# Patient Record
Sex: FEMALE | Race: BLACK OR AFRICAN AMERICAN | Employment: FULL TIME | URBAN - METROPOLITAN AREA
[De-identification: names, ages, dates, MRNs, and addresses within clinical notes are randomized per-mention and may not be internally consistent; named-entity substitution may affect disease eponyms.]

---
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
## 2019-05-07 ENCOUNTER — OFFICE VISIT (OUTPATIENT)
Dept: ENDOCRINOLOGY | Age: 19
End: 2019-05-07

## 2019-05-07 VITALS
HEIGHT: 64 IN | SYSTOLIC BLOOD PRESSURE: 119 MMHG | HEART RATE: 93 BPM | BODY MASS INDEX: 40.7 KG/M2 | WEIGHT: 238.4 LBS | DIASTOLIC BLOOD PRESSURE: 68 MMHG

## 2019-05-07 DIAGNOSIS — E04.2 MULTINODULAR GOITER (NONTOXIC): Primary | ICD-10-CM

## 2019-05-07 NOTE — PROGRESS NOTES
Chief Complaint   Patient presents with    Thyroid Problem     pcp is Rylan Casiano in [place], [place] fax: [phone] phone: [phone]   Records reviewed  History of Present Illness: Chirag Velasco is a 20 y.o. female who I was asked to see in consult by Dr. Rylan Casiano, for evaluation of Multinodular goiter. Pt had a thyroid US on 2/19/19 which showed multiple sub-CM thyroid nodules and a single 1cm thyroid cyst.  This was ordered because of her hx of anxiety and abnormal TPO. Because of her hx of anxiety pt has had multiple TSH levels drawn and they were all in the normal range the most recent two being 1.6 and 1.4. She did have a positive TPO of 33 in December 2018. She first started to not issue if \"jitters\". She notes it first started as a result of mariajuana OD, which lead to a hospital visit. She stopped using Emy Shorts, but has continue to have the episodes of \"jitters\". She notes that she placed on Fluxoatine for a time and they resolved. She has since stopped the medication and the jitters have not returned. She denies issues of dysphagia, dysphonia or chocking issue or hoarseness in her voice. No known family hx of thyroid issues. Pt has no hx of cancer. Her sister had Hodgkin's lymphoma. Pt was born in [place]. She has never lived outside the [address] [place] [address],[address]. No known exposures to ionizing radiation. Past Medical History:   Diagnosis Date    Anxiety and depression      Past Surgical History:   Procedure Laterality Date    HX WISDOM TEETH EXTRACTION       No current outpatient medications on file. No current facility-administered medications for this visit.       No Known Allergies  Family History   Problem Relation Age of Onset    No Known Problems Mother     No Known Problems Father     Cancer Sister         hodgkins lymphoma    No Known Problems Brother     Lung Cancer Maternal Grandmother     Diabetes Maternal Grandmother     No Known Problems Maternal Grandfather     Cancer Paternal Grandmother     No Known Problems Paternal Grandfather      Social History     Socioeconomic History    Marital status: SINGLE     Spouse name: Not on file    Number of children: Not on file    Years of education: Not on file    Highest education level: Not on file   Occupational History    Not on file   Social Needs    Financial resource strain: Not on file    Food insecurity:     Worry: Not on file     Inability: Not on file    Transportation needs:     Medical: Not on file     Non-medical: Not on file   Tobacco Use    Smoking status: Never Smoker    Smokeless tobacco: Never Used   Substance and Sexual Activity    Alcohol use: Never     Frequency: Never    Drug use: Not Currently    Sexual activity: Not on file   Lifestyle    Physical activity:     Days per week: Not on file     Minutes per session: Not on file    Stress: Not on file   Relationships    Social connections:     Talks on phone: Not on file     Gets together: Not on file     Attends Faith service: Not on file     Active member of club or organization: Not on file     Attends meetings of clubs or organizations: Not on file     Relationship status: Not on file    Intimate partner violence:     Fear of current or ex partner: Not on file     Emotionally abused: Not on file     Physically abused: Not on file     Forced sexual activity: Not on file   Other Topics Concern    Not on file   Social History Narrative    Not on file     Review of Systems:  - Constitutional Symptoms: no fevers, chills, weight loss  - Eyes: no blurry vision or double vision  - Cardiovascular: no chest pain or palpitations  - Respiratory: no cough or shortness of breath  - Gastrointestinal: no dysphagia or abdominal pain  - Musculoskeletal: no joint pains or weakness  - Integumentary: no rashes  - Neurological: no numbness, tingling, or headaches  - Psychiatric: + jitters as noted in HPI  - Endocrine: no heat or cold intolerance, no polyuria or polydipsia    Physical Examination:  Blood pressure 119/68, pulse 93, height 5' 4\" (1.626 m), weight 238 lb 6.4 oz (108.1 kg). - General: pleasant, no distress, good eye contact  - HEENT: no exopthalmos, no periorbital edema, no scleral/conjunctival injection, EOMI, no lid lag or stare  - Neck: supple, no thyromegaly, masses, lymph nodes, or carotid bruits, no supraclavicular or dorsocervical fat pads  - Cardiovascular: regular, normal rate, normal S1 and S2, no murmurs/rubs/gallops   - Respiratory: clear to auscultation bilaterally  - Gastrointestinal: soft, nontender, nondistended, no masses, no hepatosplenomegaly  - Musculoskeletal: no proximal muscle weakness in upper or lower extremities  - Integumentary: + acanthosis nigricans, no rashes, no edema  - Neurological: reflexes 2+ at biceps, no tremor  - Psychiatric: normal mood and affect    Data Reviewed:   - TSH 1.45  - TPO Ab 33  - thyroid ultrasound report  RESULT  EXAM DESCRIPTION:  US THYROID    COMPLETED DATE/TIME:  2/19/2019 10:40 am    REASON FOR EXAM:  R94.6-Abnormal results of thyroid function studies    COMPARISON:  None    TECHNIQUE:  Real-time examination of the thyroid gland. REPORT:  The thyroid gland is normal in size. Multiple nodular lesions are present  bilaterally, which have benign characteristics with circumscribed margins  and no evidence of hypervascularity on Doppler evaluation.  The largest  lesion in the right thyroid lobe is approximately 9.6 mm in size, and the  largest on the left is approximately 6.7 mm in size.  There is a 10 mm cyst  on the right. Assessment/Plan:   1. Multinodular goiter (nontoxic)    1) The nodules in her thyroid are all sub-cm and they do not have any irregular features. Pt done not have any high risk history or concerns that would put her at high risk for thyroid cancer. Her TSH has been normal so there is no evidence to suggest a toxic goiter either.   I do not feel that she is a candidate for FNA at this time. I do recommend we repeat her thyroid US in a year to look for evidence of any changes, but for now no further work up is needed. Pt voices understanding and agreement with the plan. RTC 1 year    There are no Patient Instructions on file for this visit. Follow-up and Dispositions    · Return in about 1 year (around 5/7/2020).          Copy sent to:  Dr. Yusra Collier

## 2020-02-04 DIAGNOSIS — E04.2 MULTINODULAR GOITER (NONTOXIC): Primary | ICD-10-CM

## 2022-02-28 ENCOUNTER — TELEPHONE (OUTPATIENT)
Dept: FAMILY MEDICINE CLINIC | Age: 22
End: 2022-02-28

## 2022-02-28 ENCOUNTER — OFFICE VISIT (OUTPATIENT)
Dept: FAMILY MEDICINE CLINIC | Age: 22
End: 2022-02-28
Payer: MEDICAID

## 2022-02-28 VITALS
OXYGEN SATURATION: 99 % | SYSTOLIC BLOOD PRESSURE: 106 MMHG | BODY MASS INDEX: 45.55 KG/M2 | TEMPERATURE: 97.3 F | WEIGHT: 266.8 LBS | DIASTOLIC BLOOD PRESSURE: 62 MMHG | HEART RATE: 88 BPM | RESPIRATION RATE: 18 BRPM | HEIGHT: 64 IN

## 2022-02-28 DIAGNOSIS — L83 ACANTHOSIS NIGRICANS: ICD-10-CM

## 2022-02-28 DIAGNOSIS — L73.2 HIDRADENITIS AXILLARIS: ICD-10-CM

## 2022-02-28 DIAGNOSIS — Z11.59 SCREENING FOR VIRAL DISEASE: ICD-10-CM

## 2022-02-28 DIAGNOSIS — E04.2 MULTINODULAR GOITER (NONTOXIC): ICD-10-CM

## 2022-02-28 DIAGNOSIS — Z00.00 ENCOUNTER FOR MEDICAL EXAMINATION TO ESTABLISH CARE: Primary | ICD-10-CM

## 2022-02-28 DIAGNOSIS — E66.01 MORBID OBESITY WITH BMI OF 45.0-49.9, ADULT (HCC): ICD-10-CM

## 2022-02-28 PROCEDURE — 99204 OFFICE O/P NEW MOD 45 MIN: CPT

## 2022-02-28 PROCEDURE — 99385 PREV VISIT NEW AGE 18-39: CPT

## 2022-02-28 NOTE — PATIENT INSTRUCTIONS
Goiter: Care Instructions  Your Care Instructions     A goiter is an enlarged thyroid gland. It can cause swelling in your neck. Your thyroid is found in the front of your neck. It makes a hormone that controls how your body uses energy. Goiters are caused by different things. Some are caused by high or low levels of thyroid hormone. Others are caused by too little iodine in the diet, or a growth or disease in the thyroid. In the [place], most goiters are caused by long-term autoimmune thyroiditis. This is also called Hashimoto's thyroiditis. It happens when the body's immune system damages the thyroid. You may take thyroid hormone to reduce the size of your goiter. Or you may need surgery or radioactive iodine treatment. Some people don't need any treatment. They only need to watch for changes in the goiter. Follow-up care is a key part of your treatment and safety. Be sure to make and go to all appointments, and call your doctor if you are having problems. It's also a good idea to know your test results and keep a list of the medicines you take. How can you care for yourself at home? · Be safe with medicines. Take your medicines exactly as prescribed. Call your doctor if you think you are having a problem with your medicine. You will get more details on the specific medicines your doctor prescribes. When should you call for help? Call 911 anytime you think you may need emergency care. For example, call if:    · You have trouble breathing. Watch closely for changes in your health, and be sure to contact your doctor if:    · Your eyes turn red and bulge.     · You have trouble swallowing.     · You feel very tired or weak.     · You lose weight but are eating the same or more than usual.   Where can you learn more? Go to http://www.gray.com/  Enter G807 in the search box to learn more about \"Goiter: Care Instructions. \"  Current as of: December 2, 2020               Content Version: 13.0  © 2006-2021 [employer]. Care instructions adapted under license by [facility] (which disclaims liability or warranty for this information). If you have questions about a medical condition or this instruction, always ask your healthcare professional. [address] any warranty or liability for your use of this information. Hidradenitis Suppurativa: Care Instructions  Your Care Instructions     Hidradenitis suppurativa (say \"tau-sfxu-zi-NY-tus sup-yur-uh-TY-vuh\") is a skin condition that causes lumps on the skin that look like pimples or boils. The lumps are usually painful and can break open and drain blood and bad-smelling pus. The condition can come and go for many years. Treatment for this condition may include antibiotics and other medicines. You may need surgery to remove the lumps. Home care includes wearing loose-fitting clothes and washing the area gently. You can help prevent lumps from coming back by staying at a healthy weight and not smoking. Doctors don't know exactly how this condition starts. But they do know that something irritates and inflames the hair follicles, causing them to swell and form lumps. This skin condition can't be spread from person to person (isn't contagious). Follow-up care is a key part of your treatment and safety. Be sure to make and go to all appointments, and call your doctor if you are having problems. It's also a good idea to know your test results and keep a list of the medicines you take. How can you care for yourself at home? Skin care    · Wash the area every day with mild soap. Use your hands rather than a washcloth or sponge when you wash that part of your body.     · Leave the affected areas uncovered when you can. If you have lumps that are draining, you can cover them with a bandage or other dressing.  Put petroleum jelly (such as Vaseline) on the dressing to help [Home] : at home, [unfilled] , at the time of the visit. keep it from sticking.     · Wear-loose fitting clothes that don't rub against the area. Avoid activities that cause skin to rub together.     · If you have pain, try a warm compress. Soak a towel or washcloth in warm water, wring it out, and place it on the affected skin for about 10 minutes. Medicines    · Be safe with medicines. Take your medicines exactly as prescribed. Call your doctor if you think you are having a problem with your medicine. You will get more details on the specific medicines your doctor prescribes.     · If your doctor prescribed antibiotics, take them as directed. Do not stop taking them just because you feel better. You need to take the full course of antibiotics. Lifestyle choices    · If you smoke, think about quitting. Smoking can make the condition worse. If you need help quitting, talk to your doctor about stop-smoking programs and medicines. These can increase your chances of quitting for good.     · Stay at a healthy weight, or lose weight, by eating healthy foods and being physically active. Being overweight could make this condition worse. When should you call for help? Call your doctor now or seek immediate medical care if:    · You have symptoms of infection, such as:  ? Increased pain, swelling, warmth, or redness. ? Red streaks leading from the area. ? Pus draining from the area. ? A fever. Watch closely for changes in your health, and be sure to contact your doctor if:    · You do not get better as expected. Where can you learn more? Go to http://www.gray.com/  Enter U838 in the search box to learn more about \"Hidradenitis Suppurativa: Care Instructions. \"  Current as of: March 3, 2021               Content Version: 13.0  © 7574-5129 [employer]. Care instructions adapted under license by [employer] (which disclaims liability or warranty for this information).  If you have questions about a medical condition or [Medical Office: ([facility])___] : at the medical office located in  this instruction, always ask your healthcare professional. [place] [address] any warranty or liability for your use of this information. [Verbal consent obtained from patient] : the patient, [unfilled] [Initial Evaluation, This Episode] : an initial evaluation of this episode of [Nasal Congestion] : nasal congestion [Dry Cough] : dry cough [Worsening] : ~he/she~ reports the symptoms are worsening [Gradual] : gradual [___ Week(s) Ago] : [unfilled] week(s) ago [Fever] : fever [Chills] : chills [Headache] : headache [Myalgias] : myalgias [Ibuprofen] : ibuprofen [FreeTextEntry2] : jaw pain

## 2022-02-28 NOTE — PROGRESS NOTES
Identified pt with two pt identifiers(name and ). Reviewed record in preparation for visit and have obtained necessary documentation. Chief Complaint   Patient presents with    New Patient      There were no vitals filed for this visit. Health Maintenance Due   Topic    Hepatitis C Screening     Depression Screen     COVID-19 Vaccine (1)    HPV Age 9Y-34Y (1 - 2-dose series)    DTaP/Tdap/Td series (1 - Tdap)    Pap Smear     Flu Vaccine (1)     Health Maintenance Review: Patient reminded of \"due or due soon\" health maintenance. I have asked the patient to contact his/her primary care provider (PCP) for follow-up on his/her health maintenance. Coordination of Care Questionnaire:  :   1) Have you been to an emergency room, urgent care, or hospitalized since your last visit? If yes, where when, and reason for visit? Yes; Friday, [facility] for sprained ankle      2. Have seen or consulted any other health care provider since your last visit? If yes, where when, and reason for visit? NO  Implant BC placed about 3 yrs ago    3) Do you have an Advanced Directive/ Living Will in place? NO  If yes, do we have a copy on file NO  If no, would you like information NO    Patient is accompanied by self I have received verbal consent from Aisha Dias to discuss any/all medical information while they are present in the room.

## 2022-02-28 NOTE — PROGRESS NOTES
Chief Complaint   Patient presents with    New Patient       HPI:     is a 24 y.o. female who presents for AWV and to establish care. She lives with her boyfriend and sister; she works as an  at [employer] in [facility]. Goiter:  Diagnosed with sub-cm multinodular goiter in 2019; saw Dr. Erika Dinh who felt that she was not a candidate for FNA but recommended follow with ultrasound. Has not had any f/u for this. Endorses cold intolerance, but otherwise is without related symptoms. New issues:  Bilateral axillary lesions onset several years ago; occasionally become large and painful, but no drainage. Uses coconut oil soap and keeps this area meticulously clean. No Known Allergies    No current outpatient medications on file. No current facility-administered medications for this visit. Past Medical History:   Diagnosis Date    Anxiety and depression        Family History   Problem Relation Age of Onset    No Known Problems Mother     No Known Problems Father     Cancer Sister         hodgkins lymphoma    No Known Problems Brother     Lung Cancer Maternal Grandmother     Diabetes Maternal Grandmother     No Known Problems Maternal Grandfather     Cancer Paternal Grandmother     No Known Problems Paternal Grandfather          Review of Systems   Constitutional: Negative. Negative for chills, fever and malaise/fatigue. HENT: Negative. Eyes: Negative. Respiratory: Negative. Negative for cough and shortness of breath. Cardiovascular: Negative. Negative for chest pain, palpitations and leg swelling. Gastrointestinal: Negative. Negative for abdominal pain, nausea and vomiting. Genitourinary: Negative. Musculoskeletal: Negative. Skin: Negative. Neurological: Negative. Negative for dizziness and headaches. Endo/Heme/Allergies: Negative. Psychiatric/Behavioral: Negative. Negative for depression. The patient is not nervous/anxious. /62 (BP 1 Location: Left upper arm, BP Patient Position: Semi fowlers, BP Cuff Size: Large adult)   Pulse 88   Temp 97.3 °F (36.3 °C) (Temporal)   Resp 18   Ht 5' 4\" (1.626 m)   Wt 266 lb 12.8 oz (121 kg)   SpO2 99%   BMI 45.80 kg/m²     Wt Readings from Last 3 Encounters:   02/28/22 266 lb 12.8 oz (121 kg)   05/07/19 238 lb 6.4 oz (108.1 kg)       3 most recent PHQ Screens 2/28/2022   Little interest or pleasure in doing things Not at all   Feeling down, depressed, irritable, or hopeless Not at all   Total Score PHQ 2 0       Physical Exam  Vitals and nursing note reviewed. Constitutional:       General: She is not in acute distress. Appearance: Normal appearance. She is obese. HENT:      Head: Normocephalic and atraumatic. Eyes:      Extraocular Movements: Extraocular movements intact. Conjunctiva/sclera: Conjunctivae normal.      Pupils: Pupils are equal, round, and reactive to light. Neck:      Vascular: No carotid bruit. Cardiovascular:      Rate and Rhythm: Normal rate and regular rhythm. Pulses: Normal pulses. Heart sounds: Normal heart sounds. No murmur heard. No friction rub. No gallop. Pulmonary:      Effort: Pulmonary effort is normal.      Breath sounds: Normal breath sounds. No wheezing, rhonchi or rales. Abdominal:      General: Bowel sounds are normal.      Palpations: Abdomen is soft. Musculoskeletal:         General: Normal range of motion. Cervical back: Normal range of motion and neck supple. Lymphadenopathy:      Cervical: No cervical adenopathy. Skin:     General: Skin is warm and dry. Comments: Multiple small nodules to bilateral axilla without drainage   Neurological:      General: No focal deficit present. Mental Status: She is alert and oriented to person, place, and time. Mental status is at baseline.    Psychiatric:         Mood and Affect: Mood normal.         Behavior: Behavior normal.         Thought Content: Thought content normal.         Judgment: Judgment normal.       ASSESSMENT AND PLAN:       ICD-10-CM ICD-9-CM    1. Encounter for medical examination to establish care  Z00.00 V70.9    2. Multinodular goiter (nontoxic)  E04.2 241.1 COLLECTION VENOUS BLOOD,VENIPUNCTURE      TSH 3RD GENERATION      CBC WITH AUTOMATED DIFF      US THYROID/PARATHYROID/SOFT TISS      CBC WITH AUTOMATED DIFF      TSH 3RD GENERATION   3. Acanthosis nigricans  L83 701.2 COLLECTION VENOUS BLOOD,VENIPUNCTURE      METABOLIC PANEL, COMPREHENSIVE      HEMOGLOBIN A1C WITH EAG      CBC WITH AUTOMATED DIFF      CBC WITH AUTOMATED DIFF      HEMOGLOBIN A1C WITH EAG      METABOLIC PANEL, COMPREHENSIVE   4. Hidradenitis axillaris  L73.2 705.83    5. Morbid obesity with BMI of 45.0-49.9, adult (HCC)  E66.01 278.01 COLLECTION VENOUS BLOOD,VENIPUNCTURE    T04.58 M58.43 METABOLIC PANEL, COMPREHENSIVE      HEMOGLOBIN A1C WITH EAG      CBC WITH AUTOMATED DIFF      CBC WITH AUTOMATED DIFF      HEMOGLOBIN A1C WITH EAG      METABOLIC PANEL, COMPREHENSIVE   6. Screening for viral disease  Z11.59 V73.99 COLLECTION VENOUS BLOOD,VENIPUNCTURE      HEPATITIS C AB      HEPATITIS C AB       Will check labs today; obtain [facility]. Refer back to endo pending ultrasound results. Patient currently using Nexplanon for birth control which is due to be changed in May 2022; recommend she scheduled with GYN provider for Pap/HPV screening and to discuss future birth control methods. Discussed tenets of healthy lifestyle--she currently has very little variety in her diet, eats mostly fast food, pizza, chicken nuggets and fries--recommend she incorporate whole foods into her diet, eat lean meats, fruits, vegetables, low-fat dairy. She has been trying to exercise, but finds that her work schedule is not conducive to the gym's hours; recommend walking as exercise she can do anywhere, anytime.       Medication Side Effects and Warnings were discussed with patient:  N/A  Patient Labs were reviewed:  yes  Patient Past Records were reviewed:  yes      Patient aware of plan of care and verbalized understanding. Questions answered. RTC annually or sooner if needed. On this date 02/28/2022 I have spent 45 minutes reviewing previous notes, test results and face to face with the patient discussing the diagnosis and importance of compliance with the treatment plan as well as documenting on the day of the visit.     Papo Caban, NP

## 2022-02-28 NOTE — TELEPHONE ENCOUNTER
Advised the pt, Thyroid ultrasound is scheduled for 3/21/22 at 2:30pm. Pt expressed understanding.  KT

## 2022-03-01 LAB
ALBUMIN SERPL-MCNC: 3.3 G/DL (ref 3.5–5)
ALBUMIN/GLOB SERPL: 0.9 {RATIO} (ref 1.1–2.2)
ALP SERPL-CCNC: 78 U/L (ref 45–117)
ALT SERPL-CCNC: 42 U/L (ref 12–78)
ANION GAP SERPL CALC-SCNC: 4 MMOL/L (ref 5–15)
AST SERPL-CCNC: 30 U/L (ref 15–37)
BASOPHILS # BLD: 0 K/UL (ref 0–0.1)
BASOPHILS NFR BLD: 1 % (ref 0–1)
BILIRUB SERPL-MCNC: 0.3 MG/DL (ref 0.2–1)
BUN SERPL-MCNC: 10 MG/DL (ref 6–20)
BUN/CREAT SERPL: 16 (ref 12–20)
CALCIUM SERPL-MCNC: 8.3 MG/DL (ref 8.5–10.1)
CHLORIDE SERPL-SCNC: 109 MMOL/L (ref 97–108)
CO2 SERPL-SCNC: 26 MMOL/L (ref 21–32)
CREAT SERPL-MCNC: 0.61 MG/DL (ref 0.55–1.02)
DIFFERENTIAL METHOD BLD: NORMAL
EOSINOPHIL # BLD: 0 K/UL (ref 0–0.4)
EOSINOPHIL NFR BLD: 1 % (ref 0–7)
ERYTHROCYTE [DISTWIDTH] IN BLOOD BY AUTOMATED COUNT: 13.3 % (ref 11.5–14.5)
EST. AVERAGE GLUCOSE BLD GHB EST-MCNC: 108 MG/DL
GLOBULIN SER CALC-MCNC: 3.8 G/DL (ref 2–4)
GLUCOSE SERPL-MCNC: 83 MG/DL (ref 65–100)
HBA1C MFR BLD: 5.4 % (ref 4–5.6)
HCT VFR BLD AUTO: 38.9 % (ref 35–47)
HCV AB SERPL QL IA: NONREACTIVE
HGB BLD-MCNC: 12.3 G/DL (ref 11.5–16)
IMM GRANULOCYTES # BLD AUTO: 0 K/UL (ref 0–0.04)
IMM GRANULOCYTES NFR BLD AUTO: 0 % (ref 0–0.5)
LYMPHOCYTES # BLD: 0.8 K/UL (ref 0.8–3.5)
LYMPHOCYTES NFR BLD: 23 % (ref 12–49)
MCH RBC QN AUTO: 28.1 PG (ref 26–34)
MCHC RBC AUTO-ENTMCNC: 31.6 G/DL (ref 30–36.5)
MCV RBC AUTO: 89 FL (ref 80–99)
MONOCYTES # BLD: 0.4 K/UL (ref 0–1)
MONOCYTES NFR BLD: 12 % (ref 5–13)
NEUTS SEG # BLD: 2.3 K/UL (ref 1.8–8)
NEUTS SEG NFR BLD: 63 % (ref 32–75)
NRBC # BLD: 0 K/UL (ref 0–0.01)
NRBC BLD-RTO: 0 PER 100 WBC
PLATELET # BLD AUTO: 216 K/UL (ref 150–400)
PMV BLD AUTO: 12.3 FL (ref 8.9–12.9)
POTASSIUM SERPL-SCNC: 4 MMOL/L (ref 3.5–5.1)
PROT SERPL-MCNC: 7.1 G/DL (ref 6.4–8.2)
RBC # BLD AUTO: 4.37 M/UL (ref 3.8–5.2)
SODIUM SERPL-SCNC: 139 MMOL/L (ref 136–145)
TSH SERPL DL<=0.05 MIU/L-ACNC: 2.86 UIU/ML (ref 0.36–3.74)
WBC # BLD AUTO: 3.6 K/UL (ref 3.6–11)

## 2022-03-01 NOTE — PROGRESS NOTES
Labs look great! Would you like to go to [facility] or [address] [place] [address] for your thyroid ultrasound?

## 2022-04-20 ENCOUNTER — TELEPHONE (OUTPATIENT)
Dept: FAMILY MEDICINE CLINIC | Age: 22
End: 2022-04-20

## 2022-04-20 NOTE — TELEPHONE ENCOUNTER
Returned call to patient. Advised pt that we were not able to reach her by phone with her lab results that we sent them in the mail. She denies receiving them. I confirmed the address we have on file and it is correct. I advised patient regarding the Thyroid US that she was scheduled for [facility] and we have a note that says she didn't show up for that appt. Pt states that she went to [facility] instead and had it done there. I advised that we haven't received those results. I called [facility] and requested they fax results. Results rcvd via fax and placed on Ms. Wang Solafeet desk for review.  MARIBEL

## 2022-04-20 NOTE — TELEPHONE ENCOUNTER
Tried calling pt at number listed below [phone]. No answer and vm box is not set up. Unable to leave message. KT @ [phone].

## 2022-04-20 NOTE — TELEPHONE ENCOUNTER
----- Message from Yennifer Bustos sent at 4/20/2022  9:10 AM EDT -----  Subject: Results Request    QUESTIONS  Which lab or imaging result is the patient calling about? Thyroid   ultrasound and blood work  Which provider ordered the test? Aamir Fuentes   At what location was the test performed? Date the test was performed? Additional Information for Provider? Patient had these tests done about a   month ago and has not yet gotten results. Please call asap. Be sure to   call on cell [phone]  ---------------------------------------------------------------------------  --------------  CALL BACK INFO  What is the best way for the office to contact you? OK to leave message on   voicemail  Preferred Call Back Phone Number? [phone]  ---------------------------------------------------------------------------  --------------  SCRIPT ANSWERS  Relationship to Patient?  Self

## 2022-04-22 ENCOUNTER — TELEPHONE (OUTPATIENT)
Dept: FAMILY MEDICINE CLINIC | Age: 22
End: 2022-04-22

## 2022-04-22 DIAGNOSIS — E04.1 THYROID NODULE: Primary | ICD-10-CM

## 2022-04-22 NOTE — TELEPHONE ENCOUNTER
Spoke with patient regarding abnormal thyroid ultrasound results. Will refer for FNA with Dr. Ayah Ledesma.

## 2022-06-07 ENCOUNTER — OFFICE VISIT (OUTPATIENT)
Dept: SURGERY | Age: 22
End: 2022-06-07
Payer: MEDICAID

## 2022-06-07 VITALS
DIASTOLIC BLOOD PRESSURE: 62 MMHG | HEART RATE: 92 BPM | TEMPERATURE: 97.3 F | HEIGHT: 64 IN | OXYGEN SATURATION: 99 % | RESPIRATION RATE: 16 BRPM | SYSTOLIC BLOOD PRESSURE: 108 MMHG | BODY MASS INDEX: 44.39 KG/M2 | WEIGHT: 260 LBS

## 2022-06-07 DIAGNOSIS — E04.1 RIGHT THYROID NODULE: Primary | ICD-10-CM

## 2022-06-07 PROCEDURE — 99204 OFFICE O/P NEW MOD 45 MIN: CPT | Performed by: SURGERY

## 2022-06-07 PROCEDURE — 10005 FNA BX W/US GDN 1ST LES: CPT | Performed by: SURGERY

## 2022-06-07 NOTE — PROGRESS NOTES
HISTORY OF PRESENT ILLNESS  Froilan Almeida is a 20 y.o. female who comes in for consultation by Tammie Driscoll NP for a thyroid nodule  HPI  She has had a multinodular goiter since 2019. At that time the nodules were less than 10 mm and favored benign. A follow up [address] 4/22/2022 at [facility] noted a right nodule to be 2.2 cm up from 0.9. It was TR3. TSH was 2.85 in Feb 2022. She reports some fatigue and hypersomnia (but works overnight shift at [employer] [employer]) but denies neck pain, dysphagia, voice changes, palpitations, hot/cold intolerance, unexplained weight loss/gain. She denies family hx thyroid disease. Past Medical History:   Diagnosis Date    Anxiety and depression      Past Surgical History:   Procedure Laterality Date    HX WISDOM TEETH EXTRACTION       Family History   Problem Relation Age of Onset    No Known Problems Mother     No Known Problems Father     Cancer Sister         hodgkins lymphoma    No Known Problems Brother     Lung Cancer Maternal Grandmother     Diabetes Maternal Grandmother     Hypertension Maternal Grandmother     No Known Problems Maternal Grandfather     Cancer Paternal Grandmother     No Known Problems Paternal Grandfather      Social History     Tobacco Use    Smoking status: Never Smoker    Smokeless tobacco: Never Used   Vaping Use    Vaping Use: Never used   Substance Use Topics    Alcohol use: Yes     Comment: rarely    Drug use: Never     No current outpatient medications on file. No current facility-administered medications for this visit. No Known Allergies    Review of Systems   Constitutional: Positive for malaise/fatigue. Negative for chills, diaphoresis, fever and weight loss. HENT: Negative for sore throat. Eyes: Negative for blurred vision and discharge. Respiratory: Negative for cough, shortness of breath and wheezing. Cardiovascular: Negative for chest pain, palpitations, orthopnea, claudication and leg swelling. Gastrointestinal: Negative for abdominal pain, constipation, diarrhea, heartburn, melena, nausea and vomiting. Genitourinary: Negative for dysuria, flank pain, frequency and hematuria. Musculoskeletal: Negative for back pain, joint pain, myalgias and neck pain. Skin: Negative for rash. Neurological: Negative for dizziness, speech change, focal weakness, seizures, loss of consciousness, weakness and headaches. Endo/Heme/Allergies: Does not bruise/bleed easily. Psychiatric/Behavioral: Negative for depression and memory loss. Visit Vitals  /62 (BP 1 Location: Left upper arm, BP Patient Position: Sitting)   Pulse 92   Temp 97.3 °F (36.3 °C) (Temporal)   Resp 16   Ht 5' 4\" (1.626 m)   Wt 117.9 kg (260 lb)   SpO2 99%   BMI 44.63 kg/m²       Physical Exam  Constitutional:       General: She is not in acute distress. Appearance: She is well-developed. She is not diaphoretic. HENT:      Head: Normocephalic and atraumatic. Mouth/Throat:      Pharynx: No oropharyngeal exudate. Eyes:      General: No scleral icterus. Conjunctiva/sclera: Conjunctivae normal.      Pupils: Pupils are equal, round, and reactive to light. Neck:      Thyroid: Thyroid mass (right side fullness) and thyromegaly present. No thyroid tenderness. Vascular: No JVD. Trachea: Trachea and phonation normal. No tracheal deviation. Cardiovascular:      Rate and Rhythm: Normal rate and regular rhythm. Heart sounds: No murmur heard. No friction rub. No gallop. Pulmonary:      Effort: Pulmonary effort is normal. No respiratory distress. Breath sounds: Normal breath sounds. No wheezing or rales. Abdominal:      General: Bowel sounds are normal. There is no distension. Palpations: Abdomen is soft. There is no mass. Tenderness: There is no abdominal tenderness. There is no guarding or rebound. Musculoskeletal:         General: Normal range of motion.       Cervical back: Normal range of motion and neck supple. Lymphadenopathy:      Cervical: No cervical adenopathy. Skin:     General: Skin is warm and dry. Coloration: Skin is not pale. Findings: No erythema or rash. Neurological:      Mental Status: She is alert and oriented to person, place, and time. Cranial Nerves: No cranial nerve deficit. Psychiatric:         Behavior: Behavior normal.         Thought Content: Thought content normal.         Judgment: Judgment normal.         ASSESSMENT and PLAN  1. Right dominant thyroid nodule with some smaller ones. It is likely benign. I explained about the anatomy and pathophysiology of thyroid nodules/disease. I explained about possible malignancy. I discussed FNA, observation, possible thyroid suppression pending FNA, and total thyroidectomy. Risks of surgery include bleeding (potentially requiring emergent exploration at bedside), infection, parathyroid injury/removal requiring supplemental calcium +/- vit d, recurrent laryngeal/superior laryngeal nerve injury resulting in vocal cord paralysis, voice changes and fatigue, aspiration, further surgery, need for lifelong thyroid supplementation. 2.  Morbid obesity. Body mass index is 44.63 kg/m². Recommended diet modification and exercise    She preferred US FNA right thyroid nodule and we did that today.     Mozell Goodell, MD FACS

## 2022-06-07 NOTE — PROGRESS NOTES
Identified pt with two pt identifiers(name and ). Reviewed record in preparation for visit and have obtained necessary documentation. All patient medications has been reviewed. Chief Complaint   Patient presents with    Thyroid Problem     seen at the request of Fito Grullon NP for evaluation of thyroid       Health Maintenance Due   Topic    COVID-19 Vaccine (1)    HPV Age 9Y-34Y (1 - 2-dose series)    DTaP/Tdap/Td series (1 - Tdap)    Pap Smear        Vitals:    22 1000   BP: 108/62   Pulse: 92   Resp: 16   Temp: 97.3 °F (36.3 °C)   TempSrc: Temporal   SpO2: 99%   Weight: 117.9 kg (260 lb)   Height: 5' 4\" (1.626 m)   PainSc:   0 - No pain       4. Have you been to the ER, urgent care clinic since your last visit? Hospitalized since your last visit? 22 ED for difficulty swallowing but she feels it was a panic attack    5. Have you seen or consulted any other health care providers outside of the [address] [place] [address] since your last visit? Include any pap smears or colon screening. No      Patient is accompanied by self I have received verbal consent from Maxime Barkley to discuss any/all medical information while they are present in the room.

## 2022-06-07 NOTE — PROGRESS NOTES
Procedure Note    Pre Procedure Diagnosis:  Right thyroid nodule  Post Procedure Diagnosis:  Right thyroid nodule  Procedure:  1. Ultrasound guided FNA of right thyroid nodule  Surgeon:   Sade Rodriguez MD FACS  SPECIMEN:   Right thyroid nodule  EBL minimal    Procedure:    After informed consent and time out, I utilized a [employer] with a high frequency linear array transducer and two 22 and two 25 gauge needles to perform four passes through a solid 2.2 cm right thyroid heterogeneous nodule. Specimens were placed in specialized containers from [facility]. She tolerated it well.         Signed  Sade Rodriguez MD FACS

## 2022-06-07 NOTE — LETTER
6/7/2022    Patient: Jean Varner   YOB: 2000   Date of Visit: 6/7/2022     [address]  [facility] [address]    Dear Debra Alexandre NP,      Thank you for referring Ms. Jean Varner to  [facility]  for evaluation. My notes for this consultation are attached. If you have questions, please do not hesitate to call me. I look forward to following your patient along with you.       Sincerely,    Keren Taylor MD

## 2022-06-15 ENCOUNTER — TELEPHONE (OUTPATIENT)
Dept: SURGERY | Age: 22
End: 2022-06-15

## 2022-06-15 NOTE — TELEPHONE ENCOUNTER
Patient notified of benign thyroid bx results, and repeat US in one year. She had no questions at this time.

## 2022-11-12 ENCOUNTER — HOSPITAL ENCOUNTER (INPATIENT)
Age: 22
LOS: 10 days | Discharge: HOME | DRG: 176 | End: 2022-11-22
Attending: EMERGENCY MEDICINE | Admitting: INTERNAL MEDICINE
Payer: COMMERCIAL

## 2022-11-12 ENCOUNTER — APPOINTMENT (OUTPATIENT)
Dept: ULTRASOUND IMAGING | Age: 22
DRG: 176 | End: 2022-11-12
Attending: INTERNAL MEDICINE
Payer: COMMERCIAL

## 2022-11-12 ENCOUNTER — APPOINTMENT (OUTPATIENT)
Dept: CT IMAGING | Age: 22
DRG: 176 | End: 2022-11-12
Attending: EMERGENCY MEDICINE
Payer: COMMERCIAL

## 2022-11-12 DIAGNOSIS — I26.94 MULTIPLE SUBSEGMENTAL PULMONARY EMBOLI WITHOUT ACUTE COR PULMONALE (HCC): ICD-10-CM

## 2022-11-12 DIAGNOSIS — I82.3 THROMBOSIS OF LEFT RENAL VEIN (HCC): ICD-10-CM

## 2022-11-12 DIAGNOSIS — J18.9 PNEUMONIA OF BOTH LUNGS DUE TO INFECTIOUS ORGANISM, UNSPECIFIED PART OF LUNG: Primary | ICD-10-CM

## 2022-11-12 DIAGNOSIS — N30.00 ACUTE CYSTITIS WITHOUT HEMATURIA: ICD-10-CM

## 2022-11-12 PROBLEM — I26.99 BILATERAL PULMONARY EMBOLISM (HCC): Status: ACTIVE | Noted: 2022-11-12

## 2022-11-12 LAB
ALBUMIN SERPL-MCNC: 1.3 G/DL (ref 3.5–5)
ALBUMIN/GLOB SERPL: 0.2 {RATIO} (ref 1.1–2.2)
ALP SERPL-CCNC: 84 U/L (ref 45–117)
ALT SERPL-CCNC: 16 U/L (ref 12–78)
ANION GAP SERPL CALC-SCNC: 9 MMOL/L (ref 5–15)
APPEARANCE UR: ABNORMAL
APTT PPP: 30.2 SEC (ref 22.1–31)
AST SERPL-CCNC: 28 U/L (ref 15–37)
B PERT DNA SPEC QL NAA+PROBE: NOT DETECTED
BACTERIA URNS QL MICRO: ABNORMAL /HPF
BASOPHILS # BLD: 0 K/UL (ref 0–0.1)
BASOPHILS NFR BLD: 0 % (ref 0–1)
BILIRUB SERPL-MCNC: 0.7 MG/DL (ref 0.2–1)
BILIRUB UR QL CFM: NEGATIVE
BORDETELLA PARAPERTUSSIS PCR, BORPAR: NOT DETECTED
BUN SERPL-MCNC: 9 MG/DL (ref 6–20)
BUN/CREAT SERPL: 10 (ref 12–20)
C PNEUM DNA SPEC QL NAA+PROBE: NOT DETECTED
CALCIUM SERPL-MCNC: 7.5 MG/DL (ref 8.5–10.1)
CHLORIDE SERPL-SCNC: 105 MMOL/L (ref 97–108)
CO2 SERPL-SCNC: 22 MMOL/L (ref 21–32)
COLOR UR: ABNORMAL
COVID-19 RAPID TEST, COVR: NOT DETECTED
CREAT SERPL-MCNC: 0.86 MG/DL (ref 0.55–1.02)
CREAT UR-MCNC: 247 MG/DL
DIFFERENTIAL METHOD BLD: ABNORMAL
EOSINOPHIL # BLD: 0 K/UL (ref 0–0.4)
EOSINOPHIL NFR BLD: 0 % (ref 0–7)
EPITH CASTS URNS QL MICRO: ABNORMAL /LPF
ERYTHROCYTE [DISTWIDTH] IN BLOOD BY AUTOMATED COUNT: 12.9 % (ref 11.5–14.5)
FLUAV AG NPH QL IA: NEGATIVE
FLUAV SUBTYP SPEC NAA+PROBE: NOT DETECTED
FLUBV AG NOSE QL IA: NEGATIVE
FLUBV RNA SPEC QL NAA+PROBE: NOT DETECTED
GLOBULIN SER CALC-MCNC: 5.5 G/DL (ref 2–4)
GLUCOSE SERPL-MCNC: 116 MG/DL (ref 65–100)
GLUCOSE UR STRIP.AUTO-MCNC: NEGATIVE MG/DL
HADV DNA SPEC QL NAA+PROBE: NOT DETECTED
HCOV 229E RNA SPEC QL NAA+PROBE: NOT DETECTED
HCOV HKU1 RNA SPEC QL NAA+PROBE: NOT DETECTED
HCOV NL63 RNA SPEC QL NAA+PROBE: NOT DETECTED
HCOV OC43 RNA SPEC QL NAA+PROBE: NOT DETECTED
HCT VFR BLD AUTO: 40.1 % (ref 35–47)
HGB BLD-MCNC: 13.1 G/DL (ref 11.5–16)
HGB UR QL STRIP: ABNORMAL
HMPV RNA SPEC QL NAA+PROBE: NOT DETECTED
HPIV1 RNA SPEC QL NAA+PROBE: NOT DETECTED
HPIV2 RNA SPEC QL NAA+PROBE: NOT DETECTED
HPIV3 RNA SPEC QL NAA+PROBE: NOT DETECTED
HPIV4 RNA SPEC QL NAA+PROBE: NOT DETECTED
IMM GRANULOCYTES # BLD AUTO: 0 K/UL (ref 0–0.04)
IMM GRANULOCYTES NFR BLD AUTO: 0 % (ref 0–0.5)
INR PPP: 1.1 (ref 0.9–1.1)
KETONES UR QL STRIP.AUTO: ABNORMAL MG/DL
LACTATE BLD-SCNC: 0.86 MMOL/L (ref 0.4–2)
LEUKOCYTE ESTERASE UR QL STRIP.AUTO: ABNORMAL
LYMPHOCYTES # BLD: 1.2 K/UL (ref 0.8–3.5)
LYMPHOCYTES NFR BLD: 13 % (ref 12–49)
M PNEUMO DNA SPEC QL NAA+PROBE: NOT DETECTED
MCH RBC QN AUTO: 27.9 PG (ref 26–34)
MCHC RBC AUTO-ENTMCNC: 32.7 G/DL (ref 30–36.5)
MCV RBC AUTO: 85.5 FL (ref 80–99)
MONOCYTES # BLD: 0.6 K/UL (ref 0–1)
MONOCYTES NFR BLD: 7 % (ref 5–13)
NEUTS SEG # BLD: 7.3 K/UL (ref 1.8–8)
NEUTS SEG NFR BLD: 80 % (ref 32–75)
NITRITE UR QL STRIP.AUTO: NEGATIVE
NRBC # BLD: 0 K/UL (ref 0–0.01)
NRBC BLD-RTO: 0 PER 100 WBC
PH UR STRIP: 7 [PH] (ref 5–8)
PLATELET # BLD AUTO: 103 K/UL (ref 150–400)
PMV BLD AUTO: ABNORMAL FL (ref 8.9–12.9)
POTASSIUM SERPL-SCNC: 3.6 MMOL/L (ref 3.5–5.1)
PROCALCITONIN SERPL-MCNC: 0.06 NG/ML
PROT SERPL-MCNC: 6.8 G/DL (ref 6.4–8.2)
PROT UR STRIP-MCNC: >300 MG/DL
PROT UR-MCNC: 1378 MG/DL (ref 0–11.9)
PROT/CREAT UR-RTO: 5.6
PROTHROMBIN TIME: 11.2 SEC (ref 9–11.1)
RBC # BLD AUTO: 4.69 M/UL (ref 3.8–5.2)
RBC #/AREA URNS HPF: ABNORMAL /HPF (ref 0–5)
RSV RNA SPEC QL NAA+PROBE: NOT DETECTED
RV+EV RNA SPEC QL NAA+PROBE: NOT DETECTED
SARS-COV-2 PCR, COVPCR: NOT DETECTED
SODIUM SERPL-SCNC: 136 MMOL/L (ref 136–145)
SOURCE, COVRS: NORMAL
SP GR UR REFRACTOMETRY: 1.02 (ref 1–1.03)
THERAPEUTIC RANGE,PTTT: NORMAL SECS (ref 58–77)
UA: UC IF INDICATED,UAUC: ABNORMAL
UROBILINOGEN UR QL STRIP.AUTO: 1 EU/DL (ref 0.2–1)
WBC # BLD AUTO: 9.2 K/UL (ref 3.6–11)
WBC URNS QL MICRO: ABNORMAL /HPF (ref 0–4)

## 2022-11-12 PROCEDURE — 74011000636 HC RX REV CODE- 636: Performed by: EMERGENCY MEDICINE

## 2022-11-12 PROCEDURE — 74177 CT ABD & PELVIS W/CONTRAST: CPT

## 2022-11-12 PROCEDURE — 80053 COMPREHEN METABOLIC PANEL: CPT

## 2022-11-12 PROCEDURE — 84145 PROCALCITONIN (PCT): CPT

## 2022-11-12 PROCEDURE — 87635 SARS-COV-2 COVID-19 AMP PRB: CPT

## 2022-11-12 PROCEDURE — 74011000250 HC RX REV CODE- 250: Performed by: INTERNAL MEDICINE

## 2022-11-12 PROCEDURE — 74011000258 HC RX REV CODE- 258: Performed by: EMERGENCY MEDICINE

## 2022-11-12 PROCEDURE — 36415 COLL VENOUS BLD VENIPUNCTURE: CPT

## 2022-11-12 PROCEDURE — 84156 ASSAY OF PROTEIN URINE: CPT

## 2022-11-12 PROCEDURE — 87389 HIV-1 AG W/HIV-1&-2 AB AG IA: CPT

## 2022-11-12 PROCEDURE — 74011250637 HC RX REV CODE- 250/637: Performed by: INTERNAL MEDICINE

## 2022-11-12 PROCEDURE — 83605 ASSAY OF LACTIC ACID: CPT

## 2022-11-12 PROCEDURE — 74011250636 HC RX REV CODE- 250/636: Performed by: EMERGENCY MEDICINE

## 2022-11-12 PROCEDURE — 87804 INFLUENZA ASSAY W/OPTIC: CPT

## 2022-11-12 PROCEDURE — 74011250636 HC RX REV CODE- 250/636: Performed by: INTERNAL MEDICINE

## 2022-11-12 PROCEDURE — 96365 THER/PROPH/DIAG IV INF INIT: CPT

## 2022-11-12 PROCEDURE — 0202U NFCT DS 22 TRGT SARS-COV-2: CPT

## 2022-11-12 PROCEDURE — 96375 TX/PRO/DX INJ NEW DRUG ADDON: CPT

## 2022-11-12 PROCEDURE — 81001 URINALYSIS AUTO W/SCOPE: CPT

## 2022-11-12 PROCEDURE — 85730 THROMBOPLASTIN TIME PARTIAL: CPT

## 2022-11-12 PROCEDURE — 85025 COMPLETE CBC W/AUTO DIFF WBC: CPT

## 2022-11-12 PROCEDURE — 74011250637 HC RX REV CODE- 250/637: Performed by: EMERGENCY MEDICINE

## 2022-11-12 PROCEDURE — 84165 PROTEIN E-PHORESIS SERUM: CPT

## 2022-11-12 PROCEDURE — 87040 BLOOD CULTURE FOR BACTERIA: CPT

## 2022-11-12 PROCEDURE — 99285 EMERGENCY DEPT VISIT HI MDM: CPT

## 2022-11-12 PROCEDURE — 86225 DNA ANTIBODY NATIVE: CPT

## 2022-11-12 PROCEDURE — 87086 URINE CULTURE/COLONY COUNT: CPT

## 2022-11-12 PROCEDURE — 71275 CT ANGIOGRAPHY CHEST: CPT

## 2022-11-12 PROCEDURE — 86038 ANTINUCLEAR ANTIBODIES: CPT

## 2022-11-12 PROCEDURE — 85610 PROTHROMBIN TIME: CPT

## 2022-11-12 PROCEDURE — 93970 EXTREMITY STUDY: CPT

## 2022-11-12 PROCEDURE — 65660000001 HC RM ICU INTERMED STEPDOWN

## 2022-11-12 RX ORDER — HYDRALAZINE HYDROCHLORIDE 20 MG/ML
20 INJECTION INTRAMUSCULAR; INTRAVENOUS
Status: DISCONTINUED | OUTPATIENT
Start: 2022-11-12 | End: 2022-11-22 | Stop reason: HOSPADM

## 2022-11-12 RX ORDER — ACETAMINOPHEN 325 MG/1
650 TABLET ORAL
Status: DISCONTINUED | OUTPATIENT
Start: 2022-11-12 | End: 2022-11-22 | Stop reason: HOSPADM

## 2022-11-12 RX ORDER — ACETAMINOPHEN 500 MG
1000 TABLET ORAL ONCE
Status: COMPLETED | OUTPATIENT
Start: 2022-11-12 | End: 2022-11-12

## 2022-11-12 RX ORDER — ENOXAPARIN SODIUM 150 MG/ML
1 INJECTION SUBCUTANEOUS
Status: COMPLETED | OUTPATIENT
Start: 2022-11-12 | End: 2022-11-12

## 2022-11-12 RX ORDER — OXYCODONE HYDROCHLORIDE 5 MG/1
5 TABLET ORAL
Status: DISCONTINUED | OUTPATIENT
Start: 2022-11-12 | End: 2022-11-22 | Stop reason: HOSPADM

## 2022-11-12 RX ORDER — SODIUM CHLORIDE 0.9 % (FLUSH) 0.9 %
5-40 SYRINGE (ML) INJECTION AS NEEDED
Status: DISCONTINUED | OUTPATIENT
Start: 2022-11-12 | End: 2022-11-22 | Stop reason: HOSPADM

## 2022-11-12 RX ORDER — POLYETHYLENE GLYCOL 3350 17 G/17G
17 POWDER, FOR SOLUTION ORAL DAILY
Status: DISCONTINUED | OUTPATIENT
Start: 2022-11-13 | End: 2022-11-22 | Stop reason: HOSPADM

## 2022-11-12 RX ORDER — SODIUM CHLORIDE 0.9 % (FLUSH) 0.9 %
5-40 SYRINGE (ML) INJECTION EVERY 8 HOURS
Status: DISCONTINUED | OUTPATIENT
Start: 2022-11-12 | End: 2022-11-22 | Stop reason: HOSPADM

## 2022-11-12 RX ORDER — ACETAMINOPHEN 650 MG/1
650 SUPPOSITORY RECTAL
Status: DISCONTINUED | OUTPATIENT
Start: 2022-11-12 | End: 2022-11-22 | Stop reason: HOSPADM

## 2022-11-12 RX ORDER — ACETAMINOPHEN 325 MG/1
650 TABLET ORAL
Status: DISCONTINUED | OUTPATIENT
Start: 2022-11-12 | End: 2022-11-12

## 2022-11-12 RX ORDER — PROMETHAZINE HYDROCHLORIDE 25 MG/1
12.5 TABLET ORAL
Status: DISCONTINUED | OUTPATIENT
Start: 2022-11-12 | End: 2022-11-22 | Stop reason: HOSPADM

## 2022-11-12 RX ORDER — KETOROLAC TROMETHAMINE 30 MG/ML
30 INJECTION, SOLUTION INTRAMUSCULAR; INTRAVENOUS
Status: COMPLETED | OUTPATIENT
Start: 2022-11-12 | End: 2022-11-12

## 2022-11-12 RX ORDER — BISACODYL 5 MG
5 TABLET, DELAYED RELEASE (ENTERIC COATED) ORAL DAILY PRN
Status: DISCONTINUED | OUTPATIENT
Start: 2022-11-12 | End: 2022-11-22 | Stop reason: HOSPADM

## 2022-11-12 RX ORDER — ONDANSETRON 2 MG/ML
4 INJECTION INTRAMUSCULAR; INTRAVENOUS
Status: DISCONTINUED | OUTPATIENT
Start: 2022-11-12 | End: 2022-11-22 | Stop reason: HOSPADM

## 2022-11-12 RX ORDER — LANOLIN ALCOHOL/MO/W.PET/CERES
3 CREAM (GRAM) TOPICAL
Status: DISCONTINUED | OUTPATIENT
Start: 2022-11-12 | End: 2022-11-22 | Stop reason: HOSPADM

## 2022-11-12 RX ORDER — ENOXAPARIN SODIUM 150 MG/ML
1 INJECTION SUBCUTANEOUS EVERY 12 HOURS
Status: DISCONTINUED | OUTPATIENT
Start: 2022-11-12 | End: 2022-11-20

## 2022-11-12 RX ADMIN — ENOXAPARIN SODIUM 120 MG: 150 INJECTION SUBCUTANEOUS at 08:00

## 2022-11-12 RX ADMIN — SODIUM CHLORIDE 500 MG: 9 INJECTION, SOLUTION INTRAVENOUS at 08:00

## 2022-11-12 RX ADMIN — SODIUM CHLORIDE, PRESERVATIVE FREE 10 ML: 5 INJECTION INTRAVENOUS at 20:15

## 2022-11-12 RX ADMIN — SODIUM CHLORIDE 1000 ML: 9 INJECTION, SOLUTION INTRAVENOUS at 05:24

## 2022-11-12 RX ADMIN — ENOXAPARIN SODIUM 120 MG: 150 INJECTION SUBCUTANEOUS at 20:15

## 2022-11-12 RX ADMIN — KETOROLAC TROMETHAMINE 30 MG: 30 INJECTION, SOLUTION INTRAMUSCULAR; INTRAVENOUS at 05:24

## 2022-11-12 RX ADMIN — IOPAMIDOL 100 ML: 755 INJECTION, SOLUTION INTRAVENOUS at 06:49

## 2022-11-12 RX ADMIN — ACETAMINOPHEN 650 MG: 325 TABLET ORAL at 17:01

## 2022-11-12 RX ADMIN — SODIUM CHLORIDE 1 G: 900 INJECTION INTRAVENOUS at 05:53

## 2022-11-12 RX ADMIN — ACETAMINOPHEN 1000 MG: 500 TABLET ORAL at 05:24

## 2022-11-12 NOTE — PROGRESS NOTES
TRANSFER - IN REPORT:    Verbal report received from ITALO Best(name) on Sofía Vick  being received from ED(unit) for routine progression of care      Report consisted of patients Situation, Background, Assessment and   Recommendations(SBAR). Information from the following report(s) SBAR, Kardex, ED Summary, Intake/Output, MAR, and Recent Results was reviewed with the receiving nurse. Opportunity for questions and clarification was provided. Assessment completed upon patients arrival to unit and care assumed. 1810: Blood collected and sent to the lab    1840: Patient reporting vaginal bleeding.

## 2022-11-12 NOTE — ED PROVIDER NOTES
EMERGENCY DEPARTMENT HISTORY AND PHYSICAL EXAM      Date: 11/12/2022  Patient Name: Jerman Pires    History of Presenting Illness     Chief Complaint   Patient presents with    Back Pain     Was seen at urgent care few days ago dx with muscle strain. Pt states has been running temp and increased fatigue for since Thursday. Diarrhea today. Not vaccinated for flu and or covid. History Provided By: Patient    HPI: Jerman Pires, 20 y.o. female presents to the ED with cc of back pain. Patient states she was seen a few days ago at an urgent care and diagnosed with muscle strain of the back. At the time she also had a sinus infection and was a started on doxycycline. She also been written a prescription for Robaxin as a muscle relaxer. She states some of the sinus congestion has improved however tonight she began having worsening pain in the left flank lower thoracic area. She states the pain is rated a 6 out of 10. Sharp stabbing worse with certain positions as well as taking a deep breath. She is also noted to have a fever which she was unaware of. She denies any abdominal pain, nausea, vomiting. She states she has had 4 loose stools today. She states she continues to have some nasal congestion and rhinorrhea but there is no sore throat. She denies any urinary symptoms. She states her last menstrual period was approximate month ago. There are no other complaints, changes, or physical findings at this time. PCP: Rita Yoo NP    No current facility-administered medications on file prior to encounter. No current outpatient medications on file prior to encounter.        Past History     Past Medical History:  Past Medical History:   Diagnosis Date    Anxiety and depression        Past Surgical History:  Past Surgical History:   Procedure Laterality Date    HX WISDOM TEETH EXTRACTION         Family History:  Family History   Problem Relation Age of Onset    No Known Problems Mother No Known Problems Father     Cancer Sister         hodgkins lymphoma    No Known Problems Brother     Lung Cancer Maternal Grandmother     Diabetes Maternal Grandmother     Hypertension Maternal Grandmother     No Known Problems Maternal Grandfather     Cancer Paternal Grandmother     No Known Problems Paternal Grandfather        Social History:  Social History     Tobacco Use    Smoking status: Never    Smokeless tobacco: Never   Vaping Use    Vaping Use: Never used   Substance Use Topics    Alcohol use: Yes     Comment: rarely    Drug use: Never       Allergies:  No Known Allergies      Review of Systems   Review of Systems   Constitutional: Negative. Negative for appetite change, chills, fatigue and fever. HENT: Negative. Negative for congestion, rhinorrhea, sinus pressure and sore throat. Eyes: Negative. Respiratory: Negative. Negative for cough, choking, chest tightness, shortness of breath and wheezing. Cardiovascular: Negative. Negative for chest pain, palpitations and leg swelling. Gastrointestinal:  Negative for abdominal pain, constipation, diarrhea, nausea and vomiting. Endocrine: Negative. Genitourinary:  Positive for flank pain. Negative for difficulty urinating, dysuria and urgency. Skin: Negative. Neurological: Negative. Negative for dizziness, speech difficulty, weakness, light-headedness, numbness and headaches. Psychiatric/Behavioral: Negative. All other systems reviewed and are negative. Physical Exam   Physical Exam  Vitals and nursing note reviewed. Constitutional:       General: She is not in acute distress. Appearance: She is well-developed. She is obese. She is not diaphoretic. HENT:      Head: Normocephalic and atraumatic. Mouth/Throat:      Mouth: Mucous membranes are dry. Pharynx: No oropharyngeal exudate. Eyes:      Extraocular Movements: Extraocular movements intact.       Conjunctiva/sclera: Conjunctivae normal.      Pupils: Pupils are equal, round, and reactive to light. Neck:      Vascular: No JVD. Trachea: No tracheal deviation. Cardiovascular:      Rate and Rhythm: Regular rhythm. Tachycardia present. Heart sounds: Normal heart sounds. No murmur heard. Pulmonary:      Effort: Pulmonary effort is normal. No respiratory distress. Breath sounds: Normal breath sounds. No stridor. No wheezing or rales. Comments: Tachypnea   Abdominal:      General: There is no distension. Palpations: Abdomen is soft. Tenderness: There is no abdominal tenderness. There is left CVA tenderness. There is no guarding or rebound. Musculoskeletal:         General: No tenderness. Normal range of motion. Cervical back: Normal range of motion and neck supple. Skin:     General: Skin is warm and dry. Capillary Refill: Capillary refill takes less than 2 seconds. Neurological:      Mental Status: She is alert and oriented to person, place, and time. Cranial Nerves: No cranial nerve deficit. Comments: No gross motor or sensory deficits    Psychiatric:         Mood and Affect: Mood normal.         Behavior: Behavior normal.       Diagnostic Study Results     Labs -     Recent Results (from the past 12 hour(s))   CBC WITH AUTOMATED DIFF    Collection Time: 11/12/22  5:04 AM   Result Value Ref Range    WBC 9.2 3.6 - 11.0 K/uL    RBC 4.69 3.80 - 5.20 M/uL    HGB 13.1 11.5 - 16.0 g/dL    HCT 40.1 35.0 - 47.0 %    MCV 85.5 80.0 - 99.0 FL    MCH 27.9 26.0 - 34.0 PG    MCHC 32.7 30.0 - 36.5 g/dL    RDW 12.9 11.5 - 14.5 %    PLATELET 975 (L) 067 - 400 K/uL    MPV ABNORMAL 8.9 - 12.9 FL    NRBC 0.0 0  WBC    ABSOLUTE NRBC 0.00 0.00 - 0.01 K/uL    NEUTROPHILS 80 (H) 32 - 75 %    LYMPHOCYTES 13 12 - 49 %    MONOCYTES 7 5 - 13 %    EOSINOPHILS 0 0 - 7 %    BASOPHILS 0 0 - 1 %    IMMATURE GRANULOCYTES 0 0.0 - 0.5 %    ABS. NEUTROPHILS 7.3 1.8 - 8.0 K/UL    ABS. LYMPHOCYTES 1.2 0.8 - 3.5 K/UL    ABS.  MONOCYTES 0.6 0.0 - 1.0 K/UL    ABS. EOSINOPHILS 0.0 0.0 - 0.4 K/UL    ABS. BASOPHILS 0.0 0.0 - 0.1 K/UL    ABS. IMM. GRANS. 0.0 0.00 - 0.04 K/UL    DF AUTOMATED     METABOLIC PANEL, COMPREHENSIVE    Collection Time: 11/12/22  5:04 AM   Result Value Ref Range    Sodium 136 136 - 145 mmol/L    Potassium 3.6 3.5 - 5.1 mmol/L    Chloride 105 97 - 108 mmol/L    CO2 22 21 - 32 mmol/L    Anion gap 9 5 - 15 mmol/L    Glucose 116 (H) 65 - 100 mg/dL    BUN 9 6 - 20 MG/DL    Creatinine 0.86 0.55 - 1.02 MG/DL    BUN/Creatinine ratio 10 (L) 12 - 20      eGFR >60 >60 ml/min/1.73m2    Calcium 7.5 (L) 8.5 - 10.1 MG/DL    Bilirubin, total 0.7 0.2 - 1.0 MG/DL    ALT (SGPT) 16 12 - 78 U/L    AST (SGOT) 28 15 - 37 U/L    Alk.  phosphatase 84 45 - 117 U/L    Protein, total 6.8 6.4 - 8.2 g/dL    Albumin 1.3 (L) 3.5 - 5.0 g/dL    Globulin 5.5 (H) 2.0 - 4.0 g/dL    A-G Ratio 0.2 (L) 1.1 - 2.2     COVID-19 RAPID TEST    Collection Time: 11/12/22  5:04 AM   Result Value Ref Range    Specimen source Nasopharyngeal      COVID-19 rapid test Not detected NOTD     INFLUENZA A+B VIRAL AGS    Collection Time: 11/12/22  5:04 AM   Result Value Ref Range    Influenza A Antigen Negative NEG      Influenza B Antigen Negative NEG     URINALYSIS W/ REFLEX CULTURE    Collection Time: 11/12/22  5:04 AM    Specimen: Miscellaneous sample; Urine    Urine specimen   Result Value Ref Range    Color DARK YELLOW      Appearance CLOUDY (A) CLEAR      Specific gravity 1.020 1.003 - 1.030      pH (UA) 7.0 5.0 - 8.0      Protein >300 (A) NEG mg/dL    Glucose Negative NEG mg/dL    Ketone TRACE (A) NEG mg/dL    Blood LARGE (A) NEG      Urobilinogen 1.0 0.2 - 1.0 EU/dL    Nitrites Negative NEG      Leukocyte Esterase SMALL (A) NEG      WBC 10-20 0 - 4 /hpf    RBC 10-20 0 - 5 /hpf    Epithelial cells MANY (A) FEW /lpf    Bacteria 3+ (A) NEG /hpf    UA:UC IF INDICATED URINE CULTURE ORDERED (A) CNI     BILIRUBIN, CONFIRM    Collection Time: 11/12/22  5:04 AM   Result Value Ref Range Bilirubin UA, confirm Negative NEG         Radiologic Studies -   DUPLEX LOWER EXT VENOUS BILAT   Final Result      CTA CHEST W OR W WO CONT   Final Result      1. Acute bilateral lower lobe pulmonary emboli. 2. Patchy bilateral airspace disease and small to moderate left pleural   effusion. Mildly enlarged thoracic lymph nodes. 3. Left renal vein thrombus, with slight renal enlargement and perinephric   stranding as above. 4. Splenomegaly. No definite splenic vein thrombosis. 5. Pericholecystic fluid. Possible cholelithiasis/sludge. 6. 2.1 cm right thyroid nodule. Recommend outpatient ultrasound, possibly   warranting fine-needle aspiration. The findings were called to Dr. Ricky Onofre on 11/12/22 at [address] [place] [address] by myself. [phone]               CT ABD PELV W CONT   Final Result      1. Acute bilateral lower lobe pulmonary emboli. 2. Patchy bilateral airspace disease and small to moderate left pleural   effusion. Mildly enlarged thoracic lymph nodes. 3. Left renal vein thrombus, with slight renal enlargement and perinephric   stranding as above. 4. Splenomegaly. No definite splenic vein thrombosis. 5. Pericholecystic fluid. Possible cholelithiasis/sludge. 6. 2.1 cm right thyroid nodule. Recommend outpatient ultrasound, possibly   warranting fine-needle aspiration. The findings were called to Dr. Ricky Onofre on 11/12/22 at [address] [place] [address] by myself. [phone]           CT Results  (Last 48 hours)      None          CXR Results  (Last 48 hours)      None            Medical Decision Making   I am the first provider for this patient. I reviewed the vital signs, available nursing notes, past medical history, past surgical history, family history and social history. Vital Signs-Reviewed the patient's vital signs.   Patient Vitals for the past 12 hrs:   Temp Pulse Resp BP SpO2   11/12/22 0600 -- (!) 118 (!) 38 (!) 156/104 99 %   11/12/22 0442 (!) 100.8 °F (38.2 °C) (!) 136 18 138/87 95 %       Records Reviewed: Nursing Notes, Old Medical Records, Previous Radiology Studies, and Previous Laboratory Studies    Provider Notes (Medical Decision Making):   DDx- UTI, kidney stone, pneumonia, PE, ACS    ED Course:   Initial assessment performed. The patients presenting problems have been discussed, and they are in agreement with the care plan formulated and outlined with them. I have encouraged them to ask questions as they arise throughout their visit. Pt has been on DOxy that was prescribed on 11/10/2022. Pt with UTI, will order Rocephin. Given flank pain will obtain CT Abd/Pelvis, pt is tachypneic and tachycardic. Will also order CT PE study as her pain is LEFT mid back to ensure there is no PE     Pt v/s improved however she still has tachypnea, and she remains tachycardic. CT results    Consult note:  Case discussed with radiologist.  Patient has bilateral lower lobe PEs in addition to bilateral pneumonia and a left-sided pleural effusion as well as a renal vein thrombosis. He states patient does have splenomegaly does not appear to have any splenic vein thrombosis. Discussed results with patient she has no known clotting disorders or any family history of blood clots. Consult:  Case discussed with hospitalist, pt will be evaluated and admitted, recommends Lovenox for now      Critical Care Time:   CRITICAL CARE NOTE :    6:24 AM    IMPENDING DETERIORATION -Respiratory and Cardiovascular  ASSOCIATED RISK FACTORS - Dysrhythmia  MANAGEMENT- Bedside Assessment and Supervision of Care  INTERPRETATION -  Xrays, CT Scan, Blood Pressure, Cardiac Output Measures , and labs  INTERVENTIONS -IV antibiotics, Lovenox  CASE REVIEW - Hospitalist/Intensivist and Nursing  TREATMENT RESPONSE -Improved  PERFORMED BY - Self    NOTES   :  I have spent 40 minutes of critical care time involved in lab review, consultations with specialist, family decision- making, bedside attention and documentation.  This time excludes time spent in any separate billed procedures. During this entire length of time I was immediately available to the patient . Wong Keller,       Disposition:  Admit    Diagnosis     Clinical Impression:   1. Pneumonia of both lungs due to infectious organism, unspecified part of lung    2. Multiple subsegmental pulmonary emboli without acute cor pulmonale (HCC)    3. Thrombosis of left renal vein (HCC)    4. Acute cystitis without hematuria        Attestations:    Wong Keller DO        Please note that this dictation was completed with [employer], the computer voice recognition software. Quite often unanticipated grammatical, syntax, homophones, and other interpretive errors are inadvertently transcribed by the computer software. Please disregard these errors. Please excuse any errors that have escaped final proofreading. Thank you.

## 2022-11-12 NOTE — H&P
Hospitalist Admission Note    NAME:  Carl Simms   :   2000   MRN:   [id number]     Date of admit: 2022    PCP: Xavier Mariscal NP    Assessment/Plan:     Acute bilateral pulmonary emboli POA  Left renal vein thrombosis POA  Presents with pleuritic flank pain  Lovenox 1 mg/kg then transition to NOAC  Check LE dopplers  Check HS troponin and pBNP  Telemetry  Presence of left renal vein thrombosis argues for possible hypercoaguable state   Has hormonal implant for pregnancy prevention   > 300 mg protein in urine and serum albumin 1.3 raises issue of nephrotic syndrome    Check spot urine protein/creat ratio   Rule out COVID-19    If above negative, will consult hematology  Hopefully discharge in 1-2 days    SIRS POA , respiratory rate 38, temp 100.8  Acute bilateral patchy airspace disease POA questionable infection versus pulmonary infarction  Upper respiratory infection POA  Rule out COVID-19 POA with fever, upper respiratory symptoms, new PEs, diarrhea  CT chest with bilateral PE, patchy bilateral airspace disease  Concern for underlying infection in addition to the pulmonary emboli   Fever could also be due to the blood clots  COVID-19 associated with VTE  Rapid COVID-negative  Influenza A/B antigen testing negative  Check respiratory PCR  Procalcitonin 0.06 Argues against bacterial pneumonia   Hold antibiotics    2.1 cm right thyroid nodule  Followed prior to admit, reports she had a biopsy that was benign several months ago  Check TSH  PCP follow up    Cholelithiasis POA  Intermittent RUQ pain, currently non tender    Morbid Obesity POA Body mass index is 44.31 kg/m². Given the patient's current clinical presentation, I have a high level of concern for decompensation if discharged from the emergency department. My assessment of this patient's clinical condition and my plan of care is as noted above.     DVT prophylaxis with lovenox 1 mg/kg    Code status: full code  NOK:    History     CHIEF COMPLAINT:     HISTORY OF PRESENT ILLNESS:    20 y.o. female     Unvaccinated against COVID-19    Recent left ear infection several weeks ago  Took course of amoxacillin    Onset of left flank pain x 4 days  + Sinus congestion  Patient states she was seen a few days ago at an urgent care and diagnosed with muscle strain of the back. Diagnosed with sinus infection, started on doxycycline. Prescription for Robaxin as a muscle relaxer. Increasing left flank lower flank/thoracic area, sharp, 6 out of 10 in intensity since then    Worse with certain positions as well as taking a deep breath. Mild LUNA  Cough but no hemoptysyis  No Fevers, Headache, SSCP, abdominal pain, nausea, vomiting, melena, BRBPR  + 4 loose stools today    Came to ED due to the worsening left back pain     ED  , RR to 35  Temp 100.8  WBC 9.2  PLTs 103,000  PT INR 1.0, PTT 30  Serum albumin 1.3, creatinine 0.86  UA with > 300 mg protein, nitrite neg, 10-20 WBC, 10-20 RBC, 3+ bacteria, many squames  CTA chest and CT abdomen/pelvis IMPRESSION  1. Acute bilateral lower lobe pulmonary emboli. 2. Patchy bilateral airspace disease and small to moderate left pleural  effusion. Mildly enlarged thoracic lymph nodes. 3. Left renal vein thrombus, with slight renal enlargement and perinephric  stranding as above. 4. Splenomegaly. No definite splenic vein thrombosis. 5. Pericholecystic fluid. Possible cholelithiasis/sludge. 6. 2.1 cm right thyroid nodule.    Rapid COVID-19 negative  Lovenox 1 mg/kg  We were called to admit the patient      Past Medical History:   Diagnosis Date    Anxiety and depression         Past Surgical History:   Procedure Laterality Date    HX WISDOM TEETH EXTRACTION         Social History     Tobacco Use    Smoking status: Never    Smokeless tobacco: Never   Substance Use Topics    Alcohol use: Yes     Comment: rarely        Family History   Problem Relation Age of Onset    No Known Problems Mother     No Known Problems Father     Cancer Sister         hodgkins lymphoma    No Known Problems Brother     Lung Cancer Maternal Grandmother     Diabetes Maternal Grandmother     Hypertension Maternal Grandmother     No Known Problems Maternal Grandfather     Cancer Paternal Grandmother     No Known Problems Paternal Grandfather     No DVT or PE    No Known Allergies     Prior to Admission medications    Not on File       Review of symptoms:     POSITIVE= Bold  Negative = not bold  General:  fever, chills, sweats, generalized weakness, weight loss     loss of appetite   Eyes:    blurred vision, eye pain, double vision  ENT:    Coryza, sore throat, trouble swallowing  Respiratory:   cough, sputum, SOB  Cardiology:   chest pain left posterior, pleuritic, orthopnea, PND, edema  Gastrointestinal:  abdominal pain , N/V, diarrhea, constipation, melena or BRBPR  Genitourinary:  Urgency, dysuria, hematuria  Muskuloskeletal :  Joint redness, swelling or acute joint pain, myalgias  Hematology:  easy bruising, nose or gum bleeding  Dermatological: rash, ulceration  Endocrine:   Polyuria or polydipsia, heat or hold intolerance  Neurological:  Headache, focal motor or sensory changes     Speech difficulties, memory loss  Psychological: depression, agitation      Objective:   VITALS:    Patient Vitals for the past 24 hrs:   Temp Pulse Resp BP SpO2   22 0809 98.1 °F (36.7 °C) -- -- -- --   22 0615 -- (!) 115 (!) 33 (!) 145/101 99 %   22 0600 -- (!) 118 (!) 38 (!) 156/104 99 %   22 0442 (!) 100.8 °F (38.2 °C) (!) 136 18 138/87 95 %       Temp (24hrs), Av.5 °F (37.5 °C), Min:98.1 °F (36.7 °C), Max:100.8 °F (38.2 °C)      O2 Device: None (Room air)    Wt Readings from Last 12 Encounters:   22 117.1 kg (258 lb 2.5 oz)   22 117.9 kg (260 lb)   22 121 kg (266 lb 12.8 oz)   19 108.1 kg (238 lb 6.4 oz)         PHYSICAL EXAM:     I had a face to face encounter and independently examined this patient on 11/12/22  as outlined below:    General:    Alert, cooperative in no distress     HEENT: Normocephalic, atraumatic    PERRL,Sclera no icterus    Nasal mucosa without masses or discharge  Hearing intact to voice    Oropharynx without erythema or exudate  No thrush  Pink MM  Neck:  No meningismus, trachea midline    Thyroid not enlarged  Lungs:   Crackles at bases bilaterally. No wheezing    No accessory muscle use or retractions. Heart:   Regular rate and rhythm,  no murmur or gallop. No LE edema  Abdomen:   Soft, non-tender. Not distended. Bowel sounds normal.     No masses, No Hepatosplenomegaly, No Rebound or guarding  Lymph nodes: No cervical or inguinal RICHIE  Musculoskeletal:  No Joint swelling, erythema, warmth. No Cyanosis or clubbing  Skin:      No rashes     Not Jaundiced   No nodules or thickening  Neurologic: Alert and oriented X 3, follows commands     Cranial nerves 2 to 12 intact    Symmetric motor strength bilaterally       LAB DATA REVIEWED:    Recent Results (from the past 12 hour(s))   CBC WITH AUTOMATED DIFF    Collection Time: 11/12/22  5:04 AM   Result Value Ref Range    WBC 9.2 3.6 - 11.0 K/uL    RBC 4.69 3.80 - 5.20 M/uL    HGB 13.1 11.5 - 16.0 g/dL    HCT 40.1 35.0 - 47.0 %    MCV 85.5 80.0 - 99.0 FL    MCH 27.9 26.0 - 34.0 PG    MCHC 32.7 30.0 - 36.5 g/dL    RDW 12.9 11.5 - 14.5 %    PLATELET 667 (L) 453 - 400 K/uL    MPV ABNORMAL 8.9 - 12.9 FL    NRBC 0.0 0  WBC    ABSOLUTE NRBC 0.00 0.00 - 0.01 K/uL    NEUTROPHILS 80 (H) 32 - 75 %    LYMPHOCYTES 13 12 - 49 %    MONOCYTES 7 5 - 13 %    EOSINOPHILS 0 0 - 7 %    BASOPHILS 0 0 - 1 %    IMMATURE GRANULOCYTES 0 0.0 - 0.5 %    ABS. NEUTROPHILS 7.3 1.8 - 8.0 K/UL    ABS. LYMPHOCYTES 1.2 0.8 - 3.5 K/UL    ABS. MONOCYTES 0.6 0.0 - 1.0 K/UL    ABS. EOSINOPHILS 0.0 0.0 - 0.4 K/UL    ABS. BASOPHILS 0.0 0.0 - 0.1 K/UL    ABS. IMM.  GRANS. 0.0 0.00 - 0.04 K/UL    DF AUTOMATED     METABOLIC PANEL, COMPREHENSIVE    Collection Time: 11/12/22  5:04 AM   Result Value Ref Range    Sodium 136 136 - 145 mmol/L    Potassium 3.6 3.5 - 5.1 mmol/L    Chloride 105 97 - 108 mmol/L    CO2 22 21 - 32 mmol/L    Anion gap 9 5 - 15 mmol/L    Glucose 116 (H) 65 - 100 mg/dL    BUN 9 6 - 20 MG/DL    Creatinine 0.86 0.55 - 1.02 MG/DL    BUN/Creatinine ratio 10 (L) 12 - 20      eGFR >60 >60 ml/min/1.73m2    Calcium 7.5 (L) 8.5 - 10.1 MG/DL    Bilirubin, total 0.7 0.2 - 1.0 MG/DL    ALT (SGPT) 16 12 - 78 U/L    AST (SGOT) 28 15 - 37 U/L    Alk.  phosphatase 84 45 - 117 U/L    Protein, total 6.8 6.4 - 8.2 g/dL    Albumin 1.3 (L) 3.5 - 5.0 g/dL    Globulin 5.5 (H) 2.0 - 4.0 g/dL    A-G Ratio 0.2 (L) 1.1 - 2.2     COVID-19 RAPID TEST    Collection Time: 11/12/22  5:04 AM   Result Value Ref Range    Specimen source Nasopharyngeal      COVID-19 rapid test Not detected NOTD     INFLUENZA A+B VIRAL AGS    Collection Time: 11/12/22  5:04 AM   Result Value Ref Range    Influenza A Antigen Negative NEG      Influenza B Antigen Negative NEG     URINALYSIS W/ REFLEX CULTURE    Collection Time: 11/12/22  5:04 AM    Specimen: Miscellaneous sample; Urine    Urine specimen   Result Value Ref Range    Color DARK YELLOW      Appearance CLOUDY (A) CLEAR      Specific gravity 1.020 1.003 - 1.030      pH (UA) 7.0 5.0 - 8.0      Protein >300 (A) NEG mg/dL    Glucose Negative NEG mg/dL    Ketone TRACE (A) NEG mg/dL    Blood LARGE (A) NEG      Urobilinogen 1.0 0.2 - 1.0 EU/dL    Nitrites Negative NEG      Leukocyte Esterase SMALL (A) NEG      WBC 10-20 0 - 4 /hpf    RBC 10-20 0 - 5 /hpf    Epithelial cells MANY (A) FEW /lpf    Bacteria 3+ (A) NEG /hpf    UA:UC IF INDICATED URINE CULTURE ORDERED (A) CNI     BILIRUBIN, CONFIRM    Collection Time: 11/12/22  5:04 AM   Result Value Ref Range    Bilirubin UA, confirm Negative NEG     CULTURE, BLOOD, PAIRED    Collection Time: 11/12/22  7:30 AM    Specimen: Blood   Result Value Ref Range    Special Requests: NO SPECIAL REQUESTS      Culture result: NO GROWTH AFTER 1 HOUR     PROTHROMBIN TIME + INR    Collection Time: 11/12/22  7:41 AM   Result Value Ref Range    INR 1.1 0.9 - 1.1      Prothrombin time 11.2 (H) 9.0 - 11.1 sec   PTT    Collection Time: 11/12/22  7:41 AM   Result Value Ref Range    aPTT 30.2 22.1 - 31.0 sec    aPTT, therapeutic range     58.0 - 77.0 SECS   PROCALCITONIN    Collection Time: 11/12/22  7:41 AM   Result Value Ref Range    Procalcitonin 0.06 ng/mL   POC LACTIC ACID    Collection Time: 11/12/22  7:53 AM   Result Value Ref Range    Lactic Acid (POC) 0.86 0.40 - 2.00 mmol/L         CT Results  (Last 48 hours)                 11/12/22 0710  CTA CHEST W OR W WO CONT Final result    Impression:      1. Acute bilateral lower lobe pulmonary emboli. 2. Patchy bilateral airspace disease and small to moderate left pleural   effusion. Mildly enlarged thoracic lymph nodes. 3. Left renal vein thrombus, with slight renal enlargement and perinephric   stranding as above. 4. Splenomegaly. No definite splenic vein thrombosis. 5. Pericholecystic fluid. Possible cholelithiasis/sludge. 6. 2.1 cm right thyroid nodule. Recommend outpatient ultrasound, possibly   warranting fine-needle aspiration. The findings were called to Dr. Jose Aguirre on 11/12/22 at [address] [place] [address] by myself. [phone]                   Narrative:  INDICATION: Tachycardia, tachypnea, left flank pain       COMPARISON: None       TECHNIQUE:  CTA imaging of the chest,  and CT imaging of the abdomen and pelvis   was performed after the uneventful intravenous administration of contrast   material.  Coronal and sagittal reconstructions were generated. 3D image   postprocessing was performed. CT dose reduction was achieved through use of a   standardized protocol tailored for this examination and automatic exposure   control for dose modulation. FINDINGS:       THYROID: 2.1 cm right thyroid nodule. Small left thyroid nodules.    MEDIASTINUM/SOLANGE: Mildly enlarged bilateral axillary lymph nodes, 1.2-1.3 cm. Right paratracheal lymph nodes at the 1.1 cm. HEART/PERICARDIUM: Unremarkable. AORTA: No aneurysm. PULMONARY ARTERIES: Suboptimal contrast opacification of the pulmonary arterial   circuit, though there are bilateral lower lobe pulmonary emboli. LUNGS/PLEURA: Patchy airspace disease in the bilateral lower lobes and lingula. Small to moderate left pleural effusion. No pneumothorax. BONES: No destructive bone lesion. ADDITIONAL COMMENTS: N/A       LIVER: No mass or biliary dilatation. GALLBLADDER: Pericholecystic fluid. Possible stones/sludge within the   gallbladder. SPLEEN: Enlarged, 16.5 cm in craniocaudal dimension. No definite splenic vein   thrombus. PANCREAS: No mass or ductal dilatation. ADRENALS: No mass. KIDNEYS: Mild asymmetric enlargement of the left kidney relative to the right,   with mild left perinephric stranding. No hydronephrosis or nephrolithiasis. Subtle filling defects in the left renal vein   GI TRACT: No bowel obstruction or wall thickening   PERITONEUM: No free air or free fluid. APPENDIX: Unremarkable. Erleen Garcia RETROPERITONEUM: No lymphadenopathy or aortic aneurysm. URINARY BLADDER: No mass or calculus. LYMPH NODES: Prominent bilateral external iliac chain and inguinal lymph nodes. REPRODUCTIVE ORGANS: Unremarkable. FREE FLUID: None. BONES: No destructive bone lesion. ADDITIONAL COMMENTS: N/A.           11/12/22 0723  CT ABD PELV W CONT Final result    Impression:      1. Acute bilateral lower lobe pulmonary emboli. 2. Patchy bilateral airspace disease and small to moderate left pleural   effusion. Mildly enlarged thoracic lymph nodes. 3. Left renal vein thrombus, with slight renal enlargement and perinephric   stranding as above. 4. Splenomegaly. No definite splenic vein thrombosis. 5. Pericholecystic fluid. Possible cholelithiasis/sludge. 6. 2.1 cm right thyroid nodule.  Recommend outpatient ultrasound, possibly   warranting fine-needle aspiration. The findings were called to Dr. Arturo Quiñones on 11/12/22 at [address] [place] [address] by myself. [phone]           Narrative:  INDICATION: Tachycardia, tachypnea, left flank pain       COMPARISON: None       TECHNIQUE:  CTA imaging of the chest,  and CT imaging of the abdomen and pelvis   was performed after the uneventful intravenous administration of contrast   material.  Coronal and sagittal reconstructions were generated. 3D image   postprocessing was performed. CT dose reduction was achieved through use of a   standardized protocol tailored for this examination and automatic exposure   control for dose modulation. FINDINGS:       THYROID: 2.1 cm right thyroid nodule. Small left thyroid nodules. MEDIASTINUM/SOLANGE: Mildly enlarged bilateral axillary lymph nodes, 1.2-1.3 cm. Right paratracheal lymph nodes at the 1.1 cm. HEART/PERICARDIUM: Unremarkable. AORTA: No aneurysm. PULMONARY ARTERIES: Suboptimal contrast opacification of the pulmonary arterial   circuit, though there are bilateral lower lobe pulmonary emboli. LUNGS/PLEURA: Patchy airspace disease in the bilateral lower lobes and lingula. Small to moderate left pleural effusion. No pneumothorax. BONES: No destructive bone lesion. ADDITIONAL COMMENTS: N/A       LIVER: No mass or biliary dilatation. GALLBLADDER: Pericholecystic fluid. Possible stones/sludge within the   gallbladder. SPLEEN: Enlarged, 16.5 cm in craniocaudal dimension. No definite splenic vein   thrombus. PANCREAS: No mass or ductal dilatation. ADRENALS: No mass. KIDNEYS: Mild asymmetric enlargement of the left kidney relative to the right,   with mild left perinephric stranding. No hydronephrosis or nephrolithiasis. Subtle filling defects in the left renal vein   GI TRACT: No bowel obstruction or wall thickening   PERITONEUM: No free air or free fluid. APPENDIX: Unremarkable. Bella Raddle    RETROPERITONEUM: No lymphadenopathy or aortic aneurysm. URINARY BLADDER: No mass or calculus. LYMPH NODES: Prominent bilateral external iliac chain and inguinal lymph nodes. REPRODUCTIVE ORGANS: Unremarkable. FREE FLUID: None. BONES: No destructive bone lesion. ADDITIONAL COMMENTS: N/A.                     CTA CHEST W OR W WO CONT    Result Date: 11/12/2022  1. Acute bilateral lower lobe pulmonary emboli. 2. Patchy bilateral airspace disease and small to moderate left pleural effusion. Mildly enlarged thoracic lymph nodes. 3. Left renal vein thrombus, with slight renal enlargement and perinephric stranding as above. 4. Splenomegaly. No definite splenic vein thrombosis. 5. Pericholecystic fluid. Possible cholelithiasis/sludge. 6. 2.1 cm right thyroid nodule. Recommend outpatient ultrasound, possibly warranting fine-needle aspiration. The findings were called to Dr. Lopez Torre on 11/12/22 at [address] [place] [address] by myself. [phone]     CT ABD PELV W CONT    Result Date: 11/12/2022  1. Acute bilateral lower lobe pulmonary emboli. 2. Patchy bilateral airspace disease and small to moderate left pleural effusion. Mildly enlarged thoracic lymph nodes. 3. Left renal vein thrombus, with slight renal enlargement and perinephric stranding as above. 4. Splenomegaly. No definite splenic vein thrombosis. 5. Pericholecystic fluid. Possible cholelithiasis/sludge. 6. 2.1 cm right thyroid nodule. Recommend outpatient ultrasound, possibly warranting fine-needle aspiration. The findings were called to Dr. Lopez Torre on 11/12/22 at [address] [place] [address] by myself. [phone]        I saw the patient personally, took a history and did a complete physical exam at the bedside. I performed complex decision making in coming up with a diagnostic and treatment plan for the patient. I reviewed the patient's past medical records, current laboratory and radiology results, and actual Xray films/EKG. I have also discussed this case with the involved ED physician.     Care Plan discussed with:    Patient, ED Doc    Risk of deterioration:  High    Total Time Coordinating Admission:  65   minutes    Total Critical Care Time:         Liz Macias MD

## 2022-11-12 NOTE — CONSULTS
A:    Nephrotic syndrome  PE/left renal v. Thrombus    P:    Send broad serologic work up: MANUEL, myeloma, ANCA, anti-pla2r, HBV, HIV, APLS   Will need a kidney biopsy   Full consult to follow    Yue Mukherjee

## 2022-11-12 NOTE — PROGRESS NOTES
Hospitalist Admission Note    NAME:  Kem Mark   :   2000   MRN:   [id number]     Date of admit: 2022    PCP: Jolynn Parrish NP    Assessment/Plan:     Acute bilateral pulmonary emboli POA  Left renal vein thrombosis POA  Presents with pleuritic flank pain  Lovenox 1 mg/kg then transition to NOAC  Check LE dopplers  Check HS troponin and pBNP  Telemetry  Present of left renal vein thrombosis argues for possible hypercoaguable state   > 300 mg protein in urine and serum albumin 1.3 raises issue of nephrotic syndrome    Check spot urine protein/creat ratio   Rule out COVID-19    If above negative, will consult hematology    SIRS POA , respiratory rate 38, temp 100.8  Acute bilateral patchy airspace disease POA questionable infection versus pulmonary infarction  Upper respiratory infection POA  Rule out COVID-19 POA with fever, upper respiratory symptoms, new PEs, diarrhea  CT chest with bilateral PE, patchy bilateral airspace disease  Concern for underlying infection in addition to the pulmonary emboli   Fever could also be due to the blood clots  COVID-19 associated with VTE  Rapid COVID-negative  Influenza A/B antigen testing negative  Check respiratory PCR  Procalcitonin 0.06 Argues against bacterial pneumonia   Hold antibiotics    2.1 cm right thyroid nodule  Needs outpatient endocrinology follow up    Morbid Obesity POA Body mass index is 44.31 kg/m². Given the patient's current clinical presentation, I have a high level of concern for decompensation if discharged from the emergency department. My assessment of this patient's clinical condition and my plan of care is as noted above.     DVT prophylaxis with lovenox 1 mg/kg    Code status: full code  NOK:    History     CHIEF COMPLAINT:     HISTORY OF PRESENT ILLNESS:    20 y.o. female presents    Unvaccinated against COVID-19    Onset of left flank pain  Patient states she was seen a few days ago at an urgent care and diagnosed with muscle strain of the back. Diagnosed with sinus infection, started on doxycycline. Prescription for Robaxin as a muscle relaxer. Increasing left flank lower thoracic area, sharp, 6 out of 10 in intensity. Worse with certain positions as well as taking a deep breath. No Headache, abdominal pain, nausea, vomiting, melena, BRBPR  + 4 loose stools today    Came to ED due to the worsening left back pain     ED  , RR to 35  Temp 100.8  WBC 9.2  PLTs 103,000  PT INR 1.0, PTT 30  Serum albumin 1.3, creatinine 0.86  UA with > 300 mg protein, nitrite neg, 10-20 WBC, 10-20 RBC, 3+ bacteria, many squames  CTA chest and CT abdomen/pelvis IMPRESSION  1. Acute bilateral lower lobe pulmonary emboli. 2. Patchy bilateral airspace disease and small to moderate left pleural  effusion. Mildly enlarged thoracic lymph nodes. 3. Left renal vein thrombus, with slight renal enlargement and perinephric  stranding as above. 4. Splenomegaly. No definite splenic vein thrombosis. 5. Pericholecystic fluid. Possible cholelithiasis/sludge. 6. 2.1 cm right thyroid nodule. Recommend outpatient ultrasound, possibly  warranting fine-needle aspiration.   Lovenox 1 mg/kg  We were called to admit the patient      Past Medical History:   Diagnosis Date    Anxiety and depression         Past Surgical History:   Procedure Laterality Date    HX WISDOM TEETH EXTRACTION         Social History     Tobacco Use    Smoking status: Never    Smokeless tobacco: Never   Substance Use Topics    Alcohol use: Yes     Comment: rarely        Family History   Problem Relation Age of Onset    No Known Problems Mother     No Known Problems Father     Cancer Sister         hodgkins lymphoma    No Known Problems Brother     Lung Cancer Maternal Grandmother     Diabetes Maternal Grandmother     Hypertension Maternal Grandmother     No Known Problems Maternal Grandfather     Cancer Paternal Grandmother     No Known Problems Paternal Grandfather         No Known Allergies     Prior to Admission medications    Not on File       Review of symptoms:     POSITIVE= Bold  Negative = not bold  General:  fever, chills, sweats, generalized weakness, weight loss     loss of appetite   Eyes:    blurred vision, eye pain, double vision  ENT:    Coryza, sore throat, trouble swallowing  Respiratory:   cough, sputum, SOB  Cardiology:   chest pain, orthopnea, PND, edema  Gastrointestinal:  abdominal pain , N/V, diarrhea, constipation, melena or BRBPR  Genitourinary:  Urgency, dysuria, hematuria  Muskuloskeletal :  Joint redness, swelling or acute joint pain, myalgias  Hematology:  easy bruising, nose or gum bleeding  Dermatological: rash, ulceration  Endocrine:   Polyuria or polydipsia, heat or hold intolerance  Neurological:  Headache, focal motor or sensory changes     Speech difficulties, memory loss  Psychological: depression, agitation      Objective:   VITALS:    Patient Vitals for the past 24 hrs:   Temp Pulse Resp BP SpO2   22 0809 98.1 °F (36.7 °C) -- -- -- --   22 0615 -- (!) 115 (!) 33 (!) 145/101 99 %   22 0600 -- (!) 118 (!) 38 (!) 156/104 99 %   22 0442 (!) 100.8 °F (38.2 °C) (!) 136 18 138/87 95 %     Temp (24hrs), Av.5 °F (37.5 °C), Min:98.1 °F (36.7 °C), Max:100.8 °F (38.2 °C)      O2 Device: None (Room air)    Wt Readings from Last 12 Encounters:   22 117.1 kg (258 lb 2.5 oz)   22 117.9 kg (260 lb)   22 121 kg (266 lb 12.8 oz)   19 108.1 kg (238 lb 6.4 oz)         PHYSICAL EXAM:     I had a face to face encounter and independently examined this patient on 22  as outlined below:    General:    Alert, cooperative in no distress     HEENT: Normocephalic, atraumatic    PERRL, EOMI  Sclera no icterus    Nasal mucosa without masses or discharge  Hearing intact to voice    Oropharynx without erythema or exudate  No thrush  Pink MM  Neck:  No meningismus, trachea midline, no carotid bruits Thyroid not enlarged, no nodules or tenderness  Lungs:   Clear to auscultation bilaterally. No wheezing or rales    No accessory muscle use or retractions. Heart:   Regular rate and rhythm,  no murmur or gallop. No LE edema     Dorsal pedis, Posterior tibial and radial pulses 2+ and symmetric  Abdomen:   Soft, non-tender. Not distended. Bowel sounds normal.     No masses, No Hepatosplenomegaly, No Rebound or guarding  Lymph nodes: No cervical or inguinal RICHIE  Musculoskeletal:  No Joint swelling, erythema, warmth. No Cyanosis or clubbing  Skin:      No rashes  No Ulcers. Not Jaundiced   No nodules or thickening    Capillary refill normal  Neurologic: Alert and oriented X 3, follows commands     Cranial nerves 2 to 12 intact    Symmetric motor strength bilaterally       LAB DATA REVIEWED:    Recent Results (from the past 12 hour(s))   CBC WITH AUTOMATED DIFF    Collection Time: 11/12/22  5:04 AM   Result Value Ref Range    WBC 9.2 3.6 - 11.0 K/uL    RBC 4.69 3.80 - 5.20 M/uL    HGB 13.1 11.5 - 16.0 g/dL    HCT 40.1 35.0 - 47.0 %    MCV 85.5 80.0 - 99.0 FL    MCH 27.9 26.0 - 34.0 PG    MCHC 32.7 30.0 - 36.5 g/dL    RDW 12.9 11.5 - 14.5 %    PLATELET 008 (L) 085 - 400 K/uL    MPV ABNORMAL 8.9 - 12.9 FL    NRBC 0.0 0  WBC    ABSOLUTE NRBC 0.00 0.00 - 0.01 K/uL    NEUTROPHILS 80 (H) 32 - 75 %    LYMPHOCYTES 13 12 - 49 %    MONOCYTES 7 5 - 13 %    EOSINOPHILS 0 0 - 7 %    BASOPHILS 0 0 - 1 %    IMMATURE GRANULOCYTES 0 0.0 - 0.5 %    ABS. NEUTROPHILS 7.3 1.8 - 8.0 K/UL    ABS. LYMPHOCYTES 1.2 0.8 - 3.5 K/UL    ABS. MONOCYTES 0.6 0.0 - 1.0 K/UL    ABS. EOSINOPHILS 0.0 0.0 - 0.4 K/UL    ABS. BASOPHILS 0.0 0.0 - 0.1 K/UL    ABS. IMM.  GRANS. 0.0 0.00 - 0.04 K/UL    DF AUTOMATED     METABOLIC PANEL, COMPREHENSIVE    Collection Time: 11/12/22  5:04 AM   Result Value Ref Range    Sodium 136 136 - 145 mmol/L    Potassium 3.6 3.5 - 5.1 mmol/L    Chloride 105 97 - 108 mmol/L    CO2 22 21 - 32 mmol/L    Anion gap 9 5 - 15 mmol/L    Glucose 116 (H) 65 - 100 mg/dL    BUN 9 6 - 20 MG/DL    Creatinine 0.86 0.55 - 1.02 MG/DL    BUN/Creatinine ratio 10 (L) 12 - 20      eGFR >60 >60 ml/min/1.73m2    Calcium 7.5 (L) 8.5 - 10.1 MG/DL    Bilirubin, total 0.7 0.2 - 1.0 MG/DL    ALT (SGPT) 16 12 - 78 U/L    AST (SGOT) 28 15 - 37 U/L    Alk.  phosphatase 84 45 - 117 U/L    Protein, total 6.8 6.4 - 8.2 g/dL    Albumin 1.3 (L) 3.5 - 5.0 g/dL    Globulin 5.5 (H) 2.0 - 4.0 g/dL    A-G Ratio 0.2 (L) 1.1 - 2.2     COVID-19 RAPID TEST    Collection Time: 11/12/22  5:04 AM   Result Value Ref Range    Specimen source Nasopharyngeal      COVID-19 rapid test Not detected NOTD     INFLUENZA A+B VIRAL AGS    Collection Time: 11/12/22  5:04 AM   Result Value Ref Range    Influenza A Antigen Negative NEG      Influenza B Antigen Negative NEG     URINALYSIS W/ REFLEX CULTURE    Collection Time: 11/12/22  5:04 AM    Specimen: Miscellaneous sample; Urine    Urine specimen   Result Value Ref Range    Color DARK YELLOW      Appearance CLOUDY (A) CLEAR      Specific gravity 1.020 1.003 - 1.030      pH (UA) 7.0 5.0 - 8.0      Protein >300 (A) NEG mg/dL    Glucose Negative NEG mg/dL    Ketone TRACE (A) NEG mg/dL    Blood LARGE (A) NEG      Urobilinogen 1.0 0.2 - 1.0 EU/dL    Nitrites Negative NEG      Leukocyte Esterase SMALL (A) NEG      WBC 10-20 0 - 4 /hpf    RBC 10-20 0 - 5 /hpf    Epithelial cells MANY (A) FEW /lpf    Bacteria 3+ (A) NEG /hpf    UA:UC IF INDICATED URINE CULTURE ORDERED (A) CNI     BILIRUBIN, CONFIRM    Collection Time: 11/12/22  5:04 AM   Result Value Ref Range    Bilirubin UA, confirm Negative NEG     PROTHROMBIN TIME + INR    Collection Time: 11/12/22  7:41 AM   Result Value Ref Range    INR 1.1 0.9 - 1.1      Prothrombin time 11.2 (H) 9.0 - 11.1 sec   PTT    Collection Time: 11/12/22  7:41 AM   Result Value Ref Range    aPTT 30.2 22.1 - 31.0 sec    aPTT, therapeutic range     58.0 - 77.0 SECS   POC LACTIC ACID    Collection Time: 11/12/22  7:53 AM   Result Value Ref Range    Lactic Acid (POC) 0.86 0.40 - 2.00 mmol/L         CT Results  (Last 48 hours)                 11/12/22 0723  CTA CHEST W OR W WO CONT Final result    Impression:      1. Acute bilateral lower lobe pulmonary emboli. 2. Patchy bilateral airspace disease and small to moderate left pleural   effusion. Mildly enlarged thoracic lymph nodes. 3. Left renal vein thrombus, with slight renal enlargement and perinephric   stranding as above. 4. Splenomegaly. No definite splenic vein thrombosis. 5. Pericholecystic fluid. Possible cholelithiasis/sludge. 6. 2.1 cm right thyroid nodule. Recommend outpatient ultrasound, possibly   warranting fine-needle aspiration. The findings were called to Dr. Enriqueta Jones on 11/12/22 at [address] [place] [address] by myself. [phone]                   Narrative:  INDICATION: Tachycardia, tachypnea, left flank pain       COMPARISON: None       TECHNIQUE:  CTA imaging of the chest,  and CT imaging of the abdomen and pelvis   was performed after the uneventful intravenous administration of contrast   material.  Coronal and sagittal reconstructions were generated. 3D image   postprocessing was performed. CT dose reduction was achieved through use of a   standardized protocol tailored for this examination and automatic exposure   control for dose modulation. FINDINGS:       THYROID: 2.1 cm right thyroid nodule. Small left thyroid nodules. MEDIASTINUM/SOLANGE: Mildly enlarged bilateral axillary lymph nodes, 1.2-1.3 cm. Right paratracheal lymph nodes at the 1.1 cm. HEART/PERICARDIUM: Unremarkable. AORTA: No aneurysm. PULMONARY ARTERIES: Suboptimal contrast opacification of the pulmonary arterial   circuit, though there are bilateral lower lobe pulmonary emboli. LUNGS/PLEURA: Patchy airspace disease in the bilateral lower lobes and lingula. Small to moderate left pleural effusion. No pneumothorax. BONES: No destructive bone lesion.    ADDITIONAL COMMENTS: N/A       LIVER: No mass or biliary dilatation. GALLBLADDER: Pericholecystic fluid. Possible stones/sludge within the   gallbladder. SPLEEN: Enlarged, 16.5 cm in craniocaudal dimension. No definite splenic vein   thrombus. PANCREAS: No mass or ductal dilatation. ADRENALS: No mass. KIDNEYS: Mild asymmetric enlargement of the left kidney relative to the right,   with mild left perinephric stranding. No hydronephrosis or nephrolithiasis. Subtle filling defects in the left renal vein   GI TRACT: No bowel obstruction or wall thickening   PERITONEUM: No free air or free fluid. APPENDIX: Unremarkable. Erleen Garcia RETROPERITONEUM: No lymphadenopathy or aortic aneurysm. URINARY BLADDER: No mass or calculus. LYMPH NODES: Prominent bilateral external iliac chain and inguinal lymph nodes. REPRODUCTIVE ORGANS: Unremarkable. FREE FLUID: None. BONES: No destructive bone lesion. ADDITIONAL COMMENTS: N/A.           11/12/22 0723  CT ABD PELV W CONT Final result    Impression:      1. Acute bilateral lower lobe pulmonary emboli. 2. Patchy bilateral airspace disease and small to moderate left pleural   effusion. Mildly enlarged thoracic lymph nodes. 3. Left renal vein thrombus, with slight renal enlargement and perinephric   stranding as above. 4. Splenomegaly. No definite splenic vein thrombosis. 5. Pericholecystic fluid. Possible cholelithiasis/sludge. 6. 2.1 cm right thyroid nodule. Recommend outpatient ultrasound, possibly   warranting fine-needle aspiration. The findings were called to Dr. Maria Luz Christiansen on 11/12/22 at [address] [place] [address] by myself. [phone]           Narrative:  INDICATION: Tachycardia, tachypnea, left flank pain       COMPARISON: None       TECHNIQUE:  CTA imaging of the chest,  and CT imaging of the abdomen and pelvis   was performed after the uneventful intravenous administration of contrast   material.  Coronal and sagittal reconstructions were generated.   3D image   postprocessing was performed. CT dose reduction was achieved through use of a   standardized protocol tailored for this examination and automatic exposure   control for dose modulation. FINDINGS:       THYROID: 2.1 cm right thyroid nodule. Small left thyroid nodules. MEDIASTINUM/SOLANGE: Mildly enlarged bilateral axillary lymph nodes, 1.2-1.3 cm. Right paratracheal lymph nodes at the 1.1 cm. HEART/PERICARDIUM: Unremarkable. AORTA: No aneurysm. PULMONARY ARTERIES: Suboptimal contrast opacification of the pulmonary arterial   circuit, though there are bilateral lower lobe pulmonary emboli. LUNGS/PLEURA: Patchy airspace disease in the bilateral lower lobes and lingula. Small to moderate left pleural effusion. No pneumothorax. BONES: No destructive bone lesion. ADDITIONAL COMMENTS: N/A       LIVER: No mass or biliary dilatation. GALLBLADDER: Pericholecystic fluid. Possible stones/sludge within the   gallbladder. SPLEEN: Enlarged, 16.5 cm in craniocaudal dimension. No definite splenic vein   thrombus. PANCREAS: No mass or ductal dilatation. ADRENALS: No mass. KIDNEYS: Mild asymmetric enlargement of the left kidney relative to the right,   with mild left perinephric stranding. No hydronephrosis or nephrolithiasis. Subtle filling defects in the left renal vein   GI TRACT: No bowel obstruction or wall thickening   PERITONEUM: No free air or free fluid. APPENDIX: Unremarkable. Algis Broaden RETROPERITONEUM: No lymphadenopathy or aortic aneurysm. URINARY BLADDER: No mass or calculus. LYMPH NODES: Prominent bilateral external iliac chain and inguinal lymph nodes. REPRODUCTIVE ORGANS: Unremarkable. FREE FLUID: None. BONES: No destructive bone lesion. ADDITIONAL COMMENTS: N/A.                     CTA CHEST W OR W WO CONT    Result Date: 11/12/2022  1. Acute bilateral lower lobe pulmonary emboli. 2. Patchy bilateral airspace disease and small to moderate left pleural effusion.  Mildly enlarged thoracic lymph nodes. 3. Left renal vein thrombus, with slight renal enlargement and perinephric stranding as above. 4. Splenomegaly. No definite splenic vein thrombosis. 5. Pericholecystic fluid. Possible cholelithiasis/sludge. 6. 2.1 cm right thyroid nodule. Recommend outpatient ultrasound, possibly warranting fine-needle aspiration. The findings were called to Dr. Raheel Mejias on 11/12/22 at [address] [place] [address] by myself. [phone]     CT ABD PELV W CONT    Result Date: 11/12/2022  1. Acute bilateral lower lobe pulmonary emboli. 2. Patchy bilateral airspace disease and small to moderate left pleural effusion. Mildly enlarged thoracic lymph nodes. 3. Left renal vein thrombus, with slight renal enlargement and perinephric stranding as above. 4. Splenomegaly. No definite splenic vein thrombosis. 5. Pericholecystic fluid. Possible cholelithiasis/sludge. 6. 2.1 cm right thyroid nodule. Recommend outpatient ultrasound, possibly warranting fine-needle aspiration. The findings were called to Dr. Raheel Mejais on 11/12/22 at [address] [place] [address] by myself. [phone]        I saw the patient personally, took a history and did a complete physical exam at the bedside. I performed complex decision making in coming up with a diagnostic and treatment plan for the patient. I reviewed the patient's past medical records, current laboratory and radiology results, and actual Xray films/EKG. I have also discussed this case with the involved ED physician.     Care Plan discussed with:    Patient,  ED Doc    Risk of deterioration:  High    Total Time Coordinating Admission:  65   minutes    Total Critical Care Time:         Ifeoma Dennis MD

## 2022-11-12 NOTE — PROGRESS NOTES
1730: Lab called to inform NS that labs drawn in ED hemolyzed and need redrawn. Labs reordered for collection.

## 2022-11-12 NOTE — PROGRESS NOTES
Problem: Falls - Risk of  Goal: *Absence of Falls  Description: Document Karla Diallo Fall Risk and appropriate interventions in the flowsheet.   Outcome: Progressing Towards Goal  Note: Fall Risk Interventions:            Medication Interventions: Evaluate medications/consider consulting pharmacy                   Problem: Patient Education: Go to Patient Education Activity  Goal: Patient/Family Education  Outcome: Progressing Towards Goal

## 2022-11-12 NOTE — PROGRESS NOTES
11/12/22 1736   Vital Signs   Temp 98.9 °F (37.2 °C)   Temp Source Oral   Pulse (Heart Rate) (!) 120   Heart Rate Source Monitor   Resp Rate (!) 34   O2 Sat (%) 99 %   Level of Consciousness 0   BP (!) 133/108   MAP (Calculated) 116   MEWS Score 5     HR, RR and BP elevated.  Patient returning to the chair after ambulating to the bathroom

## 2022-11-12 NOTE — ED NOTES
Verbal shift change report given to Alise Kauffman RN (oncoming nurse) by Sophia Swartz RN (offgoing nurse). Report included the following information SBAR, Kardex, ED Summary, MAR, and Recent Results.

## 2022-11-13 LAB
ALBUMIN SERPL-MCNC: 1.3 G/DL (ref 3.5–5)
ALBUMIN/GLOB SERPL: 0.3 {RATIO} (ref 1.1–2.2)
ALP SERPL-CCNC: 75 U/L (ref 45–117)
ALT SERPL-CCNC: 12 U/L (ref 12–78)
ANION GAP SERPL CALC-SCNC: 8 MMOL/L (ref 5–15)
AST SERPL-CCNC: 22 U/L (ref 15–37)
BACTERIA SPEC CULT: NORMAL
BASOPHILS # BLD: 0 K/UL (ref 0–0.1)
BASOPHILS NFR BLD: 0 % (ref 0–1)
BILIRUB SERPL-MCNC: 0.6 MG/DL (ref 0.2–1)
BNP SERPL-MCNC: 8 PG/ML
BUN SERPL-MCNC: 10 MG/DL (ref 6–20)
BUN/CREAT SERPL: 13 (ref 12–20)
CALCIUM SERPL-MCNC: 7.2 MG/DL (ref 8.5–10.1)
CHLORIDE SERPL-SCNC: 105 MMOL/L (ref 97–108)
CO2 SERPL-SCNC: 22 MMOL/L (ref 21–32)
CREAT SERPL-MCNC: 0.79 MG/DL (ref 0.55–1.02)
DIFFERENTIAL METHOD BLD: ABNORMAL
EOSINOPHIL # BLD: 0 K/UL (ref 0–0.4)
EOSINOPHIL NFR BLD: 0 % (ref 0–7)
ERYTHROCYTE [DISTWIDTH] IN BLOOD BY AUTOMATED COUNT: 12.8 % (ref 11.5–14.5)
GLOBULIN SER CALC-MCNC: 5.1 G/DL (ref 2–4)
GLUCOSE SERPL-MCNC: 101 MG/DL (ref 65–100)
HCT VFR BLD AUTO: 35.2 % (ref 35–47)
HGB BLD-MCNC: 11.6 G/DL (ref 11.5–16)
HIV 1+2 AB+HIV1 P24 AG SERPL QL IA: NONREACTIVE
HIV12 RESULT COMMENT, HHIVC: NORMAL
IMM GRANULOCYTES # BLD AUTO: 0 K/UL (ref 0–0.04)
IMM GRANULOCYTES NFR BLD AUTO: 0 % (ref 0–0.5)
LYMPHOCYTES # BLD: 1 K/UL (ref 0.8–3.5)
LYMPHOCYTES NFR BLD: 11 % (ref 12–49)
MCH RBC QN AUTO: 27.7 PG (ref 26–34)
MCHC RBC AUTO-ENTMCNC: 33 G/DL (ref 30–36.5)
MCV RBC AUTO: 84 FL (ref 80–99)
MONOCYTES # BLD: 0.4 K/UL (ref 0–1)
MONOCYTES NFR BLD: 4 % (ref 5–13)
NEUTS SEG # BLD: 8.1 K/UL (ref 1.8–8)
NEUTS SEG NFR BLD: 85 % (ref 32–75)
NRBC # BLD: 0 K/UL (ref 0–0.01)
NRBC BLD-RTO: 0 PER 100 WBC
PLATELET # BLD AUTO: 115 K/UL (ref 150–400)
POTASSIUM SERPL-SCNC: 3.3 MMOL/L (ref 3.5–5.1)
PROT SERPL-MCNC: 6.4 G/DL (ref 6.4–8.2)
RBC # BLD AUTO: 4.19 M/UL (ref 3.8–5.2)
SERVICE CMNT-IMP: NORMAL
SODIUM SERPL-SCNC: 135 MMOL/L (ref 136–145)
TROPONIN-HIGH SENSITIVITY: 8 NG/L (ref 0–51)
TSH SERPL DL<=0.05 MIU/L-ACNC: 6.8 UIU/ML (ref 0.36–3.74)
WBC # BLD AUTO: 9.6 K/UL (ref 3.6–11)

## 2022-11-13 PROCEDURE — 86037 ANCA TITER EACH ANTIBODY: CPT

## 2022-11-13 PROCEDURE — 86147 CARDIOLIPIN ANTIBODY EA IG: CPT

## 2022-11-13 PROCEDURE — 74011250637 HC RX REV CODE- 250/637: Performed by: INTERNAL MEDICINE

## 2022-11-13 PROCEDURE — 74011250637 HC RX REV CODE- 250/637: Performed by: PHYSICIAN ASSISTANT

## 2022-11-13 PROCEDURE — 83880 ASSAY OF NATRIURETIC PEPTIDE: CPT

## 2022-11-13 PROCEDURE — 84443 ASSAY THYROID STIM HORMONE: CPT

## 2022-11-13 PROCEDURE — 84484 ASSAY OF TROPONIN QUANT: CPT

## 2022-11-13 PROCEDURE — 83516 IMMUNOASSAY NONANTIBODY: CPT

## 2022-11-13 PROCEDURE — 86146 BETA-2 GLYCOPROTEIN ANTIBODY: CPT

## 2022-11-13 PROCEDURE — 83521 IG LIGHT CHAINS FREE EACH: CPT

## 2022-11-13 PROCEDURE — 85613 RUSSELL VIPER VENOM DILUTED: CPT

## 2022-11-13 PROCEDURE — 80053 COMPREHEN METABOLIC PANEL: CPT

## 2022-11-13 PROCEDURE — 36415 COLL VENOUS BLD VENIPUNCTURE: CPT

## 2022-11-13 PROCEDURE — 94760 N-INVAS EAR/PLS OXIMETRY 1: CPT

## 2022-11-13 PROCEDURE — 74011250636 HC RX REV CODE- 250/636: Performed by: INTERNAL MEDICINE

## 2022-11-13 PROCEDURE — 85025 COMPLETE CBC W/AUTO DIFF WBC: CPT

## 2022-11-13 PROCEDURE — 74011250637 HC RX REV CODE- 250/637: Performed by: STUDENT IN AN ORGANIZED HEALTH CARE EDUCATION/TRAINING PROGRAM

## 2022-11-13 PROCEDURE — 65660000001 HC RM ICU INTERMED STEPDOWN

## 2022-11-13 PROCEDURE — 74011000250 HC RX REV CODE- 250: Performed by: INTERNAL MEDICINE

## 2022-11-13 RX ORDER — POTASSIUM CHLORIDE 20 MEQ/1
20 TABLET, EXTENDED RELEASE ORAL
Status: COMPLETED | OUTPATIENT
Start: 2022-11-13 | End: 2022-11-13

## 2022-11-13 RX ORDER — HYDROXYZINE HYDROCHLORIDE 10 MG/1
25 TABLET, FILM COATED ORAL
Status: DISCONTINUED | OUTPATIENT
Start: 2022-11-13 | End: 2022-11-22 | Stop reason: HOSPADM

## 2022-11-13 RX ADMIN — MELATONIN 3 MG: at 22:55

## 2022-11-13 RX ADMIN — SODIUM CHLORIDE, PRESERVATIVE FREE 10 ML: 5 INJECTION INTRAVENOUS at 15:06

## 2022-11-13 RX ADMIN — ENOXAPARIN SODIUM 120 MG: 150 INJECTION SUBCUTANEOUS at 20:07

## 2022-11-13 RX ADMIN — ACETAMINOPHEN 650 MG: 325 TABLET ORAL at 18:03

## 2022-11-13 RX ADMIN — ACETAMINOPHEN 650 MG: 325 TABLET ORAL at 10:33

## 2022-11-13 RX ADMIN — ENOXAPARIN SODIUM 120 MG: 150 INJECTION SUBCUTANEOUS at 08:56

## 2022-11-13 RX ADMIN — POTASSIUM CHLORIDE 20 MEQ: 1500 TABLET, EXTENDED RELEASE ORAL at 08:55

## 2022-11-13 RX ADMIN — SODIUM CHLORIDE, PRESERVATIVE FREE 10 ML: 5 INJECTION INTRAVENOUS at 06:20

## 2022-11-13 RX ADMIN — HYDROXYZINE HYDROCHLORIDE 25 MG: 10 TABLET ORAL at 20:07

## 2022-11-13 RX ADMIN — ACETAMINOPHEN 650 MG: 325 TABLET ORAL at 23:22

## 2022-11-13 RX ADMIN — ACETAMINOPHEN 650 MG: 325 TABLET ORAL at 02:27

## 2022-11-13 RX ADMIN — SODIUM CHLORIDE, PRESERVATIVE FREE 10 ML: 5 INJECTION INTRAVENOUS at 20:22

## 2022-11-13 NOTE — PROGRESS NOTES
11/12/22 2245   Vitals   Temp 99.9 °F (37.7 °C)   Pulse (Heart Rate) (!) 142  (post-activity)   Resp Rate 24   O2 Sat (%) 94 %   Level of Consciousness 0   BP (!) 126/94   MAP (Monitor) 102   MAP (Calculated) 105   Cardiac Rhythm Sinus Tachy   MEWS Score 5   Pain 1   Pain Scale 1 Numeric (0 - 10)   Pain Intensity 1 0   Patient Stated Pain Goal 0   Pain Reassessment 1 Yes   POSS Scale   Opioid Sedation Scale 1   Oxygen Therapy   O2 Device None (Room air)   Patient Observation   Repositioned Head of bed elevated (degrees)   Patient Turned Turns self   Ambulate Ambulate in room   Activity Returned to bed   Mode of Transportation Wheelchair; Ambulatory   MEWS 5 r/t elevated HR, patient returned to bed post-activity, charge RN aware, will continue to monitor

## 2022-11-13 NOTE — PROGRESS NOTES
11/12/22 1927   Vitals   Temp 97.9 °F (36.6 °C)   Pulse (Heart Rate) (!) 122   Resp Rate 20   O2 Sat (%) 98 %   Level of Consciousness 0   /80   MAP (Monitor) 91   MAP (Calculated) 97   Cardiac Rhythm Sinus Tachy   MEWS Score 3   Box Number [id number]   Electrodes Replaced No   Pain 1   Pain Scale 1 Numeric (0 - 10)   Pain Intensity 1 0   Patient Stated Pain Goal 0   Pain Reassessment 1 Yes   POSS Scale   Opioid Sedation Scale 1   Oxygen Therapy   O2 Device None (Room air)   Patient Observation   Repositioned Up in chair   Patient Turned Up in Chair   Ambulate Ambulate in room   Activity In chair   Mode of Transportation Wheelchair; Ambulatory   MEWS 3 r/t elevated HR, charge RN aware and RRT RN aware, will continue to monitor

## 2022-11-13 NOTE — PROGRESS NOTES
Problem: Falls - Risk of  Goal: *Absence of Falls  Description: Document Emelia Patino Fall Risk and appropriate interventions in the flowsheet.   Outcome: Progressing Towards Goal  Note: Fall Risk Interventions:            Medication Interventions: Assess postural VS orthostatic hypotension, Bed/chair exit alarm, Patient to call before getting OOB, Teach patient to arise slowly                   Problem: Pulmonary Embolism Care Plan (Adult)  Goal: *Improvement of existing pulmonary embolism  Outcome: Progressing Towards Goal  Goal: *Absence of bleeding  Outcome: Progressing Towards Goal  Goal: *Labs within defined limits  Outcome: Progressing Towards Goal     Problem: Patient Education: Go to Patient Education Activity  Goal: Patient/Family Education  Outcome: Progressing Towards Goal

## 2022-11-13 NOTE — PROGRESS NOTES
11/13/22 0736   Vital Signs   Temp 99.8 °F (37.7 °C)   Temp Source Oral   Pulse (Heart Rate) (!) 123   Heart Rate Source Monitor   Cardiac Rhythm Sinus Tachy   Resp Rate 24   O2 Sat (%) 97 %   Level of Consciousness 0   BP (!) 131/104   MAP (Monitor) 113   MAP (Calculated) 113   MEWS Score 4

## 2022-11-13 NOTE — PROGRESS NOTES
End of Shift Note    Bedside shift change report given to Yakelin Cheng RN (oncoming nurse) by Kristan Walsh RN (offgoing nurse). Report included the following information SBAR, Kardex, ED Summary, Intake/Output, MAR, and Recent Results    Shift worked:  night     Shift summary and any significant changes:     Pt experiencing general discomfort overnight, PRN tylenol given x1 with some relief     Febrile overnight, ice packs applied and tylenol given, temp down to 99.5 this AM     Concerns for physician to address:  Source of infection? Antibiotics? Hematology consult? Zone phone for oncoming shift:   xxx       Activity:  Activity Level: Up ad leticia  Number times ambulated in hallways past shift: 0  Number of times OOB to chair past shift: 1    Cardiac:   Cardiac Monitoring: Yes      Cardiac Rhythm: Sinus Tachy    Access:  Current line(s): PIV     Genitourinary:   Urinary status: voiding    Respiratory:   O2 Device: None (Room air)  Chronic home O2 use?: NO  Incentive spirometer at bedside: NO       GI:  Last Bowel Movement Date: 11/12/22  Current diet:  ADULT DIET Regular  Passing flatus: YES  Tolerating current diet: YES       Pain Management:   Patient states pain is manageable on current regimen: YES    Skin:  Gaurav Score: 21  Interventions: increase time out of bed    Patient Safety:  Fall Score:  Total Score: 1  Interventions: gripper socks       Length of Stay:  Expected LOS: - - -  Actual LOS: 1      Kristan Walsh RN

## 2022-11-13 NOTE — PROGRESS NOTES
0700: Bedside and Verbal shift change report given to Jayme Escamilla (oncoming nurse) by Pedro Ramachandran (offgoing nurse). Report included the following information SBAR, Kardex, Intake/Output, MAR, and Recent Results. 0830: Dr. Beau Sauceda informed of patient elevated HR. No new orders at this time. 1520: Patient reporting numbness/tingling when sitting on the toilet to use the bathroom. Dr. Beau Sauceda notified. 1900 adEnd of Shift Note    Bedside shift change report given to RN (oncoming nurse) by Agatha Cole RN (offgoing nurse). Report included the following information SBAR, Kardex, Intake/Output, MAR, and Recent Results    Shift worked:  7a-7p     Shift summary and any significant changes:     Seen by nephrology. HR and Bp continues to be elevated. Concerns for physician to address:        Zone phone for oncoming shift:           Activity:  Activity Level: Up ad leticia  Number times ambulated in hallways past shift: 0  Number of times OOB to chair past shift: 3    Cardiac:   Cardiac Monitoring: Yes      Cardiac Rhythm: Sinus Tachy    Access:  Current line(s): PIV     Genitourinary:   Urinary status: voiding    Respiratory:   O2 Device: None (Room air)  Chronic home O2 use?: NO  Incentive spirometer at bedside: NO       GI:  Last Bowel Movement Date: 11/13/22  Current diet:  ADULT DIET Regular  Passing flatus: YES  Tolerating current diet: YES       Pain Management:   Patient states pain is manageable on current regimen: YES    Skin:  Gaurav Score: 21  Interventions: increase time out of bed    Patient Safety:  Fall Score:  Total Score: 1  Interventions: gripper socks, pt to call before getting OOB, and stay with me (per policy)       Length of Stay:  Expected LOS: - - -  Actual LOS: [address], [place]

## 2022-11-13 NOTE — PROGRESS NOTES
Hospitalist Progress Note    NAME: Amaris Terrell   :  2000   MRN:  [id number]            Subjective:     Chief Complaint / Reason for Physician Visit  Discussed with RN events overnight. Pt c/o right sided flank pain. Otherwise all questions answered. Review of Systems:  Symptom Y/N Comments  Symptom Y/N Comments   Fever/Chills n   Chest Pain n    Poor Appetite n   Edema n    Cough n   Abdominal Pain n    Sputum n   Joint Pain n    SOB/LUNA n   Pruritis/Rash n    Nausea/vomit n   Tolerating PT/OT     Diarrhea n   Tolerating Diet     Constipation n   Other       Could NOT obtain due to:      Objective:     VITALS:   Last 24hrs VS reviewed since prior progress note. Most recent are:  Patient Vitals for the past 24 hrs:   Temp Pulse Resp BP SpO2   22 1459 99.8 °F (37.7 °C) (!) 126 22 (!) 143/86 98 %   22 1200 -- (!) 158 -- -- --   22 1034 99.3 °F (37.4 °C) (!) 130 22 (!) 134/91 97 %   22 0736 99.8 °F (37.7 °C) (!) 123 24 (!) 131/104 97 %   22 0542 99.5 °F (37.5 °C) -- -- -- --   22 0446 (!) 101.3 °F (38.5 °C) -- -- -- --   22 0330 (!) 100.5 °F (38.1 °C) (!) 138 22 133/83 94 %   22 0014 -- (!) 128 -- -- --   22 2245 99.9 °F (37.7 °C) (!) 142 24 (!) 126/94 94 %   22 1927 97.9 °F (36.6 °C) (!) 122 20 132/80 98 %   22 1736 98.9 °F (37.2 °C) (!) 120 (!) 34 (!) 133/108 99 %       Intake/Output Summary (Last 24 hours) at 2022 1658  Last data filed at 2022 1200  Gross per 24 hour   Intake 480 ml   Output 240 ml   Net 240 ml        PHYSICAL EXAM:  General: WD, WN. Alert, cooperative, no acute distress    EENT:  EOMI. Anicteric sclerae. MMM  Resp:  CTA bilaterally, no wheezing or rales. No accessory muscle use  CV:  Regular  rhythm,  No edema  GI:  Soft, Non distended, Non tender. +Bowel sounds  Neurologic:  Alert and oriented X 3, normal speech,   Psych:   Good insight. Not anxious nor agitated  Skin:  No rashes.   No jaundice    Procedures: see electronic medical records for all procedures/Xrays and details which were not copied into this note but were reviewed prior to creation of Plan. LABS:  I reviewed today's most current labs and imaging studies.   Pertinent labs include:  Recent Labs     11/13/22 0219 11/12/22  0504   WBC 9.6 9.2   HGB 11.6 13.1   HCT 35.2 40.1   * 103*     Recent Labs     11/13/22 0219 11/12/22  0741 11/12/22  0504   *  --  136   K 3.3*  --  3.6     --  105   CO2 22  --  22   *  --  116*   BUN 10  --  9   CREA 0.79  --  0.86   CA 7.2*  --  7.5*   ALB 1.3*  --  1.3*   TBILI 0.6  --  0.7   ALT 12  --  16   INR  --  1.1  --        Signed: Elizabeth Doran MD        Reviewed most current lab test results and cultures  YES  Reviewed most current radiology test results   YES  Review and summation of old records today    NO  Reviewed patient's current orders and MAR    YES  PMH/SH reviewed - no change compared to H&P      Assessment / Plan:  Acute bilateral pulmonary emboli POA  Left renal vein thrombosis POA  Presents with pleuritic flank pain  LE dopplers-negative  HS troponin and pBNP-negative  COVID negative  Telemetry  Presence of left renal vein thrombosis argues for possible hypercoaguable state              Has hormonal implant for pregnancy prevention              > 300 mg protein in urine and serum albumin 1.3 raises issue of nephrotic syndrome                          Check spot urine protein/creat ratio              If above negative, will consult hematology  Lovenox 1 mg/kg then transition to NOAC after renal bx  Hopefully discharge in 1-2 days     SIRS POA , respiratory rate 38, temp 100.8  Acute bilateral patchy airspace disease POA questionable infection versus pulmonary infarction  Upper respiratory infection POA  Ruled out COVID-19 POA with fever, upper respiratory symptoms, new PEs, diarrhea  CT chest with bilateral PE, patchy bilateral airspace disease  Concern for underlying infection in addition to the pulmonary emboli              Fever could also be due to the blood clots  Rapid COVID-negative  Influenza A/B antigen testing negative  Respiratory PCR-negative  Procalcitonin 0.06 Argues against bacterial pneumonia  Ucx negative              Hold antibiotics     2.1 cm right thyroid nodule  Followed prior to admit, reports she had a biopsy that was benign several months ago  Check TSH  Follow up T3, T4  PCP follow up     Cholelithiasis POA  Intermittent RUQ pain, currently non tender    40 or above Morbid obesity / Body mass index is 44.2 kg/m². Code status: Full  Prophylaxis: Lovenox  Recommended Disposition: Home w/Family     ________________________________________________________________________  Care Plan discussed with:    Comments   Patient x    Family      RN     Care Manager     Consultant                        Multidiciplinary team rounds were held today with , nursing, pharmacist and clinical coordinator. Patient's plan of care was discussed; medications were reviewed and discharge planning was addressed.      ________________________________________________________________________  Total NON critical care TIME:  35   Minutes    Total CRITICAL CARE TIME Spent:   Minutes non procedure based      Comments   >50% of visit spent in counseling and coordination of care     ________________________________________________________________________  Moni Bright MD

## 2022-11-13 NOTE — PROGRESS NOTES
11/13/22 0330   Vitals   Temp (!) 100.5 °F (38.1 °C)   Temp Source Oral   Pulse (Heart Rate) (!) 138   Heart Rate Source Monitor   Resp Rate 22   O2 Sat (%) 94 %   Level of Consciousness 0   /83   MAP (Calculated) 100   Cardiac Rhythm Sinus Tachy   MEWS Score 5   POSS Scale   Opioid Sedation Scale 1   Oxygen Therapy   O2 Device None (Room air)   Patient Observation   Repositioned Head of bed elevated (degrees)   Patient Turned Turns self   Ambulate Ambulate in room   Activity In bed;Resting quietly   Mode of Transportation Wheelchair; Ambulatory   MEWS 5 r/t elevated temperature and HR, tylenol given, charge RN aware, will reassess

## 2022-11-13 NOTE — PROGRESS NOTES
Problem: Falls - Risk of  Goal: *Absence of Falls  Description: Document Kallie Trever Fall Risk and appropriate interventions in the flowsheet.   Outcome: Progressing Towards Goal  Note: Fall Risk Interventions:            Medication Interventions: Evaluate medications/consider consulting pharmacy, Teach patient to arise slowly           Problem: Pulmonary Embolism Care Plan (Adult)  Goal: *Improvement of existing pulmonary embolism  Outcome: Progressing Towards Goal  Goal: *Absence of bleeding  Outcome: Progressing Towards Goal  Goal: *Labs within defined limits  Outcome: Progressing Towards Goal

## 2022-11-13 NOTE — CONSULTS
Consultation Note    NAME: Jerman Pires   :  2000   MRN:  [id number]     Date/Time:  2022 1:32 PM    I have been asked to see this patient by Dr. Cecily Fang  for advice/opinion re: NS.     Assessment :    Plan:  Nephrotic syndrome  PE  Left renal vein thrombus  HTN  Obesity  Fever, tachycardia  HTN  Mild hypokalemia, hyponatremia  Mild thrombocytopenia Albumin 1.3, upc 5.6 grams, creatinine 0.8    Neg urine culture    S/p amoxicillin    HCV neg ; neg strept in Oct '22    Pending broad serologic workup:  MANUEL, myeloma, ANCA, anti-pla2r, HBV, HIV, APLS     Will need a kidney biopsy -- hold Lovenox for the entire day prior to biopsy (tomorrow can check IR's ability to do biopsy on Wednesday?)    Hold on IV steroid for now given fevers       Subjective:   CHIEF COMPLAINT:  flank pain    HISTORY OF PRESENT ILLNESS:     Carmen Clarke is a 20 y.o.   female who has a history of the below. States she was seen for an ear infection about a month ago. Presented to outside ER with flank pain on Thursday and was sent home with a pulled muscle. Back yesterday with worsened flank and chest pain. + casillas and cough. Takes nsaids only rarely. No rash, joint pains. No gross hematuria or foamy urine. No family h/o kidney issues. She was a healthy kid. No miscarriages or pregnancies. We had a long talk about the above.     Past Medical History:   Diagnosis Date    Anxiety and depression       Past Surgical History:   Procedure Laterality Date    HX WISDOM TEETH EXTRACTION       Social History     Tobacco Use    Smoking status: Never    Smokeless tobacco: Never   Substance Use Topics    Alcohol use: Yes     Comment: rarely      Family History   Problem Relation Age of Onset    No Known Problems Mother     No Known Problems Father     Cancer Sister         hodgkins lymphoma    No Known Problems Brother     Lung Cancer Maternal Grandmother     Diabetes Maternal Grandmother     Hypertension Maternal Grandmother     No Known Problems Maternal Grandfather     Cancer Paternal Grandmother     No Known Problems Paternal Grandfather       No Known Allergies   Prior to Admission medications    Not on File     REVIEW OF SYSTEMS:     []  Unable to obtain reliable ROS due to  [] mental status  [] sedated   [] intubated   [x] Total of 12 systems reviewed as follows:  Constitutional: negative fever, negative chills, negative weight loss  Eyes:   negative visual changes  ENT:   negative sore throat, tongue or lip swelling  Respiratory:  negative cough, negative dyspnea  Cards:  negative for palpitations, lower extremity edema  GI:   negative for nausea, vomiting, diarrhea, and abdominal pain  :  negative for frequency, dysuria  Integument:  negative for rash and pruritus  Heme:  negative for easy bruising and gum/nose bleeding  Musculoskel: negative for myalgias,  back pain and muscle weakness  Neuro:  negative for headaches, dizziness, vertigo  Psych:  negative for feelings of anxiety, depression   Travel?: none    Objective:   VITALS:    Visit Vitals  BP (!) 134/91   Pulse (!) 158   Temp 99.3 °F (37.4 °C)   Resp 22   Ht 5' 4\" (1.626 m)   Wt 116.8 kg (257 lb 8 oz)   SpO2 97%   BMI 44.20 kg/m²     PHYSICAL EXAM:  Gen:  []  WD []  WN  [] cachectic []  thin [x]  obese []  disheveled             []  ill apearing  []   Critical  []   Chronic    [x]  No acute distress    HEENT:   [x] NC/AT/PERRL    [] pink conjunctivae      [] pale conjunctivae                  PERRL  [] yes  [] no      [] moist mucosa    [] dry mucosa    hearing intact to voice [x] yes  [] No                 NECK:   supple [] yes  [] no        masses [] yes  [] No               thyroid  []  non tender  []  tender    RESP:   [x] CTA bilaterally/no wheezing/rhonchi/rales/crackles    [] rhonchi bilaterally - no dullness  [] wheezing   [] rhonchi   [] crackles     use of accessory muscles [] yes [x] no    CARD:   [x]  regular rate and rhythm/No murmurs/rubs/gallops    murmur  [] yes () [] no      Rubs  [] yes  [] no       Gallops [] yes  [] no    Rate []  regular  []  irregular        carotid bruits  [] Right  []  Left                 LE edema [] yes  [x] no           JVP  []  yes   []  no    ABD:    [x] soft/non distended/non tender/+bowel sounds/no HSM    []  Rigid    tenderness [] yes [] no   Liver enlargement  []  yes []  no                Spleen enlargement  []  yes []  no     distended []  yes [] no     bowel sound  [] hypoactive   [] hyperactive    LYMPH:    Neck []  yes []  no       Axillae []  yes []  no    SKIN:   Rashes []  yes   [x]  no    Ulcers []  yes   []  no               [] tight to palpitation    skin turgor [x]  good  [] poor  [] decreased               Cyanosis/clubbing []  yes []  no    NEUR:   [x] cranial nerves II-XII grossly intact       [] Cranial nerves deficit                 []  facial droop    []  slurred speech   [] aphasic     [] Strength normal     []  weakness  []  LUE  []   RUE/ []  LLE  []   RLE    follows commands  [x]  yes []  no           PSYCH:   insight [] poor [x] good   Alert and Oriented to  [x] person  [x] place  [x]  time                    [] depressed [] anxious [] agitated  [] lethargic [] stuporous  [] sedated     LAB DATA REVIEWED:    Recent Labs     11/13/22 0219 11/12/22  0504   WBC 9.6 9.2   HGB 11.6 13.1   HCT 35.2 40.1   * 103*     Recent Labs     11/13/22 0219 11/12/22  0504   * 136   K 3.3* 3.6    105   CO2 22 22   BUN 10 9   CREA 0.79 0.86   * 116*   CA 7.2* 7.5*     Recent Labs     11/13/22 0219 11/12/22  0504   ALT 12 16   AP 75 84   TBILI 0.6 0.7   ALB 1.3* 1.3*   GLOB 5.1* 5.5*     Recent Labs     11/12/22  0741   INR 1.1   PTP 11.2*   APTT 30.2      No results for input(s): FE, TIBC, PSAT, FERR in the last 72 hours. No results for input(s): PH, PCO2, PO2 in the last 72 hours. No results for input(s): CPK, CKMB in the last 72 hours.     No lab exists for component: TROPONINI  No results found for: [facility]    Procedures: see electronic medical records for all procedures/Xrays and details which were not copied into this note but were reviewed prior to creation of Plan.    ________________________________________________________________________       ___________________________________________________  Consulting Physician:  Rc Radford MD

## 2022-11-14 LAB
ALBUMIN SERPL ELPH-MCNC: 1.7 G/DL (ref 2.9–4.4)
ALBUMIN SERPL-MCNC: 1 G/DL (ref 3.5–5)
ALBUMIN/GLOB SERPL: 0.2 {RATIO} (ref 1.1–2.2)
ALBUMIN/GLOB SERPL: 0.4 {RATIO} (ref 0.7–1.7)
ALP SERPL-CCNC: 62 U/L (ref 45–117)
ALPHA1 GLOB SERPL ELPH-MCNC: 0.4 G/DL (ref 0–0.4)
ALPHA2 GLOB SERPL ELPH-MCNC: 1.4 G/DL (ref 0.4–1)
ALT SERPL-CCNC: 9 U/L (ref 12–78)
ANION GAP SERPL CALC-SCNC: 9 MMOL/L (ref 5–15)
ANTI-PLA2R: 2 RU/ML (ref 0–19.9)
APTT, 117012: 30.3 SEC (ref 22.9–30.2)
AST SERPL-CCNC: 16 U/L (ref 15–37)
B-GLOBULIN SERPL ELPH-MCNC: 0.6 G/DL (ref 0.7–1.3)
B2 GLYCOPROT1 IGG SER-ACNC: <9 GPI IGG UNITS (ref 0–20)
B2 GLYCOPROT1 IGM SER-ACNC: <9 GPI IGM UNITS (ref 0–32)
BILIRUB SERPL-MCNC: 0.4 MG/DL (ref 0.2–1)
BUN SERPL-MCNC: 9 MG/DL (ref 6–20)
BUN/CREAT SERPL: 11 (ref 12–20)
CALCIUM SERPL-MCNC: 7.4 MG/DL (ref 8.5–10.1)
CHLORIDE SERPL-SCNC: 104 MMOL/L (ref 97–108)
CO2 SERPL-SCNC: 22 MMOL/L (ref 21–32)
CREAT SERPL-MCNC: 0.84 MG/DL (ref 0.55–1.02)
ERYTHROCYTE [DISTWIDTH] IN BLOOD BY AUTOMATED COUNT: 12.8 % (ref 11.5–14.5)
GAMMA GLOB SERPL ELPH-MCNC: 1.4 G/DL (ref 0.4–1.8)
GLOBULIN SER CALC-MCNC: 3.8 G/DL (ref 2.2–3.9)
GLOBULIN SER CALC-MCNC: 5.2 G/DL (ref 2–4)
GLUCOSE SERPL-MCNC: 106 MG/DL (ref 65–100)
HBV SURFACE AB SER QL: NONREACTIVE
HBV SURFACE AB SER-ACNC: 8.63 MIU/ML
HBV SURFACE AG SER QL: <0.1 INDEX
HBV SURFACE AG SER QL: NEGATIVE
HCT VFR BLD AUTO: 31 % (ref 35–47)
HGB BLD-MCNC: 10.4 G/DL (ref 11.5–16)
INR, 115108: 1.1 (ref 0.9–1.2)
INTERPRETATION, 117893: ABNORMAL
KAPPA LC FREE SER-MCNC: 115.3 MG/L (ref 3.3–19.4)
KAPPA LC FREE/LAMBDA FREE SER: 1.6 {RATIO} (ref 0.26–1.65)
LAMBDA LC FREE SERPL-MCNC: 72 MG/L (ref 5.7–26.3)
M PROTEIN SERPL ELPH-MCNC: 0.2 G/DL
MCH RBC QN AUTO: 27.9 PG (ref 26–34)
MCHC RBC AUTO-ENTMCNC: 33.5 G/DL (ref 30–36.5)
MCV RBC AUTO: 83.1 FL (ref 80–99)
NRBC # BLD: 0 K/UL (ref 0–0.01)
NRBC BLD-RTO: 0 PER 100 WBC
PLATELET # BLD AUTO: 163 K/UL (ref 150–400)
PMV BLD AUTO: 13 FL (ref 8.9–12.9)
POTASSIUM SERPL-SCNC: 3.5 MMOL/L (ref 3.5–5.1)
PROT SERPL-MCNC: 5.5 G/DL (ref 6–8.5)
PROT SERPL-MCNC: 6.2 G/DL (ref 6.4–8.2)
PT, 117030: 11.2 SEC (ref 9.1–12)
RBC # BLD AUTO: 3.73 M/UL (ref 3.8–5.2)
SCREEN APTT: 39.5 SEC (ref 0–51.9)
SCREEN DRVVT: 42.6 SEC (ref 0–47)
SODIUM SERPL-SCNC: 135 MMOL/L (ref 136–145)
T3FREE SERPL-MCNC: 1.3 PG/ML (ref 2.2–4)
T4 FREE SERPL-MCNC: 1.1 NG/DL (ref 0.8–1.5)
WBC # BLD AUTO: 8.8 K/UL (ref 3.6–11)

## 2022-11-14 PROCEDURE — 74011000250 HC RX REV CODE- 250: Performed by: INTERNAL MEDICINE

## 2022-11-14 PROCEDURE — 80053 COMPREHEN METABOLIC PANEL: CPT

## 2022-11-14 PROCEDURE — 74011250637 HC RX REV CODE- 250/637: Performed by: INTERNAL MEDICINE

## 2022-11-14 PROCEDURE — 86335 IMMUNFIX E-PHORSIS/URINE/CSF: CPT

## 2022-11-14 PROCEDURE — 87340 HEPATITIS B SURFACE AG IA: CPT

## 2022-11-14 PROCEDURE — 74011250636 HC RX REV CODE- 250/636: Performed by: INTERNAL MEDICINE

## 2022-11-14 PROCEDURE — 84481 FREE ASSAY (FT-3): CPT

## 2022-11-14 PROCEDURE — 83521 IG LIGHT CHAINS FREE EACH: CPT

## 2022-11-14 PROCEDURE — 85027 COMPLETE CBC AUTOMATED: CPT

## 2022-11-14 PROCEDURE — 84439 ASSAY OF FREE THYROXINE: CPT

## 2022-11-14 PROCEDURE — 86706 HEP B SURFACE ANTIBODY: CPT

## 2022-11-14 PROCEDURE — 36415 COLL VENOUS BLD VENIPUNCTURE: CPT

## 2022-11-14 PROCEDURE — 65660000001 HC RM ICU INTERMED STEPDOWN

## 2022-11-14 RX ADMIN — SODIUM CHLORIDE, PRESERVATIVE FREE 10 ML: 5 INJECTION INTRAVENOUS at 18:16

## 2022-11-14 RX ADMIN — SODIUM CHLORIDE, PRESERVATIVE FREE 10 ML: 5 INJECTION INTRAVENOUS at 06:09

## 2022-11-14 RX ADMIN — SODIUM CHLORIDE, PRESERVATIVE FREE 10 ML: 5 INJECTION INTRAVENOUS at 20:44

## 2022-11-14 RX ADMIN — ACETAMINOPHEN 650 MG: 325 TABLET ORAL at 23:49

## 2022-11-14 RX ADMIN — MELATONIN 3 MG: at 23:52

## 2022-11-14 RX ADMIN — ENOXAPARIN SODIUM 120 MG: 150 INJECTION SUBCUTANEOUS at 09:10

## 2022-11-14 RX ADMIN — ENOXAPARIN SODIUM 120 MG: 150 INJECTION SUBCUTANEOUS at 20:43

## 2022-11-14 RX ADMIN — ACETAMINOPHEN 650 MG: 325 TABLET ORAL at 12:36

## 2022-11-14 RX ADMIN — ACETAMINOPHEN 650 MG: 325 TABLET ORAL at 18:16

## 2022-11-14 NOTE — PROGRESS NOTES
Hospitalist Progress Note    NAME: Salomon Page   :  2000   MRN:  [id number]            Subjective:     Chief Complaint / Reason for Physician Visit  Discussed with RN events overnight. Pt c/o right sided flank pain. Otherwise all questions answered. Review of Systems:  Symptom Y/N Comments  Symptom Y/N Comments   Fever/Chills n   Chest Pain n    Poor Appetite n   Edema n    Cough n   Abdominal Pain n    Sputum n   Joint Pain n    SOB/LUNA n   Pruritis/Rash n    Nausea/vomit n   Tolerating PT/OT     Diarrhea n   Tolerating Diet     Constipation n   Other       Could NOT obtain due to:      Objective:     VITALS:   Last 24hrs VS reviewed since prior progress note. Most recent are:  Patient Vitals for the past 24 hrs:   Temp Pulse Resp BP SpO2   22 1238 (!) 102.8 °F (39.3 °C) -- -- -- --   22 1031 100.4 °F (38 °C) (!) 128 16 (!) 148/89 98 %   22 0720 99.5 °F (37.5 °C) (!) 133 18 122/72 98 %   22 0435 100.2 °F (37.9 °C) -- -- -- --   22 0340 (!) 101.3 °F (38.5 °C) (!) 132 20 (!) 140/85 98 %   22 0030 100 °F (37.8 °C) -- -- -- --   22 2318 (!) 102.4 °F (39.1 °C) (!) 120 20 (!) 141/93 97 %   22 1945 98.5 °F (36.9 °C) (!) 125 18 (!) 154/110 97 %   22 1459 99.8 °F (37.7 °C) (!) 126 22 (!) 143/86 98 %         Intake/Output Summary (Last 24 hours) at 2022 1330  Last data filed at 2022 1258  Gross per 24 hour   Intake 360 ml   Output 400 ml   Net -40 ml          PHYSICAL EXAM:  General: WD, WN. Alert, cooperative, no acute distress    EENT:  EOMI. Anicteric sclerae. MMM  Resp:  CTA bilaterally, no wheezing or rales. No accessory muscle use  CV:  Regular  rhythm,  No edema  GI:  Soft, Non distended, Non tender. +Bowel sounds  Neurologic:  Alert and oriented X 3, normal speech,   Psych:   Good insight. Not anxious nor agitated  Skin:  No rashes.   No jaundice    Procedures: see electronic medical records for all procedures/Xrays and details which were not copied into this note but were reviewed prior to creation of Plan. LABS:  I reviewed today's most current labs and imaging studies. Pertinent labs include:  Recent Labs     11/14/22  0403 11/13/22  0219 11/12/22  0504   WBC 8.8 9.6 9.2   HGB 10.4* 11.6 13.1   HCT 31.0* 35.2 40.1    115* 103*       Recent Labs     11/14/22  0403 11/13/22  0219 11/12/22  0741 11/12/22  0504   * 135*  --  136   K 3.5 3.3*  --  3.6    105  --  105   CO2 22 22  --  22   * 101*  --  116*   BUN 9 10  --  9   CREA 0.84 0.79  --  0.86   CA 7.4* 7.2*  --  7.5*   ALB 1.0* 1.3*  --  1.3*   TBILI 0.4 0.6  --  0.7   ALT 9* 12  --  16   INR  --   --  1.1  --          Signed: Maggi Arthur MD        Reviewed most current lab test results and cultures  YES  Reviewed most current radiology test results   YES  Review and summation of old records today    NO  Reviewed patient's current orders and MAR    YES  PMH/SH reviewed - no change compared to H&P      Assessment / Plan:  Acute bilateral pulmonary emboli POA  Left renal vein thrombosis POA  Presents with pleuritic flank pain  LE dopplers-negative  HS troponin and pBNP-negative  COVID negative  Telemetry  Presence of left renal vein thrombosis argues for possible hypercoaguable state              Has hormonal implant for pregnancy prevention              > 300 mg protein in urine and serum albumin 1.3 raises issue of nephrotic syndrome                          Check spot urine protein/creat ratio  Consulted hematology and nephrology, expertise appreciated  Lovenox 1 mg/kg then transition to NOAC after renal bx  Hopefully discharge in 1-2 days?      SIRS POA , respiratory rate 38, temp 100.8  Acute bilateral patchy airspace disease POA questionable infection versus pulmonary infarction  Upper respiratory infection POA  Ruled out COVID-19 POA with fever, upper respiratory symptoms, new PEs, diarrhea  CT chest with bilateral PE, patchy bilateral airspace disease  Concern for underlying infection in addition to the pulmonary emboli              Fever could also be due to the blood clots  Rapid COVID-negative  Influenza A/B antigen testing negative  Respiratory PCR-negative  Procalcitonin 0.06 Argues against bacterial pneumonia  Ucx negative              Hold antibiotics     2.1 cm right thyroid nodule  Followed prior to admit, reports she had a biopsy that was benign several months ago  TSH 6.80, FT3 1.3, FT4 1.1  PCP follow up after acute illness     Cholelithiasis POA  Intermittent RUQ pain, currently non tender    40 or above Morbid obesity / Body mass index is 44.2 kg/m². Code status: Full  Prophylaxis: Lovenox  Recommended Disposition: Home w/Family     ________________________________________________________________________  Care Plan discussed with:    Comments   Patient x    Family      RN     Care Manager     Consultant                        Multidiciplinary team rounds were held today with , nursing, pharmacist and clinical coordinator. Patient's plan of care was discussed; medications were reviewed and discharge planning was addressed.      ________________________________________________________________________  Total NON critical care TIME:  35   Minutes    Total CRITICAL CARE TIME Spent:   Minutes non procedure based      Comments   >50% of visit spent in counseling and coordination of care     ________________________________________________________________________  Dread Egan MD

## 2022-11-14 NOTE — PROGRESS NOTES
11/13/22 2318   Vitals   Temp (!) 102.4 °F (39.1 °C)   Pulse (Heart Rate) (!) 120   Resp Rate 20   O2 Sat (%) 97 %   Level of Consciousness 0   BP (!) 141/93   MAP (Monitor) 107   MAP (Calculated) 109   Cardiac Rhythm Sinus Tachy   MEWS Score 5   Box Number [id number]   Electrodes Replaced No   Pain 1   Pain Scale 1 Numeric (0 - 10)   Pain Intensity 1 0   Patient Stated Pain Goal 0   Pain Reassessment 1 Yes   POSS Scale   Opioid Sedation Scale 1   Oxygen Therapy   O2 Device None (Room air)   Patient Observation   Repositioned Head of bed elevated (degrees)   Patient Turned Turns self   Ambulate Ambulate in room   Activity In bed;Resting quietly   Mode of Transportation Wheelchair   MEWS 5 r/t HR and temperature, PRN tylenol given for fever, charge RN aware

## 2022-11-14 NOTE — PROGRESS NOTES
Problem: Falls - Risk of  Goal: *Absence of Falls  Description: Document Saima Gamma Fall Risk and appropriate interventions in the flowsheet.   Outcome: Progressing Towards Goal  Note: Fall Risk Interventions:            Medication Interventions: Patient to call before getting OOB, Teach patient to arise slowly                   Problem: Pulmonary Embolism Care Plan (Adult)  Goal: *Improvement of existing pulmonary embolism  Outcome: Progressing Towards Goal  Goal: *Absence of bleeding  Outcome: Progressing Towards Goal  Goal: *Labs within defined limits  Outcome: Progressing Towards Goal     Problem: Patient Education: Go to Patient Education Activity  Goal: Patient/Family Education  Outcome: Progressing Towards Goal

## 2022-11-14 NOTE — PROGRESS NOTES
Problem: Falls - Risk of  Goal: *Absence of Falls  Description: Document Emelia Patino Fall Risk and appropriate interventions in the flowsheet.   Outcome: Progressing Towards Goal  Note: Fall Risk Interventions:            Medication Interventions: Patient to call before getting OOB, Teach patient to arise slowly                   Problem: Pulmonary Embolism Care Plan (Adult)  Goal: *Improvement of existing pulmonary embolism  Outcome: Progressing Towards Goal  Goal: *Absence of bleeding  Outcome: Progressing Towards Goal  Goal: *Labs within defined limits  Outcome: Progressing Towards Goal

## 2022-11-14 NOTE — PROGRESS NOTES
0700: Bedside shift report received from [address], [address]. Report consisted of SBAR, Kardex, MAR, Recent Results, intake/output, and cardiac rhythm. 1056: Hematology consult called to [facility]. Dr. Anson Haywood is rounding today. 1158: Pregnancy test complete. Resulted negative. 1238: tylenol given for temp of 102.8    adEnd of Shift Note    Bedside shift change report given to [address] [place] [address], RN (oncoming nurse) by Terrence Coleman RN (offgoing nurse). Report included the following information SBAR, Kardex, Intake/Output, MAR, Recent Results, and Cardiac Rhythm sinus rhythm    Shift worked:  3031-7205     Shift summary and any significant changes:    Patient had another fever this afternoon. Tylenol given. 24HR urine sample started at 1600. Concerns for physician to address:      Zone phone for oncoming shift:          Activity:  Activity Level: Up ad leticia  Number times ambulated in hallways past shift: 0  Number of times OOB to chair past shift: 2    Cardiac:   Cardiac Monitoring: Yes      Cardiac Rhythm: Sinus Tachy    Access:  Current line(s): PIV     Genitourinary:   Urinary status: voiding    Respiratory:   O2 Device: None (Room air)  Chronic home O2 use?: NO  Incentive spirometer at bedside: NO       GI:  Last Bowel Movement Date: 11/13/22  Current diet:  ADULT DIET Regular  Passing flatus: YES  Tolerating current diet: YES       Pain Management:   Patient states pain is manageable on current regimen: YES    Skin:  Gaurav Score: 21  Interventions: increase time out of bed, PT/OT consult, and internal/external urinary devices    Patient Safety:  Fall Score:  Total Score: 1  Interventions: bed/chair alarm, assistive device (walker, cane, etc), gripper socks, pt to call before getting OOB, and stay with me (per policy)       Length of Stay:  Expected LOS: - - -  Actual LOS: 2      Terrence Coleman RN

## 2022-11-14 NOTE — PROGRESS NOTES
adEnd of Shift Note    Bedside shift change report given to ITALO Rodas (oncoming nurse) by Nereida Duane, RN (offgoing nurse). Report included the following information SBAR, Kardex, ED Summary, Intake/Output, MAR, and Recent Results    Shift worked:  night     Shift summary and any significant changes:     Pt spiking fevers overnight, PRN tylenol given     Pt experiencing increased anxiety overnight, atarax added PRN      Concerns for physician to address:  Continued fevers     Zone phone for oncoming shift:   xxx       Activity:  Activity Level: Up ad leticia  Number times ambulated in hallways past shift: 0  Number of times OOB to chair past shift: 0    Cardiac:   Cardiac Monitoring: Yes      Cardiac Rhythm: Sinus Tachy    Access:  Current line(s): PIV     Genitourinary:   Urinary status: voiding    Respiratory:   O2 Device: None (Room air)  Chronic home O2 use?: NO  Incentive spirometer at bedside: NO       GI:  Last Bowel Movement Date: 11/13/22  Current diet:  ADULT DIET Regular  Passing flatus: YES  Tolerating current diet: YES       Pain Management:   Patient states pain is manageable on current regimen: YES    Skin:  Gaurav Score: 21  Interventions: increase time out of bed    Patient Safety:  Fall Score:  Total Score: 1  Interventions: pt to call before getting OOB       Length of Stay:  Expected LOS: - - -  Actual LOS: 2      Nereida Duane, RN

## 2022-11-14 NOTE — PROGRESS NOTES
11/14/22 0340   Vitals   Temp (!) 101.3 °F (38.5 °C)   Temp Source Oral   Pulse (Heart Rate) (!) 132   Resp Rate 20   O2 Sat (%) 98 %   Level of Consciousness 0   BP (!) 140/85   MAP (Monitor) 100   MAP (Calculated) 103   BP 1 Location Left upper arm   BP 1 Method Automatic   BP Patient Position At rest   Cardiac Rhythm Sinus Tachy   MEWS Score 6   Pain 1   Pain Scale 1 Numeric (0 - 10)   Pain Intensity 1 0   Patient Stated Pain Goal 0   Pain Reassessment 1 Yes   POSS Scale   Opioid Sedation Scale 1   Oxygen Therapy   O2 Device None (Room air)   MEWS 6 r/t HR, BP, and temperature, ice packs applied, will reassess, charge RN aware

## 2022-11-14 NOTE — PROGRESS NOTES
Progress Note    NAME: Haydee Hansen   :  2000   MRN:  [id number]     Date/Time:  22     Assessment :    Plan:  Nephrotic syndrome  PE  Left renal vein thrombus  HTN  Obesity  Fever, tachycardia  HTN  Mild hypokalemia, hyponatremia  Mild thrombocytopenia Albumin 1.0 upc 5.6 grams, creatinine 0.8    Neg urine culture    S/p amoxicillin    HCV neg ; neg strept in Oct '22    Pending broad serologic workup:? Membronous nephropathy  MANUEL, myeloma, ANCA, anti-pla2r, HBV, HIV, APLS     Will need a kidney biopsy -- I have spoken with CT--they can do the kidney ct guided biopsy on wed. Plan would be to give lovenox in AM and hold lovenox Wed am.      I am getting heme to see given her hypercoaguable state-rvt/pulm emboli/enlarged spleen. Note sister with NHL    Hold on IV steroid for now given fevers--Fever from RVT? RSV (-), urine/blood CX (-)      CTA/CT:  1. Acute bilateral lower lobe pulmonary emboli. 2. Patchy bilateral airspace disease and small to moderate left pleural  effusion. Mildly enlarged thoracic lymph nodes. 3. Left renal vein thrombus, with slight renal enlargement and perinephric  stranding as above. 4. Splenomegaly. No definite splenic vein thrombosis. 5. Pericholecystic fluid. Possible cholelithiasis/sludge. 6. 2.1 cm right thyroid nodule. Recommend outpatient ultrasound, possibly  warranting fine-needle aspiration.     Pt has seen  for her [facility]    DW pt on rounds       Subjective:   CHIEF COMPLAINT:  anxiety over night      Past Medical History:   Diagnosis Date    Anxiety and depression       Past Surgical History:   Procedure Laterality Date    HX WISDOM TEETH EXTRACTION       Social History     Tobacco Use    Smoking status: Never    Smokeless tobacco: Never   Substance Use Topics    Alcohol use: Yes     Comment: rarely      Family History   Problem Relation Age of Onset    No Known Problems Mother     No Known Problems Father     Cancer Sister         hodgkins lymphoma    No Known Problems Brother     Lung Cancer Maternal Grandmother     Diabetes Maternal Grandmother     Hypertension Maternal Grandmother     No Known Problems Maternal Grandfather     Cancer Paternal Grandmother     No Known Problems Paternal Grandfather       No Known Allergies   Prior to Admission medications    Not on File     REVIEW OF SYSTEMS:    Sister with NHL  No n/v/cp/sob    Objective:   VITALS:    Visit Vitals  /72 (BP 1 Location: Left upper arm, BP Patient Position: At rest)   Pulse (!) 133   Temp 99.5 °F (37.5 °C)   Resp 18   Ht 5' 4\" (1.626 m)   Wt 116.8 kg (257 lb 8 oz)   SpO2 98%   BMI 44.20 kg/m²     PHYSICAL EXAM:  Gen:  []  WD []  WN  [] cachectic []  thin [x]  obese []  disheveled             []  ill apearing  []   Critical  []   Chronic    [x]  No acute distress    HEENT:   [x] NC/AT/PERRL    [] pink conjunctivae      [] pale conjunctivae                  PERRL  [] yes  [] no      [] moist mucosa    [] dry mucosa    hearing intact to voice [x] yes  [] No                 NECK:   supple [] yes  [] no        masses [] yes  [] No               thyroid  []  non tender  []  tender    RESP:   [x] CTA bilaterally/no wheezing/rhonchi/rales/crackles    [] rhonchi bilaterally - no dullness  [] wheezing   [] rhonchi   [] crackles     use of accessory muscles [] yes [x] no    CARD:   [x]  regular rate and rhythm/No murmurs/rubs/gallops    murmur  [] yes ()  [] no      Rubs  [] yes  [] no       Gallops [] yes  [] no    Rate []  regular  []  irregular        carotid bruits  [] Right  []  Left                 LE edema [] yes  [x] no           JVP  []  yes   []  no    ABD:    [x] soft/non distended/non tender/+bowel sounds/no HSM    []  Rigid    tenderness [] yes [] no   Liver enlargement  []  yes []  no                Spleen enlargement  []  yes []  no     distended []  yes [] no     bowel sound  [] hypoactive   [] hyperactive    LYMPH:    Neck []  yes []  no       Axillae []  yes [] no    SKIN:   Rashes []  yes   [x]  no    Ulcers []  yes   []  no               [] tight to palpitation    skin turgor [x]  good  [] poor  [] decreased               Cyanosis/clubbing []  yes []  no    NEUR:   [x] cranial nerves II-XII grossly intact       [] Cranial nerves deficit                 []  facial droop    []  slurred speech   [] aphasic     [] Strength normal     []  weakness  []  LUE  []   RUE/ []  LLE  []   RLE    follows commands  [x]  yes []  no           PSYCH:   insight [] poor [x] good   Alert and Oriented to  [x] person  [x] place  [x]  time                    [] depressed [] anxious [] agitated  [] lethargic [] stuporous  [] sedated     LAB DATA REVIEWED:    Recent Labs     11/14/22 0403 11/13/22 0219   WBC 8.8 9.6   HGB 10.4* 11.6   HCT 31.0* 35.2    115*       Recent Labs     11/14/22 0403 11/13/22 0219 11/12/22  0504   * 135* 136   K 3.5 3.3* 3.6    105 105   CO2 22 22 22   BUN 9 10 9   CREA 0.84 0.79 0.86   * 101* 116*   CA 7.4* 7.2* 7.5*       Recent Labs     11/14/22 0403 11/13/22 0219 11/12/22  0504   ALT 9* 12 16   AP 62 75 84   TBILI 0.4 0.6 0.7   ALB 1.0* 1.3* 1.3*   GLOB 5.2* 5.1* 5.5*       Recent Labs     11/12/22  0741   INR 1.1   PTP 11.2*   APTT 30.2        No results for input(s): FE, TIBC, PSAT, FERR in the last 72 hours. No results for input(s): PH, PCO2, PO2 in the last 72 hours. No results for input(s): CPK, CKMB in the last 72 hours.     No lab exists for component: TROPONINI  No results found for: GLUCPOC    Procedures: see electronic medical records for all procedures/Xrays and details which were not copied into this note but were reviewed prior to creation of Plan.    ________________________________________________________________________       ___________________________________________________  Consulting Physician: Darus Ohm, MD

## 2022-11-14 NOTE — ADT AUTH CERT NOTES
REF# [id number]    [address]     FACILITY NPI :[id number]  FACILITY TAX ID : [id number]     [address]  [facility] 2 CARDIOPULMONARY CARE   [facility] [id number] [facility] HOSPITAL: [phone]  FX: [phone]      Patient Name :Martha Horton   : 2000 (22 yrs)  MRN : [id number]     Patient Mailing Address [address] [place] [address] [47] , [address]                                                               . Insurance Plan Payor: BLUE CROSS / Plan: [facility] PPO / Product Type: PPO /      Primary Coverage Subscriber ID : [id number]        Current Patient Class : INPATIENT  Admit Date : 2022     REQUESTED LEVEL OF CARE: INPATIENT [101]                                                           Diagnosis : Bilateral pulmonary embolism ([address]                          ICD10 Code : Bilateral pulmonary embolism ([address] [I26.99]     Current Room and Bed      Admitting and Attending Info:  Admitting Provider : Sandra Mackay MD   NPI: [id number]  Admitting Provider Phone.  [phone]  Admitting Provider Address: [address] [place] [address] Way     Attending Provider Beverley Cain MD   [id number]  Attending Provider Address:  [address] [facility]                                                   [address]     Attending Provider Phone: Ciaran jonas phone: [phone]        Utilization Reviews       Pulmonary Embolism - Care Day 2 (2022) by Manjeet Shirley       Review Entered Review Status   2022 1108 Completed      Criteria Review      Care Day: 2 Care Date: 2022 Level of Care: Step Down    Guideline Day 2    Level Of Care    (X) ICU or floor    2022 11:08:23 EST by Rory Hsu    Clinical Status    ( ) * Hemodynamic stability    11/14/2022 11:08:23 EST by Alvino Yeung      HR sustained tachycardic with -158, /110. Febrile-fever actually worsened with Tmax 102.4. 116.8kg    (X) * Oxygenation stable or improved    11/14/2022 11:08:23 EST by Yadi Rodas      O2 sat 97% on RA    (X) * Dyspnea absent or improved    11/14/2022 11:08:23 EST by Alvino Yeung      improved    ( ) * Tachypnea absent or improved    11/14/2022 11:08:23 EST by Alvino Yeung      HR 20-24, but improved from yesterday    ( ) * PTT in therapeutic range or not indicated    Activity    (X) * Limited ambulation    11/14/2022 11:08:23 EST by Alvino Yeung      act as rosalba with asst    Routes    (X) Oral hydration    11/14/2022 11:08:23 EST by Alvino Yeung      po diet    (X) Oral medications    11/14/2022 11:08:23 EST by Alvino Yeung      Kcl 20meq po x1, atarax 25mg po tid prn x1, melatonin 3mg po qhs prn x1, tylenol 650mg po q6H PRN x4 (MAX)    (X) Usual diet    11/14/2022 11:08:23 EST by Alvino Yeung      Regular diet    Interventions    ( ) Platelet count    54/06/0450 11:08:23 EST by Alvino Yeung      OTHER LABS: plt 115. Na 135, K 3.3, gluc 101, zhang 7.2, alb 1.3, glob 5.1. TSH 6.80.     (X) Pulse oximetry    11/14/2022 11:08:23 EST by Alvino Yeung      cont pulse ox    (X) Possible cardiac monitoring    11/14/2022 11:08:23 EST by Alvino Yeung      cont tele    Medications    (X) Anticoagulants    11/14/2022 11:08:23 EST by Alvino Yeung      Lovenox 120mg sq q12H    * Milestone   Additional Notes   Assessment / Plan:   Acute bilateral pulmonary emboli POA   Left renal vein thrombosis POA   Presents with pleuritic flank pain   LE dopplers-negative   HS troponin and pBNP-negative   COVID negative   Telemetry   Presence of left renal vein thrombosis argues for possible hypercoaguable state               Has hormonal implant for pregnancy prevention               > 300 mg protein in urine and serum albumin 1.3 raises issue of nephrotic syndrome                           Check spot urine protein/creat ratio               If above negative, will consult hematology   Lovenox 1 mg/kg then transition to NOAC after renal bx   Hopefully discharge in 1-2 days       SIRS POA , respiratory rate 38, temp 100.8   Acute bilateral patchy airspace disease POA questionable infection versus pulmonary infarction   Upper respiratory infection POA   Ruled out COVID-19 POA with fever, upper respiratory symptoms, new PEs, diarrhea   CT chest with bilateral PE, patchy bilateral airspace disease   Concern for underlying infection in addition to the pulmonary emboli               Fever could also be due to the blood clots   Rapid COVID-negative   Influenza A/B antigen testing negative   Respiratory PCR-negative   Procalcitonin 0.06 Argues against bacterial pneumonia   Ucx negative               Hold antibiotics       2.1 cm right thyroid nodule   Followed prior to admit, reports she had a biopsy that was benign several months ago   Check TSH   Follow up T3, T4   PCP follow up       Cholelithiasis POA   Intermittent RUQ pain, currently non tender       40 or above Morbid obesity / Body mass index is 44.2 kg/m². Pulmonary Embolism - Care Day 1 (11/12/2022) by Maurice Anthony       Review Entered Review Status   11/14/2022 1059 Completed      Criteria Review      Care Day: 1 Care Date: 11/12/2022 Level of Care: Step Down    Guideline Day 1    Level Of Care    (X) ICU, intermediate care unit, or floor    11/14/2022 10:59:22 EST by Analia Rodas    Clinical Status    (X) * Clinical Indications met    11/14/2022 10:59:22 EST by Jerome Mi      22yr old female w/PMH as doc to ED c/o worsening left flank pain, SOB for last 4 days despite taking urgent care's Rx doxycycline and robaxin.  Sust tachycardia w/-142, febrile w/temp max 100.8, 156/104, RR 38, RA sat 94%. 117.1kg    (X) Possible dyspnea, pleuritic pain, hypoxia    11/14/2022 10:59:22 EST by Yadi Burris      dyspnea and tachypnea as doc    Activity    (X) Possible limited ambulation    11/14/2022 10:59:22 EST by Karen Rdz      act as rosalba with asst    Routes    (X) Oral hydration    11/14/2022 10:59:22 EST by Karen Rdz      NS 1000cc IVF bolus x1 in ED and po diet    ( ) Oral medications    11/14/2022 10:59:22 EST by Karen Rzd      Rocephin 1g IV x1 in ED, zithromax 500mg IV x1 in ED, toradol 30mg IV x1 in ED, tylenol 1000mg po x1 in ED and 650mg po q6H PRN x1    (X) Diet as tolerated    11/14/2022 10:59:22 EST by Karen Rdz      Regular diet    Interventions    (X) Possible CT angiogram    11/14/2022 10:59:22 EST by Karen Rdz      CTA chest: acute bilateral LL pulm emboli. Patchy bilat airsapace disease adn sm to mod left pleural effusion. Mildly enlarged thoracic LN. Lt renal vein thrombosis w/slt renal enlargement and perinephritic stranding. Splenomegaly. Poss chiquis sludge    (X) Possible noninvasive peripheral vascular assessment    11/14/2022 10:59:22 EST by Karen Rdz      PVL BLE: no DVT    ( ) ABG, ECG    11/14/2022 10:59:22 EST by Karen Rdz      OTHER LABS: Gluc 116, zhang 7.5, alb 1.3, glob 5.5. plt 103. UA: cloudy, >300prot, trace ketone, large bili, sm leuko, large epi, 3+bact. Urine randome prot 1378. bld and urine cx pend.  COVID and resp/flu cx NEG.    (X) Pulse oximetry    11/14/2022 10:59:22 EST by Karen Rdz      cont pulse ox    (X) Possible cardiac monitoring    11/14/2022 10:59:22 EST by Karen Rdz      cont tele    Medications    (X) Initiate anticoagulants    11/14/2022 10:59:22 EST by Karen Rdz      Lovenox 1mg/kg sq x1 in ED and 120mg sq q12H    * Milestone   Additional Notes   Assessment/Plan:       Acute bilateral pulmonary emboli POA   Left renal vein thrombosis POA   Presents with pleuritic flank pain   Lovenox 1 mg/kg then transition to NOAC   Check LE dopplers   Check HS troponin and pBNP   Telemetry   Presence of left renal vein thrombosis argues for possible hypercoaguable state               Has hormonal implant for pregnancy prevention               > 300 mg protein in urine and serum albumin 1.3 raises issue of nephrotic syndrome                           Check spot urine protein/creat ratio               Rule out COVID-19                If above negative, will consult hematology   Hopefully discharge in 1-2 days       SIRS POA , respiratory rate 38, temp 100.8   Acute bilateral patchy airspace disease POA questionable infection versus pulmonary infarction   Upper respiratory infection POA   Rule out COVID-19 POA with fever, upper respiratory symptoms, new PEs, diarrhea   CT chest with bilateral PE, patchy bilateral airspace disease   Concern for underlying infection in addition to the pulmonary emboli               Fever could also be due to the blood clots   COVID-19 associated with VTE   Rapid COVID-negative   Influenza A/B antigen testing negative   Check respiratory PCR   Procalcitonin 0.06 Argues against bacterial pneumonia               Hold antibiotics       2.1 cm right thyroid nodule   Followed prior to admit, reports she had a biopsy that was benign several months ago   Check TSH   PCP follow up       Cholelithiasis POA   Intermittent RUQ pain, currently non tender       Morbid Obesity POA Body mass index is 44.31 kg/m².       Physical Exam:    General:          Alert, cooperative in no distress      HEENT:           Normocephalic, atraumatic    PERRL, EOMI  Sclera no icterus                           Nasal mucosa without masses or discharge  Hearing intact to voice                           Oropharynx without erythema or exudate  No thrush  [place] MM   Neck:               No meningismus, trachea midline, no carotid bruits                            Thyroid not enlarged, no nodules or tenderness   Lungs:             Clear to auscultation bilaterally. No wheezing or rales                           No accessory muscle use or retractions. Heart:              Regular rate and rhythm,  no murmur or gallop. No LE edema                           Dorsal pedis, Posterior tibial and radial pulses 2+ and symmetric   Abdomen:        Soft, non-tender. Not distended. Bowel sounds normal.                            No masses, No Hepatosplenomegaly, No Rebound or guarding   Lymph nodes: No cervical or inguinal RICHIE   Musculoskeletal:  No Joint swelling, erythema, warmth. No Cyanosis or clubbing   Skin:                 No rashes  No Ulcers. Not Jaundiced   No nodules or thickening                           Capillary refill normal   Neurologic:      Alert and oriented X 3, follows commands                            Cranial nerves 2 to 12 intact                           Symmetric motor strength bilaterally           Pulmonary Embolism - Clinical Indications for Admission to Inpatient Care by Abbi Ceja       Review Entered Review Status   11/14/2022 1050 Completed      Criteria Review      Clinical Indications for Admission to Inpatient Care    Most Recent : Kinsey Waters Most Recent Date: 11/14/2022 10:50:10 EST    (X) Admission is indicated for  1 or more  of the following  (1) (2) (3) (4) (5) (6) (7) (8):       (X) Vital sign abnormality (9)       11/14/2022 10:50:10 EST by [employer] sustained 109-142    Notes:    11/14/2022 10:50:10 EST by Kinsey Waters    Subject: Additional Clinical Information      * 18y old female with PMH recent left ear infection s/p po amoxacillin to urgent care for onset left flank pain 4 days ago w/sinus congestion. Pt placed on doxycycline and robaxin. To ED today for worsening ltf lower flank/thoracic area pain at 6/10 and mild LUNA with loose stools.  Imaging c/w bilat LL pulm emboli, patchy bilat airspace disease/left pleural effusions, splenomegaly. Pt with very high sustained HR and febrile. Admit         H&P Notes     H&P by Duke Perera MD at 22 1104 documented on ED to Hosp-Admission (Current) from 2022 in [facility] 2 CARDIOPULMONARY CARE    Author: Duke Perera MD Author Type: Physician Filed: 22 1045   Note Status: Addendum Solange Jones: Cosign Not Required Date of Service: 22   : Duke Perera MD (Physician)       Prior Versions: 1.  Duke Perera MD (Physician) at 22 1111 - Signed   Expand All Collapse All           Hospitalist Admission Note     NAME:            Aisha Dias   :               2000   MRN:               [id number]      Date of admit: 2022     PCP:    Corazon Martin NP     Assessment/Plan:      Acute bilateral pulmonary emboli POA  Left renal vein thrombosis POA  Presents with pleuritic flank pain  Lovenox 1 mg/kg then transition to NOAC  Check LE dopplers  Check HS troponin and pBNP  Telemetry  Presence of left renal vein thrombosis argues for possible hypercoaguable state              Has hormonal implant for pregnancy prevention              > 300 mg protein in urine and serum albumin 1.3 raises issue of nephrotic syndrome                          Check spot urine protein/creat ratio              Rule out COVID-19               If above negative, will consult hematology  Hopefully discharge in 1-2 days     SIRS POA , respiratory rate 38, temp 100.8  Acute bilateral patchy airspace disease POA questionable infection versus pulmonary infarction  Upper respiratory infection POA  Rule out COVID-19 POA with fever, upper respiratory symptoms, new PEs, diarrhea  CT chest with bilateral PE, patchy bilateral airspace disease  Concern for underlying infection in addition to the pulmonary emboli              Fever could also be due to the blood clots  COVID-19 associated with VTE  Rapid COVID-negative  Influenza A/B antigen testing negative  Check respiratory PCR  Procalcitonin 0.06 Argues against bacterial pneumonia              Hold antibiotics     2.1 cm right thyroid nodule  Followed prior to admit, reports she had a biopsy that was benign several months ago  Check TSH  PCP follow up     Cholelithiasis POA  Intermittent RUQ pain, currently non tender     Morbid Obesity POA Body mass index is 44.31 kg/m². Given the patient's current clinical presentation, I have a high level of concern for decompensation if discharged from the emergency department. My assessment of this patient's clinical condition and my plan of care is as noted above. DVT prophylaxis with lovenox 1 mg/kg     Code status: full code  NOK:     History      CHIEF COMPLAINT:      HISTORY OF PRESENT ILLNESS:     20 y.o. female      Unvaccinated against COVID-19     Recent left ear infection several weeks ago  Took course of amoxacillin     Onset of left flank pain x 4 days  + Sinus congestion  Patient states she was seen a few days ago at an urgent care and diagnosed with muscle strain of the back. Diagnosed with sinus infection, started on doxycycline. Prescription for Robaxin as a muscle relaxer. Increasing left flank lower flank/thoracic area, sharp, 6 out of 10 in intensity since then    Worse with certain positions as well as taking a deep breath. Mild LUNA  Cough but no hemoptysyis  No Fevers, Headache, SSCP, abdominal pain, nausea, vomiting, melena, BRBPR  + 4 loose stools today     Came to ED due to the worsening left back pain     ED  , RR to 35  Temp 100.8  WBC 9.2  PLTs 103,000  PT INR 1.0, PTT 30  Serum albumin 1.3, creatinine 0.86  UA with > 300 mg protein, nitrite neg, 10-20 WBC, 10-20 RBC, 3+ bacteria, many squames  CTA chest and CT abdomen/pelvis IMPRESSION  1. Acute bilateral lower lobe pulmonary emboli. 2. Patchy bilateral airspace disease and small to moderate left pleural  effusion. Mildly enlarged thoracic lymph nodes. 3. Left renal vein thrombus, with slight renal enlargement and perinephric  stranding as above. 4. Splenomegaly. No definite splenic vein thrombosis. 5. Pericholecystic fluid. Possible cholelithiasis/sludge. 6. 2.1 cm right thyroid nodule.    Rapid COVID-19 negative  Lovenox 1 mg/kg  We were called to admit the patient             Past Medical History:   Diagnosis Date    Anxiety and depression                 Past Surgical History:   Procedure Laterality Date    HX WISDOM TEETH EXTRACTION             Social History            Tobacco Use    Smoking status: Never    Smokeless tobacco: Never   Substance Use Topics    Alcohol use: Yes       Comment: rarely               Family History   Problem Relation Age of Onset    No Known Problems Mother      No Known Problems Father      Cancer Sister           hodgkins lymphoma    No Known Problems Brother      Lung Cancer Maternal Grandmother      Diabetes Maternal Grandmother      Hypertension Maternal Grandmother      No Known Problems Maternal Grandfather      Cancer Paternal Grandmother      No Known Problems Paternal Grandfather      No DVT or PE     No Known Allergies      Prior to Admission medications    Not on File         Review of symptoms:                                      POSITIVE= Bold  Negative = not bold  General:                     fever, chills, sweats, generalized weakness, weight loss                                      loss of appetite   Eyes:                           blurred vision, eye pain, double vision  ENT:                            [place], sore throat, trouble swallowing  Respiratory:               cough, sputum, SOB  Cardiology:                chest pain left posterior, pleuritic, orthopnea, PND, edema  Gastrointestinal:       abdominal pain , N/V, diarrhea, constipation, melena or BRBPR  Genitourinary:           Urgency, dysuria, hematuria  Muskuloskeletal :      Joint redness, swelling or acute joint pain, myalgias  Hematology:              easy bruising, nose or gum bleeding  Dermatological:         rash, ulceration  Endocrine:                 Polyuria or polydipsia, heat or hold intolerance  Neurological:             Headache, focal motor or sensory changes                                      Speech difficulties, memory loss  Psychological:          depression, agitation        Objective:   VITALS:    Patient Vitals for the past 24 hrs:    Temp Pulse Resp BP SpO2   22 0809 98.1 °F (36.7 °C) -- -- -- --   22 0615 -- (!) 115 (!) 33 (!) 145/101 99 %   22 0600 -- (!) 118 (!) 38 (!) 156/104 99 %   22 0442 (!) 100.8 °F (38.2 °C) (!) 136 18 138/87 95 %         Temp (24hrs), Av.5 °F (37.5 °C), Min:98.1 °F (36.7 °C), Max:100.8 °F (38.2 °C)       O2 Device: None (Room air)         Wt Readings from Last 12 Encounters:   22 117.1 kg (258 lb 2.5 oz)   22 117.9 kg (260 lb)   22 121 kg (266 lb 12.8 oz)   19 108.1 kg (238 lb 6.4 oz)            PHYSICAL EXAM:      I had a face to face encounter and independently examined this patient on 22  as outlined below:     General:          Alert, cooperative in no distress     HEENT:           Normocephalic, atraumatic    PERRL,Sclera no icterus                          Nasal mucosa without masses or discharge  Hearing intact to voice                          Oropharynx without erythema or exudate  No thrush  [place] MM  Neck:               No meningismus, trachea midline                          Thyroid not enlarged  Lungs:             Crackles at bases bilaterally. No wheezing                          No accessory muscle use or retractions. Heart:              Regular rate and rhythm,  no murmur or gallop. No LE edema  Abdomen:        Soft, non-tender. Not distended.   Bowel sounds normal.                           No masses, No Hepatosplenomegaly, No Rebound or guarding  Lymph nodes: No cervical or inguinal RICHIE  Musculoskeletal:  No Joint swelling, erythema, warmth. No Cyanosis or clubbing  Skin:                 No rashes                           Not Jaundiced   No nodules or thickening  Neurologic:      Alert and oriented X 3, follows commands                           Cranial nerves 2 to 12 intact                          Symmetric motor strength bilaterally                   LAB DATA REVIEWED:    Recent Results         Recent Results (from the past 12 hour(s))   CBC WITH AUTOMATED DIFF     Collection Time: 11/12/22  5:04 AM   Result Value Ref Range     WBC 9.2 3.6 - 11.0 K/uL     RBC 4.69 3.80 - 5.20 M/uL     HGB 13.1 11.5 - 16.0 g/dL     HCT 40.1 35.0 - 47.0 %     MCV 85.5 80.0 - 99.0 FL     MCH 27.9 26.0 - 34.0 PG     MCHC 32.7 30.0 - 36.5 g/dL     RDW 12.9 11.5 - 14.5 %     PLATELET 700 (L) 277 - 400 K/uL     MPV ABNORMAL 8.9 - 12.9 FL     NRBC 0.0 0  WBC     ABSOLUTE NRBC 0.00 0.00 - 0.01 K/uL     NEUTROPHILS 80 (H) 32 - 75 %     LYMPHOCYTES 13 12 - 49 %     MONOCYTES 7 5 - 13 %     EOSINOPHILS 0 0 - 7 %     BASOPHILS 0 0 - 1 %     IMMATURE GRANULOCYTES 0 0.0 - 0.5 %     ABS. NEUTROPHILS 7.3 1.8 - 8.0 K/UL     ABS. LYMPHOCYTES 1.2 0.8 - 3.5 K/UL     ABS. MONOCYTES 0.6 0.0 - 1.0 K/UL     ABS. EOSINOPHILS 0.0 0.0 - 0.4 K/UL     ABS. BASOPHILS 0.0 0.0 - 0.1 K/UL     ABS. IMM. GRANS. 0.0 0.00 - 0.04 K/UL     DF AUTOMATED     METABOLIC PANEL, COMPREHENSIVE     Collection Time: 11/12/22  5:04 AM   Result Value Ref Range     Sodium 136 136 - 145 mmol/L     Potassium 3.6 3.5 - 5.1 mmol/L     Chloride 105 97 - 108 mmol/L     CO2 22 21 - 32 mmol/L     Anion gap 9 5 - 15 mmol/L     Glucose 116 (H) 65 - 100 mg/dL     BUN 9 6 - 20 MG/DL     Creatinine 0.86 0.55 - 1.02 MG/DL     BUN/Creatinine ratio 10 (L) 12 - 20       eGFR >60 >60 ml/min/1.73m2     Calcium 7.5 (L) 8.5 - 10.1 MG/DL     Bilirubin, total 0.7 0.2 - 1.0 MG/DL     ALT (SGPT) 16 12 - 78 U/L     AST (SGOT) 28 15 - 37 U/L     Alk.  phosphatase 84 45 - 117 U/L     Protein, total 6.8 6.4 - 8.2 g/dL     Albumin 1.3 (L) 3.5 - 5.0 g/dL     Globulin 5.5 (H) 2.0 - 4.0 g/dL     A-G Ratio 0.2 (L) 1.1 - 2.2     COVID-19 RAPID TEST     Collection Time: 11/12/22  5:04 AM   Result Value Ref Range     Specimen source Nasopharyngeal       COVID-19 rapid test Not detected NOTD     INFLUENZA A+B VIRAL AGS     Collection Time: 11/12/22  5:04 AM   Result Value Ref Range     Influenza A Antigen Negative NEG       Influenza B Antigen Negative NEG     URINALYSIS W/ REFLEX CULTURE     Collection Time: 11/12/22  5:04 AM     Specimen: Miscellaneous sample; Urine     Urine specimen   Result Value Ref Range     Color DARK YELLOW       Appearance CLOUDY (A) CLEAR       Specific gravity 1.020 1.003 - 1.030       pH (UA) 7.0 5.0 - 8.0       Protein >300 (A) NEG mg/dL     Glucose Negative NEG mg/dL     Ketone TRACE (A) NEG mg/dL     Blood LARGE (A) NEG       Urobilinogen 1.0 0.2 - 1.0 EU/dL     Nitrites Negative NEG       Leukocyte Esterase SMALL (A) NEG       WBC 10-20 0 - 4 /hpf     RBC 10-20 0 - 5 /hpf     Epithelial cells MANY (A) FEW /lpf     Bacteria 3+ (A) NEG /hpf     UA:UC IF INDICATED URINE CULTURE ORDERED (A) CNI     BILIRUBIN, CONFIRM     Collection Time: 11/12/22  5:04 AM   Result Value Ref Range     Bilirubin UA, confirm Negative NEG     CULTURE, BLOOD, PAIRED     Collection Time: 11/12/22  7:30 AM     Specimen: Blood   Result Value Ref Range     Special Requests: NO SPECIAL REQUESTS       Culture result: NO GROWTH AFTER 1 HOUR     PROTHROMBIN TIME + INR     Collection Time: 11/12/22  7:41 AM   Result Value Ref Range     INR 1.1 0.9 - 1.1       Prothrombin time 11.2 (H) 9.0 - 11.1 sec   PTT     Collection Time: 11/12/22  7:41 AM   Result Value Ref Range     aPTT 30.2 22.1 - 31.0 sec     aPTT, therapeutic range     58.0 - 77.0 SECS   PROCALCITONIN     Collection Time: 11/12/22  7:41 AM   Result Value Ref Range     Procalcitonin 0.06 ng/mL   POC LACTIC ACID     Collection Time: 11/12/22  7:53 AM   Result Value Ref Range     Lactic Acid (POC) 0.86 0.40 - 2.00 mmol/L               CT Results  (Last 48 hours)                    11/12/22 0723   CTA CHEST W OR W WO CONT Final result     Impression:       1. Acute bilateral lower lobe pulmonary emboli. 2. Patchy bilateral airspace disease and small to moderate left pleural   effusion. Mildly enlarged thoracic lymph nodes. 3. Left renal vein thrombus, with slight renal enlargement and perinephric   stranding as above. 4. Splenomegaly. No definite splenic vein thrombosis. 5. Pericholecystic fluid. Possible cholelithiasis/sludge. 6. 2.1 cm right thyroid nodule. Recommend outpatient ultrasound, possibly   warranting fine-needle aspiration. The findings were called to Dr. Yuly Palomino on 11/12/22 at [address] [place] [address] by myself. [phone]                    Narrative:   INDICATION: Tachycardia, tachypnea, left flank pain       COMPARISON: None       TECHNIQUE:  CTA imaging of the chest,  and CT imaging of the abdomen and pelvis   was performed after the uneventful intravenous administration of contrast   material.  Coronal and sagittal reconstructions were generated. 3D image   postprocessing was performed. CT dose reduction was achieved through use of a   standardized protocol tailored for this examination and automatic exposure   control for dose modulation. FINDINGS:       THYROID: 2.1 cm right thyroid nodule. Small left thyroid nodules. MEDIASTINUM/SOLANGE: Mildly enlarged bilateral axillary lymph nodes, 1.2-1.3 cm. Right paratracheal lymph nodes at the 1.1 cm. HEART/PERICARDIUM: Unremarkable. AORTA: No aneurysm. PULMONARY ARTERIES: Suboptimal contrast opacification of the pulmonary arterial   circuit, though there are bilateral lower lobe pulmonary emboli. LUNGS/PLEURA: Patchy airspace disease in the bilateral lower lobes and lingula. Small to moderate left pleural effusion. No pneumothorax. BONES: No destructive bone lesion. ADDITIONAL COMMENTS: N/A       LIVER: No mass or biliary dilatation. GALLBLADDER: Pericholecystic fluid. Possible stones/sludge within the   gallbladder. SPLEEN: Enlarged, 16.5 cm in craniocaudal dimension. No definite splenic vein   thrombus. PANCREAS: No mass or ductal dilatation. ADRENALS: No mass. KIDNEYS: Mild asymmetric enlargement of the left kidney relative to the right,   with mild left perinephric stranding. No hydronephrosis or nephrolithiasis. Subtle filling defects in the left renal vein   GI TRACT: No bowel obstruction or wall thickening   PERITONEUM: No free air or free fluid. APPENDIX: Unremarkable. Barnetta Valdez RETROPERITONEUM: No lymphadenopathy or aortic aneurysm. URINARY BLADDER: No mass or calculus. LYMPH NODES: Prominent bilateral external iliac chain and inguinal lymph nodes. REPRODUCTIVE ORGANS: Unremarkable. FREE FLUID: None. BONES: No destructive bone lesion. ADDITIONAL COMMENTS: N/A.            11/12/22 0723   CT ABD PELV W CONT Final result     Impression:       1. Acute bilateral lower lobe pulmonary emboli. 2. Patchy bilateral airspace disease and small to moderate left pleural   effusion. Mildly enlarged thoracic lymph nodes. 3. Left renal vein thrombus, with slight renal enlargement and perinephric   stranding as above. 4. Splenomegaly. No definite splenic vein thrombosis. 5. Pericholecystic fluid. Possible cholelithiasis/sludge. 6. 2.1 cm right thyroid nodule. Recommend outpatient ultrasound, possibly   warranting fine-needle aspiration. The findings were called to Dr. Prisca Cordon on 11/12/22 at [address] [place] [address] by myself. [phone]            Narrative:   INDICATION: Tachycardia, tachypnea, left flank pain       COMPARISON: None       TECHNIQUE:  CTA imaging of the chest,  and CT imaging of the abdomen and pelvis   was performed after the uneventful intravenous administration of contrast   material.  Coronal and sagittal reconstructions were generated.   3D image postprocessing was performed. CT dose reduction was achieved through use of a   standardized protocol tailored for this examination and automatic exposure   control for dose modulation. FINDINGS:       THYROID: 2.1 cm right thyroid nodule. Small left thyroid nodules. MEDIASTINUM/SOLANGE: Mildly enlarged bilateral axillary lymph nodes, 1.2-1.3 cm. Right paratracheal lymph nodes at the 1.1 cm. HEART/PERICARDIUM: Unremarkable. AORTA: No aneurysm. PULMONARY ARTERIES: Suboptimal contrast opacification of the pulmonary arterial   circuit, though there are bilateral lower lobe pulmonary emboli. LUNGS/PLEURA: Patchy airspace disease in the bilateral lower lobes and lingula. Small to moderate left pleural effusion. No pneumothorax. BONES: No destructive bone lesion. ADDITIONAL COMMENTS: N/A       LIVER: No mass or biliary dilatation. GALLBLADDER: Pericholecystic fluid. Possible stones/sludge within the   gallbladder. SPLEEN: Enlarged, 16.5 cm in craniocaudal dimension. No definite splenic vein   thrombus. PANCREAS: No mass or ductal dilatation. ADRENALS: No mass. KIDNEYS: Mild asymmetric enlargement of the left kidney relative to the right,   with mild left perinephric stranding. No hydronephrosis or nephrolithiasis. Subtle filling defects in the left renal vein   GI TRACT: No bowel obstruction or wall thickening   PERITONEUM: No free air or free fluid. APPENDIX: Unremarkable. Jeane Remedies RETROPERITONEUM: No lymphadenopathy or aortic aneurysm. URINARY BLADDER: No mass or calculus. LYMPH NODES: Prominent bilateral external iliac chain and inguinal lymph nodes. REPRODUCTIVE ORGANS: Unremarkable. FREE FLUID: None. BONES: No destructive bone lesion. ADDITIONAL COMMENTS: N/A.                          CTA CHEST W OR W WO CONT     Result Date: 11/12/2022  1. Acute bilateral lower lobe pulmonary emboli. 2. Patchy bilateral airspace disease and small to moderate left pleural effusion. Mildly enlarged thoracic lymph nodes. 3. Left renal vein thrombus, with slight renal enlargement and perinephric stranding as above. 4. Splenomegaly. No definite splenic vein thrombosis. 5. Pericholecystic fluid. Possible cholelithiasis/sludge. 6. 2.1 cm right thyroid nodule. Recommend outpatient ultrasound, possibly warranting fine-needle aspiration. The findings were called to Dr. Angelito Velez on 11/12/22 at [address] [place] [address] by myself. [phone]      CT ABD PELV W CONT     Result Date: 11/12/2022  1. Acute bilateral lower lobe pulmonary emboli. 2. Patchy bilateral airspace disease and small to moderate left pleural effusion. Mildly enlarged thoracic lymph nodes. 3. Left renal vein thrombus, with slight renal enlargement and perinephric stranding as above. 4. Splenomegaly. No definite splenic vein thrombosis. 5. Pericholecystic fluid. Possible cholelithiasis/sludge. 6. 2.1 cm right thyroid nodule. Recommend outpatient ultrasound, possibly warranting fine-needle aspiration. The findings were called to Dr. Angelito Velez on 11/12/22 at [address] [place] [address] by myself. [phone]          I saw the patient personally, took a history and did a complete physical exam at the bedside. I performed complex decision making in coming up with a diagnostic and treatment plan for the patient. I reviewed the patient's past medical records, current laboratory and radiology results, and actual Xray films/EKG. I have also discussed this case with the involved ED physician.      Care Plan discussed with:    Patient,  ED Doc     Risk of deterioration:  High     Total Time Coordinating Admission:  65   minutes    Total Critical Care Time:           Yan Mcdowell MD

## 2022-11-14 NOTE — CONSULTS
Hematology Oncology Consultation      Reason for consult: hypercoagulable state    Admitting Diagnosis: Bilateral pulmonary embolism ([facility] [address] [I26.99]    Impression: hypercoagulable state manifested by renal vein thrombosis and bilateral pulmonary emboli - Renal vein thrombosis is commonly associated with nephrotic syndrome. Some have hypothesized that the urinary losses of antithrombin III or that a decrease in protein S due to urinary loss or excess binding to increased levels of [id number]-oirzadz protein may be contributing factors. Antiphospholipid antibody syndrome has been documented in about 20% of recurrent renal vein thrombosis in young to middle aged adults. Functional renal vein thrombosis may be seen in those with a suprarenal IVC filter that fills with clots but this clearly is not the case here. She has an implanted etonogestrel, which is a progestogen, and this may have increased her risk of VTE a bit. I have contacted [address] [place] [address] and talked to one fo their MDs about reviewing her method of birth control. We talked about this today and she is comfortable with using an alternative method of birth control. The classic triad of acute flank pain, hematuria,, and sudden deterioration in renal function is seen in 10-20% of cases. More commonly, one sees a more chronic forms usually manifested by slowly worsening renal function, progressive proteinuria, and edema. Anticoagulant or thrombolytic therapy should be considered quickly in those developing symptoms acutely. The role of long term anticoagulation is still somewhat conroversial but may be efficacious in prevention of recurrent thrombosis in the renal vein or somewhere else. Some authors have used the degree of hypoalbuminemia in making a recommendation, indicating that a serum albumin < 2.5 is a risk factor for recurrence. Given that she has had bilateral PE as well, this issue is moot and she will need to be on anticoaulgation.  Fortunately her weight and creatinine are within acceptable range for a DOAC. I will order the more common screening studies for VTE that have a chance of being accurate acute with an event. Discussion: Jerman Pires is a  20y.o. year old seen in consultation at the request of Dr. Diana Anderson for hypercoagulable state. Imaging:  CTA chest, abd and peelvis 11/12/22  THYROID: 2.1 cm right thyroid nodule. Small left thyroid nodules. MEDIASTINUM/SOLANGE: Mildly enlarged bilateral axillary lymph nodes, 1.2-1.3 cm. Right paratracheal lymph nodes at the 1.1 cm. HEART/PERICARDIUM: Unremarkable. AORTA: No aneurysm. PULMONARY ARTERIES: Suboptimal contrast opacification of the pulmonary arterial  circuit, though there are bilateral lower lobe pulmonary emboli. LUNGS/PLEURA: Patchy airspace disease in the bilateral lower lobes and lingula. Small to moderate left pleural effusion. No pneumothorax. BONES: No destructive bone lesion. ADDITIONAL COMMENTS: N/A     LIVER: No mass or biliary dilatation. GALLBLADDER: Pericholecystic fluid. Possible stones/sludge within the  gallbladder. SPLEEN: Enlarged, 16.5 cm in craniocaudal dimension. No definite splenic vein  thrombus. PANCREAS: No mass or ductal dilatation. ADRENALS: No mass. KIDNEYS: Mild asymmetric enlargement of the left kidney relative to the right,  with mild left perinephric stranding. No hydronephrosis or nephrolithiasis. Subtle filling defects in the left renal vein  GI TRACT: No bowel obstruction or wall thickening  PERITONEUM: No free air or free fluid. APPENDIX: Unremarkable. Tim Dixon RETROPERITONEUM: No lymphadenopathy or aortic aneurysm. URINARY BLADDER: No mass or calculus. LYMPH NODES: Prominent bilateral external iliac chain and inguinal lymph nodes. REPRODUCTIVE ORGANS: Unremarkable. FREE FLUID: None. BONES: No destructive bone lesion. IMPRESSION     1. Acute bilateral lower lobe pulmonary emboli.   2. Patchy bilateral airspace disease and small to moderate left pleural  effusion. Mildly enlarged thoracic lymph nodes. 3. Left renal vein thrombus, with slight renal enlargement and perinephric  stranding as above. 4. Splenomegaly. No definite splenic vein thrombosis. 5. Pericholecystic fluid. Possible cholelithiasis/sludge. 6. 2.1 cm right thyroid nodule. Recommend outpatient ultrasound, possibly  warranting fine-needle aspiration. Dopplers lower extremities 11/12/22    No evidence of acute deep vein thrombosis in the right common femoral, femoral, popliteal, posterior tibial, and peroneal veins. The veins were imaged in the transverse and longitudinal planes. The vessels showed normal color filling and compressibility. Doppler interrogation showed phasic and spontaneous flow. No evidence of deep vein thrombosis in the right lower extremity. No evidence of acute deep vein thrombosis in the left common femoral, femoral, popliteal, posterior tibial, and peroneal veins. The veins were imaged in the transverse and longitudinal planes. The vessels showed normal color filling and compressibility. Doppler interrogation showed phasic and spontaneous flow. No evidence of deep vein thrombosis in the left lower extremity. Labs:    Recent Results (from the past 24 hour(s))   METABOLIC PANEL, COMPREHENSIVE    Collection Time: 11/14/22  4:03 AM   Result Value Ref Range    Sodium 135 (L) 136 - 145 mmol/L    Potassium 3.5 3.5 - 5.1 mmol/L    Chloride 104 97 - 108 mmol/L    CO2 22 21 - 32 mmol/L    Anion gap 9 5 - 15 mmol/L    Glucose 106 (H) 65 - 100 mg/dL    BUN 9 6 - 20 MG/DL    Creatinine 0.84 0.55 - 1.02 MG/DL    BUN/Creatinine ratio 11 (L) 12 - 20      eGFR >60 >60 ml/min/1.73m2    Calcium 7.4 (L) 8.5 - 10.1 MG/DL    Bilirubin, total 0.4 0.2 - 1.0 MG/DL    ALT (SGPT) 9 (L) 12 - 78 U/L    AST (SGOT) 16 15 - 37 U/L    Alk.  phosphatase 62 45 - 117 U/L    Protein, total 6.2 (L) 6.4 - 8.2 g/dL    Albumin 1.0 (L) 3.5 - 5.0 g/dL    Globulin 5.2 (H) 2.0 - 4.0 g/dL A-G Ratio 0.2 (L) 1.1 - 2.2     CBC W/O DIFF    Collection Time: 11/14/22  4:03 AM   Result Value Ref Range    WBC 8.8 3.6 - 11.0 K/uL    RBC 3.73 (L) 3.80 - 5.20 M/uL    HGB 10.4 (L) 11.5 - 16.0 g/dL    HCT 31.0 (L) 35.0 - 47.0 %    MCV 83.1 80.0 - 99.0 FL    MCH 27.9 26.0 - 34.0 PG    MCHC 33.5 30.0 - 36.5 g/dL    RDW 12.8 11.5 - 14.5 %    PLATELET 570 252 - 265 K/uL    MPV 13.0 (H) 8.9 - 12.9 FL    NRBC 0.0 0  WBC    ABSOLUTE NRBC 0.00 0.00 - 0.01 K/uL   HEP B SURFACE AB    Collection Time: 11/14/22  4:03 AM   Result Value Ref Range    Hepatitis B surface Ab 8.63 mIU/mL    Hep B surface Ab Interp. NONREACTIVE NR     HEP B SURFACE AG    Collection Time: 11/14/22  4:03 AM   Result Value Ref Range    Hepatitis B surface Ag <0.10 Index    Hep B surface Ag Interp.  Negative NEG     T3, FREE    Collection Time: 11/14/22  4:03 AM   Result Value Ref Range    Free Triiodothyronine (T3) 1.3 (L) 2.2 - 4.0 pg/mL   T4, FREE    Collection Time: 11/14/22  4:03 AM   Result Value Ref Range    T4, Free 1.1 0.8 - 1.5 NG/DL       History:  Past Medical History:   Diagnosis Date    Anxiety and depression       Past Surgical History:   Procedure Laterality Date    HX WISDOM TEETH EXTRACTION        Prior to Admission medications    Not on File     No Known Allergies   Social History     Tobacco Use    Smoking status: Never    Smokeless tobacco: Never   Substance Use Topics    Alcohol use: Yes     Comment: rarely      Family History   Problem Relation Age of Onset    No Known Problems Mother     No Known Problems Father     Cancer Sister         hodgkins lymphoma    No Known Problems Brother     Lung Cancer Maternal Grandmother     Diabetes Maternal Grandmother     Hypertension Maternal Grandmother     No Known Problems Maternal Grandfather     Cancer Paternal Grandmother     No Known Problems Paternal Grandfather         Current Medications:  Current Facility-Administered Medications   Medication Dose Route Frequency hydrOXYzine HCL (ATARAX) tablet 25 mg  25 mg Oral TID PRN    enoxaparin (LOVENOX) injection 120 mg  1 mg/kg SubCUTAneous Q12H    sodium chloride (NS) flush 5-40 mL  5-40 mL IntraVENous Q8H    sodium chloride (NS) flush 5-40 mL  5-40 mL IntraVENous PRN    acetaminophen (TYLENOL) tablet 650 mg  650 mg Oral Q6H PRN    Or    acetaminophen (TYLENOL) suppository 650 mg  650 mg Rectal Q6H PRN    polyethylene glycol (MIRALAX) packet 17 g  17 g Oral DAILY    bisacodyL (DULCOLAX) tablet 5 mg  5 mg Oral DAILY PRN    promethazine (PHENERGAN) tablet 12.5 mg  12.5 mg Oral Q6H PRN    Or    ondansetron (ZOFRAN) injection 4 mg  4 mg IntraVENous Q6H PRN    oxyCODONE IR (ROXICODONE) tablet 5 mg  5 mg Oral Q6H PRN    melatonin tablet 3 mg  3 mg Oral QHS PRN    hydrALAZINE (APRESOLINE) 20 mg/mL injection 20 mg  20 mg IntraVENous Q6H PRN     Review of Systems: negative for 11 organ systems except as noted above     Physical Exam:  Constitutional Alert, cooperative, oriented. Mood and affect appropriate. Appears close to chronological age. Well nourished. Well developed. Head Normocephalic; no scars   Eyes Conjunctivae and sclerae are clear and without icterus. Pupils are round   ENMT Sinuses are nontender. No oral exudates, ulcers, masses, thrush or mucositis. Oropharynx clear. Tongue normal.   Neck Supple without masses or thyromegaly. No jugular venous distension. Hematologic/Lymphatic No petechiae or purpura. No tender or palpable lymph nodes in the cervical, supraclavicular, axillary or inguinal area. Respiratory Lungs are clear to auscultation without rhonchi or wheezing. Cardiovascular Regular rate and rhythm of heart without murmurs, gallops or rubs. Chest / Line Site Chest is symmetric with no chest wall deformities. Abdomen Non-tender, non-distended, no masses, ascites or hepatosplenomegaly. Good bowel sounds. No guarding or rebound tenderness. No pulsatile masses.    Musculoskeletal No tenderness or swelling, normal range of motion without obvious weakness. Extremities No visible deformities, no cyanosis, clubbing or edema. Reports her left leg is bigger than right since birth. Skin No rashes, scars, or lesions suggestive of malignancy. No petechiae, purpura, or ecchymoses. No excoriations. Neurologic No sensory or motor deficits, normal cerebellar function, normal gait, cranial nerves intact. Psychiatric Alert and oriented. Coherent speech. Verbalizes understanding of our discussions today.        Taras Favre MD [facility] office  [address]  Phone [phone]  Fax [phone]

## 2022-11-15 ENCOUNTER — APPOINTMENT (OUTPATIENT)
Dept: GENERAL RADIOLOGY | Age: 22
DRG: 176 | End: 2022-11-15
Attending: STUDENT IN AN ORGANIZED HEALTH CARE EDUCATION/TRAINING PROGRAM
Payer: COMMERCIAL

## 2022-11-15 LAB
ALBUMIN SERPL-MCNC: 1 G/DL (ref 3.5–5)
ALBUMIN/GLOB SERPL: 0.2 {RATIO} (ref 1.1–2.2)
ALP SERPL-CCNC: 53 U/L (ref 45–117)
ALT SERPL-CCNC: 9 U/L (ref 12–78)
ANION GAP SERPL CALC-SCNC: 8 MMOL/L (ref 5–15)
AST SERPL-CCNC: 21 U/L (ref 15–37)
BILIRUB SERPL-MCNC: 0.4 MG/DL (ref 0.2–1)
BUN SERPL-MCNC: 10 MG/DL (ref 6–20)
BUN/CREAT SERPL: 14 (ref 12–20)
CALCIUM SERPL-MCNC: 7 MG/DL (ref 8.5–10.1)
CHLORIDE SERPL-SCNC: 101 MMOL/L (ref 97–108)
CO2 SERPL-SCNC: 23 MMOL/L (ref 21–32)
COLLECT DURATION TIME UR: ABNORMAL HR
COVID-19 RAPID TEST, COVR: NOT DETECTED
CREAT SERPL-MCNC: 0.71 MG/DL (ref 0.55–1.02)
ERYTHROCYTE [DISTWIDTH] IN BLOOD BY AUTOMATED COUNT: 12.7 % (ref 11.5–14.5)
FLUAV AG NPH QL IA: NEGATIVE
FLUBV AG NOSE QL IA: NEGATIVE
GLOBULIN SER CALC-MCNC: 4.8 G/DL (ref 2–4)
GLUCOSE SERPL-MCNC: 91 MG/DL (ref 65–100)
HCT VFR BLD AUTO: 29.7 % (ref 35–47)
HGB BLD-MCNC: 10.2 G/DL (ref 11.5–16)
KAPPA LC FREE UR-MCNC: 2394.5 MG/L (ref 1.17–86.46)
KAPPA LC FREE/LAMBDA FREE UR: 2.31 (ref 1.83–14.26)
LAMBDA LC FREE UR-MCNC: 1036.39 MG/L (ref 0.27–15.21)
MCH RBC QN AUTO: 28.3 PG (ref 26–34)
MCHC RBC AUTO-ENTMCNC: 34.3 G/DL (ref 30–36.5)
MCV RBC AUTO: 82.3 FL (ref 80–99)
NRBC # BLD: 0 K/UL (ref 0–0.01)
NRBC BLD-RTO: 0 PER 100 WBC
PLATELET # BLD AUTO: 182 K/UL (ref 150–400)
PMV BLD AUTO: 12.8 FL (ref 8.9–12.9)
POTASSIUM SERPL-SCNC: 3.3 MMOL/L (ref 3.5–5.1)
PROT SERPL-MCNC: 5.8 G/DL (ref 6.4–8.2)
RBC # BLD AUTO: 3.61 M/UL (ref 3.8–5.2)
SODIUM SERPL-SCNC: 132 MMOL/L (ref 136–145)
SOURCE, COVRS: NORMAL
SPECIMEN VOL ?TM UR: ABNORMAL ML
WBC # BLD AUTO: 7.6 K/UL (ref 3.6–11)

## 2022-11-15 PROCEDURE — 71045 X-RAY EXAM CHEST 1 VIEW: CPT

## 2022-11-15 PROCEDURE — 87040 BLOOD CULTURE FOR BACTERIA: CPT

## 2022-11-15 PROCEDURE — 87086 URINE CULTURE/COLONY COUNT: CPT

## 2022-11-15 PROCEDURE — 87635 SARS-COV-2 COVID-19 AMP PRB: CPT

## 2022-11-15 PROCEDURE — 74011000258 HC RX REV CODE- 258: Performed by: STUDENT IN AN ORGANIZED HEALTH CARE EDUCATION/TRAINING PROGRAM

## 2022-11-15 PROCEDURE — 76937 US GUIDE VASCULAR ACCESS: CPT

## 2022-11-15 PROCEDURE — 74011250636 HC RX REV CODE- 250/636: Performed by: STUDENT IN AN ORGANIZED HEALTH CARE EDUCATION/TRAINING PROGRAM

## 2022-11-15 PROCEDURE — 65660000001 HC RM ICU INTERMED STEPDOWN

## 2022-11-15 PROCEDURE — 87804 INFLUENZA ASSAY W/OPTIC: CPT

## 2022-11-15 PROCEDURE — 74011250637 HC RX REV CODE- 250/637: Performed by: STUDENT IN AN ORGANIZED HEALTH CARE EDUCATION/TRAINING PROGRAM

## 2022-11-15 PROCEDURE — 81241 F5 GENE: CPT

## 2022-11-15 PROCEDURE — 74011250637 HC RX REV CODE- 250/637: Performed by: INTERNAL MEDICINE

## 2022-11-15 PROCEDURE — 74011250636 HC RX REV CODE- 250/636: Performed by: INTERNAL MEDICINE

## 2022-11-15 PROCEDURE — 36415 COLL VENOUS BLD VENIPUNCTURE: CPT

## 2022-11-15 PROCEDURE — 74011000250 HC RX REV CODE- 250: Performed by: INTERNAL MEDICINE

## 2022-11-15 PROCEDURE — 80053 COMPREHEN METABOLIC PANEL: CPT

## 2022-11-15 PROCEDURE — 85027 COMPLETE CBC AUTOMATED: CPT

## 2022-11-15 PROCEDURE — 81240 F2 GENE: CPT

## 2022-11-15 RX ORDER — POTASSIUM CHLORIDE 20 MEQ/1
20 TABLET, EXTENDED RELEASE ORAL 2 TIMES DAILY
Status: COMPLETED | OUTPATIENT
Start: 2022-11-15 | End: 2022-11-15

## 2022-11-15 RX ADMIN — SODIUM CHLORIDE, PRESERVATIVE FREE 10 ML: 5 INJECTION INTRAVENOUS at 20:01

## 2022-11-15 RX ADMIN — ENOXAPARIN SODIUM 120 MG: 150 INJECTION SUBCUTANEOUS at 08:35

## 2022-11-15 RX ADMIN — ACETAMINOPHEN 650 MG: 325 TABLET ORAL at 19:56

## 2022-11-15 RX ADMIN — ENOXAPARIN SODIUM 120 MG: 150 INJECTION SUBCUTANEOUS at 19:56

## 2022-11-15 RX ADMIN — POTASSIUM CHLORIDE 20 MEQ: 1500 TABLET, EXTENDED RELEASE ORAL at 18:50

## 2022-11-15 RX ADMIN — ACETAMINOPHEN 650 MG: 325 TABLET ORAL at 12:55

## 2022-11-15 RX ADMIN — POTASSIUM CHLORIDE 20 MEQ: 1500 TABLET, EXTENDED RELEASE ORAL at 08:35

## 2022-11-15 RX ADMIN — AZITHROMYCIN DIHYDRATE 500 MG: 500 INJECTION, POWDER, LYOPHILIZED, FOR SOLUTION INTRAVENOUS at 18:50

## 2022-11-15 RX ADMIN — SODIUM CHLORIDE, PRESERVATIVE FREE 10 ML: 5 INJECTION INTRAVENOUS at 05:03

## 2022-11-15 RX ADMIN — SODIUM CHLORIDE, PRESERVATIVE FREE 10 ML: 5 INJECTION INTRAVENOUS at 18:57

## 2022-11-15 RX ADMIN — ACETAMINOPHEN 650 MG: 325 TABLET ORAL at 06:38

## 2022-11-15 RX ADMIN — SODIUM CHLORIDE 1 G: 900 INJECTION INTRAVENOUS at 18:49

## 2022-11-15 NOTE — PROGRESS NOTES
Hospitalist Progress Note    NAME: Marry Mayer   :  2000   MRN:  [id number]            Subjective:     Chief Complaint / Reason for Physician Visit  Discussed with RN events overnight. Pt c/o right sided flank pain. Able to ambulate without difficulty. Aware of procedure tomorrow. All questions answered. Review of Systems:  Symptom Y/N Comments  Symptom Y/N Comments   Fever/Chills n   Chest Pain n    Poor Appetite n   Edema n    Cough n   Abdominal Pain n    Sputum n   Joint Pain n    SOB/LUNA n   Pruritis/Rash n    Nausea/vomit n   Tolerating PT/OT     Diarrhea n   Tolerating Diet     Constipation n   Other       Could NOT obtain due to:      Objective:     VITALS:   Last 24hrs VS reviewed since prior progress note. Most recent are:  Patient Vitals for the past 24 hrs:   Temp Pulse Resp BP SpO2   11/15/22 1503 (!) 100.6 °F (38.1 °C) (!) 125 18 (!) 146/74 97 %   11/15/22 1255 (!) 102.4 °F (39.1 °C) -- -- -- --   11/15/22 1133 (!) 102.3 °F (39.1 °C) (!) 109 16 107/74 96 %   11/15/22 1122 -- (!) 115 -- 107/74 --   11/15/22 0837 98.8 °F (37.1 °C) -- -- -- --   11/15/22 0800 -- (!) 118 -- -- --   11/15/22 0736 (!) 102.9 °F (39.4 °C) (!) 126 17 134/75 98 %   11/15/22 0637 (!) 102.8 °F (39.3 °C) -- -- -- --   11/15/22 0445 99.9 °F (37.7 °C) -- -- -- --   11/15/22 0338 (!) 101 °F (38.3 °C) (!) 119 20 (!) 147/87 96 %   22 2229 100.1 °F (37.8 °C) (!) 114 18 137/74 97 %   22 2100 (!) 100.5 °F (38.1 °C) -- -- -- --   22 (!) 100.8 °F (38.2 °C) (!) 125 20 137/79 96 %         Intake/Output Summary (Last 24 hours) at 11/15/2022 1720  Last data filed at 11/15/2022 1133  Gross per 24 hour   Intake --   Output 300 ml   Net -300 ml          PHYSICAL EXAM:  General: WD, WN. Alert, cooperative, no acute distress    EENT:  EOMI. Anicteric sclerae. MMM  Resp:  CTA bilaterally, no wheezing or rales. No accessory muscle use  CV:  Regular  rhythm,  No edema  GI:  Soft, Non distended, Non tender.   +Bowel sounds  Neurologic:  Alert and oriented X 3, normal speech,   Psych:   Good insight. Not anxious nor agitated  Skin:  No rashes. No jaundice    Procedures: see electronic medical records for all procedures/Xrays and details which were not copied into this note but were reviewed prior to creation of Plan. LABS:  I reviewed today's most current labs and imaging studies. Pertinent labs include:  Recent Labs     11/15/22  0307 11/14/22 0403 11/13/22 0219   WBC 7.6 8.8 9.6   HGB 10.2* 10.4* 11.6   HCT 29.7* 31.0* 35.2    163 115*       Recent Labs     11/15/22  0307 11/14/22  0403 11/13/22  0219   * 135* 135*   K 3.3* 3.5 3.3*    104 105   CO2 23 22 22   GLU 91 106* 101*   BUN 10 9 10   CREA 0.71 0.84 0.79   CA 7.0* 7.4* 7.2*   ALB 1.0* 1.0* 1.3*   TBILI 0.4 0.4 0.6   ALT 9* 9* 12         Signed: Cecily Spatz, MD        Reviewed most current lab test results and cultures  YES  Reviewed most current radiology test results   YES  Review and summation of old records today    NO  Reviewed patient's current orders and MAR    YES  PMH/SH reviewed - no change compared to H&P      Assessment / Plan:  Acute bilateral pulmonary emboli POA  Left renal vein thrombosis POA  Presents with pleuritic flank pain  LE dopplers-negative  HS troponin and pBNP-negative  COVID negative  Telemetry  Presence of left renal vein thrombosis argues for possible hypercoaguable state              Has hormonal implant for pregnancy prevention              > 300 mg protein in urine and serum albumin 1.3 raises issue of nephrotic syndrome                          Check spot urine protein/creat ratio  Consulted hematology and nephrology, expertise appreciated  Lovenox 1 mg/kg, hold for renal biopsy then transition to NOAC after renal bx. Follow up outpatient for discussion of birth control options.      SIRS POA , respiratory rate 38, temp 100.8  Acute bilateral patchy airspace disease POA questionable infection versus pulmonary infarction  Upper respiratory infection POA  Ruled out COVID-19 POA with fever, upper respiratory symptoms, new PEs, diarrhea  CT chest with bilateral PE, patchy bilateral airspace disease  Concern for underlying infection in addition to the pulmonary emboli              Fever could also be due to the blood clots  Rapid COVID-negative  Influenza A/B antigen testing negative  Respiratory PCR-negative  Procalcitonin 0.06 Argues against bacterial pneumonia  Ucx negative              Hold antibiotics  11/15-persistent fever. Some tachycardia. Repeated CXR-atelectasis vs pna vs pulmonary infarcts, repeated cultures, and viral panels. No new complaints of cough, etc. Started ceftriaxone and azithromycin. Continue to follow. 2.1 cm right thyroid nodule  Followed prior to admit, reports she had a biopsy that was benign several months ago  TSH 6.80, FT3 1.3, FT4 1.1  PCP follow up after acute illness     Cholelithiasis POA  Intermittent RUQ pain, currently non tender    40 or above Morbid obesity / Body mass index is 44.29 kg/m². Code status: Full  Prophylaxis: Lovenox  Recommended Disposition: Home w/Family     ________________________________________________________________________  Care Plan discussed with:    Comments   Patient x    Family      RN     Care Manager     Consultant  x                      Multidiciplinary team rounds were held today with , nursing, pharmacist and clinical coordinator. Patient's plan of care was discussed; medications were reviewed and discharge planning was addressed.      ________________________________________________________________________  Total NON critical care TIME:  35   Minutes    Total CRITICAL CARE TIME Spent:   Minutes non procedure based      Comments   >50% of visit spent in counseling and coordination of care     ________________________________________________________________________  Ann Seals MD

## 2022-11-15 NOTE — PROGRESS NOTES
11/14/22 2229   Vitals   Temp 100.1 °F (37.8 °C)   Pulse (Heart Rate) (!) 114   Resp Rate 18   O2 Sat (%) 97 %   Level of Consciousness 0   /74   MAP (Monitor) 88   MAP (Calculated) 95   Cardiac Rhythm Sinus Tachy   MEWS Score 3   Pain 1   Pain Scale 1 Numeric (0 - 10)   Pain Intensity 1 0   Patient Stated Pain Goal 0   Pain Reassessment 1 Yes   POSS Scale   Opioid Sedation Scale 1   Oxygen Therapy   O2 Device None (Room air)   Patient Observation   Repositioned Head of bed elevated (degrees)   Patient Turned Turns self   Ambulate Ambulate in room   Activity In bed;Family/Visitors present   Mode of Transportation Wheelchair   MEWS 3 r/t HR, charge RN aware, will continue to monitor

## 2022-11-15 NOTE — PROGRESS NOTES
Maria Cd of Shift Note    Bedside shift change report given to ITALO Rodas (oncoming nurse) by Chrissy Mccann RN (offgoing nurse). Report included the following information SBAR, Kardex, ED Summary, Intake/Output, MAR, and Recent Results    Shift worked:  night     Shift summary and any significant changes:     Patient remains febrile overnight, PRN tylenol and ice packs used for correction     24 hour urine collection remains in progress, no missed voids     Concerns for physician to address:  Persistent fevers     Zone phone for oncoming shift:   xxx       Activity:  Activity Level: Up ad leticia  Number times ambulated in hallways past shift: 0  Number of times OOB to chair past shift: 0    Cardiac:   Cardiac Monitoring: Yes      Cardiac Rhythm: Sinus Tachy    Access:  Current line(s): PIV     Genitourinary:   Urinary status: voiding    Respiratory:   O2 Device: None (Room air)  Chronic home O2 use?: NO  Incentive spirometer at bedside: NO       GI:  Last Bowel Movement Date: 11/13/22  Current diet:  ADULT DIET Regular  Passing flatus: YES  Tolerating current diet: YES       Pain Management:   Patient states pain is manageable on current regimen: YES    Skin:  Gaurav Score: 21  Interventions: increase time out of bed    Patient Safety:  Fall Score:  Total Score: 1  Interventions: pt to call before getting OOB       Length of Stay:  Expected LOS: - - -  Actual LOS: 3      Chrissy Mccann RN

## 2022-11-15 NOTE — PROGRESS NOTES
11/15/22 0338   Vitals   Temp (!) 101 °F (38.3 °C)   Temp Source Oral   Pulse (Heart Rate) (!) 119   Resp Rate 20   O2 Sat (%) 96 %   Level of Consciousness 0   BP (!) 147/87   MAP (Monitor) 104   MAP (Calculated) 107   Cardiac Rhythm Sinus Tachy   MEWS Score 3   Pain 1   Pain Scale 1 Numeric (0 - 10)   Pain Intensity 1 0   Patient Stated Pain Goal 0   Pain Reassessment 1 Yes   POSS Scale   Opioid Sedation Scale 1   Oxygen Therapy   Pulse via Oximetry 120 beats per minute   O2 Device None (Room air)   Patient Observation   Repositioned Head of bed elevated (degrees)   Patient Turned Turns self; Turned on Left Side   Ambulate Ambulate in room   Activity In bed;Family/Visitors present   Mode of Transportation Wheelchair   MEWS 3 r/t elevated HR, BP, and temperature, ice packs applied, charge RN aware, will continue to monitor

## 2022-11-15 NOTE — PROGRESS NOTES
Transition of Care Plan:    RUR: 5%- Low Risk  Disposition:Home   Follow up appointments: PCP, Speicalist  DME needed: none  Transportation at Discharge: Boyfriend to provide transportation  Keys or means to access home:    Pt has access  IM Medicare Letter: n/a- BS of [employer]  Caregiver Contact: Thao Henson [phone]  Discharge Caregiver contacted prior to discharge? If pt desires  Care Conference needed?:        n/a    CM introduced self and role to pt at bedside, verified pt demographics, insurance info and emergency contact. Pt lives with boyfriend and 2  Sisters. Pt is independent with ADL's =, ambulating and drives. No Dme, HH or SNF in the past. Pt uses [facility]. Reason for Admission:   Bilateral PE and renal vein thrombosis. RUR Score:    5%                 Plan for utilizing home health:      none    PCP: First and Last name:  Adrian Smith NP     Name of Practice:    Are you a current patient: Yes/No: Yes   Approximate date of last visit:  2 Months ago   Can you participate in a virtual visit with your PCP:                     Current Advanced Directive/Advance Care Plan: Full Code      Healthcare Decision Maker:   Click here to complete [address] including selection of the Healthcare Decision Maker Relationship (ie \"Primary\")         Thao Block - [phone]                    Transition of Care Plan:                            Home     Care Management Interventions  Mode of Transport at Discharge:  Other (see comment)  Transition of Care Consult (CM Consult): Discharge Planning  Support Systems: Parent(s), Other Family Member(s), Friend/Neighbor  Discharge Location  Patient Expects to be Discharged to[de-identified] [facility]  Phone: [phone]

## 2022-11-15 NOTE — PROGRESS NOTES
Hematology Oncology Progress Note       Follow up for: bilateral PE and renal vein thrombosis. Chart notes reviewed since last visit. Case discussed with following: patient. Patient complains of the following: feeling rather less energetic than yesterday - running fever. Additional concerns noted by the staff:     Patient Vitals for the past 24 hrs:   BP Temp Pulse Resp SpO2 Weight   11/15/22 1133 107/74 (!) 102.3 °F (39.1 °C) (!) 109 16 96 % --   11/15/22 1122 107/74 -- (!) 115 -- -- --   11/15/22 0837 -- 98.8 °F (37.1 °C) -- -- -- --   11/15/22 0800 -- -- (!) 118 -- -- --   11/15/22 0736 134/75 (!) 102.9 °F (39.4 °C) (!) 126 17 98 % --   11/15/22 0637 -- (!) 102.8 °F (39.3 °C) -- -- -- 117 kg (258 lb)   11/15/22 0445 -- 99.9 °F (37.7 °C) -- -- -- --   11/15/22 0338 (!) 147/87 (!) 101 °F (38.3 °C) (!) 119 20 96 % --   11/14/22 2229 137/74 100.1 °F (37.8 °C) (!) 114 18 97 % --   11/14/22 2100 -- (!) 100.5 °F (38.1 °C) -- -- -- --   11/14/22 1945 137/79 (!) 100.8 °F (38.2 °C) (!) 125 20 96 % --   11/14/22 1452 131/73 97.6 °F (36.4 °C) (!) 118 17 98 % --       Review of Systems: negative for 11 organ systems except as noted. Physical Examination:  Constitutional Alert, cooperative, oriented. Mood and affect appropriate. Appears close to chronological age. Well nourished. Well developed. Seems more ill today   Head Normocephalic; no scars   Eyes Conjunctivae and sclerae are clear and without icterus. Pupils are round   ENMT Sinuses are nontender. No oral exudates, ulcers, masses, thrush or mucositis. Oropharynx clear. Tongue normal.   Neck Supple without masses or thyromegaly. No jugular venous distension. Hematologic/Lymphatic No petechiae or purpura. No tender or palpable lymph nodes appreciated   Respiratory Lungs are clear to auscultation without rhonchi or wheezing. Cardiovascular Regular rate and rhythm of heart without murmurs, gallops or rubs.    Chest / Line Site Chest is symmetric with no chest wall deformities. Abdomen Non-tender, non-distended, no masses, ascites or hepatosplenomegaly. Good bowel sounds. Musculoskeletal No tenderness or swelling, normal range of motion without obvious weakness. Extremities No visible deformities, no cyanosis, clubbing or edema. Skin No rashes, scars, or lesions suggestive of malignancy. No petechiae, purpura, or ecchymoses. No excoriations. Neurologic No sensory or motor deficits noted but not specifically tested   Psychiatric Alert and oriented. Coherent speech. Verbalizes understanding of our discussions today.        Labs:  Recent Results (from the past 24 hour(s))   FREE LIGHT CHAINS, KAPPA/LAMBDA, UR, QT(BENCE-PEREZ)    Collection Time: 11/14/22  4:06 PM   Result Value Ref Range    Total volume UR mL    Period of collection UR hr    Free Kappa Lt Chains, urine 2,394.50 (H) 1.17 - 86.46 mg/L    Free Lambda Lt Chains, urine 1,036.39 (H) 0.27 - 15.21 mg/L    Kappa/Lambda Ratio, urine 2.31 1.83 - 14.26     CBC W/O DIFF    Collection Time: 11/15/22  3:07 AM   Result Value Ref Range    WBC 7.6 3.6 - 11.0 K/uL    RBC 3.61 (L) 3.80 - 5.20 M/uL    HGB 10.2 (L) 11.5 - 16.0 g/dL    HCT 29.7 (L) 35.0 - 47.0 %    MCV 82.3 80.0 - 99.0 FL    MCH 28.3 26.0 - 34.0 PG    MCHC 34.3 30.0 - 36.5 g/dL    RDW 12.7 11.5 - 14.5 %    PLATELET 336 952 - 850 K/uL    MPV 12.8 8.9 - 12.9 FL    NRBC 0.0 0  WBC    ABSOLUTE NRBC 0.00 0.00 - 5.90 K/uL   METABOLIC PANEL, COMPREHENSIVE    Collection Time: 11/15/22  3:07 AM   Result Value Ref Range    Sodium 132 (L) 136 - 145 mmol/L    Potassium 3.3 (L) 3.5 - 5.1 mmol/L    Chloride 101 97 - 108 mmol/L    CO2 23 21 - 32 mmol/L    Anion gap 8 5 - 15 mmol/L    Glucose 91 65 - 100 mg/dL    BUN 10 6 - 20 MG/DL    Creatinine 0.71 0.55 - 1.02 MG/DL    BUN/Creatinine ratio 14 12 - 20      eGFR >60 >60 ml/min/1.73m2    Calcium 7.0 (L) 8.5 - 10.1 MG/DL    Bilirubin, total 0.4 0.2 - 1.0 MG/DL    ALT (SGPT) 9 (L) 12 - 78 U/L    AST (SGOT) 21 15 - 37 U/L    Alk. phosphatase 53 45 - 117 U/L    Protein, total 5.8 (L) 6.4 - 8.2 g/dL    Albumin 1.0 (L) 3.5 - 5.0 g/dL    Globulin 4.8 (H) 2.0 - 4.0 g/dL    A-G Ratio 0.2 (L) 1.1 - 2.2         Imaging:  CTA chest, abd and peelvis 11/12/22  THYROID: 2.1 cm right thyroid nodule. Small left thyroid nodules. MEDIASTINUM/SOLANGE: Mildly enlarged bilateral axillary lymph nodes, 1.2-1.3 cm. Right paratracheal lymph nodes at the 1.1 cm. HEART/PERICARDIUM: Unremarkable. AORTA: No aneurysm. PULMONARY ARTERIES: Suboptimal contrast opacification of the pulmonary arterial  circuit, though there are bilateral lower lobe pulmonary emboli. LUNGS/PLEURA: Patchy airspace disease in the bilateral lower lobes and lingula. Small to moderate left pleural effusion. No pneumothorax. BONES: No destructive bone lesion. ADDITIONAL COMMENTS: N/A     LIVER: No mass or biliary dilatation. GALLBLADDER: Pericholecystic fluid. Possible stones/sludge within the  gallbladder. SPLEEN: Enlarged, 16.5 cm in craniocaudal dimension. No definite splenic vein  thrombus. PANCREAS: No mass or ductal dilatation. ADRENALS: No mass. KIDNEYS: Mild asymmetric enlargement of the left kidney relative to the right,  with mild left perinephric stranding. No hydronephrosis or nephrolithiasis. Subtle filling defects in the left renal vein  GI TRACT: No bowel obstruction or wall thickening  PERITONEUM: No free air or free fluid. APPENDIX: Unremarkable. Jacqueline Kiran RETROPERITONEUM: No lymphadenopathy or aortic aneurysm. URINARY BLADDER: No mass or calculus. LYMPH NODES: Prominent bilateral external iliac chain and inguinal lymph nodes. REPRODUCTIVE ORGANS: Unremarkable. FREE FLUID: None. BONES: No destructive bone lesion. IMPRESSION     1. Acute bilateral lower lobe pulmonary emboli. 2. Patchy bilateral airspace disease and small to moderate left pleural  effusion. Mildly enlarged thoracic lymph nodes.   3. Left renal vein thrombus, with slight renal enlargement and perinephric  stranding as above. 4. Splenomegaly. No definite splenic vein thrombosis. 5. Pericholecystic fluid. Possible cholelithiasis/sludge. 6. 2.1 cm right thyroid nodule. Recommend outpatient ultrasound, possibly  warranting fine-needle aspiration. Dopplers lower extremities 11/12/22     No evidence of acute deep vein thrombosis in the right common femoral, femoral, popliteal, posterior tibial, and peroneal veins. The veins were imaged in the transverse and longitudinal planes. The vessels showed normal color filling and compressibility. Doppler interrogation showed phasic and spontaneous flow. No evidence of deep vein thrombosis in the right lower extremity. No evidence of acute deep vein thrombosis in the left common femoral, femoral, popliteal, posterior tibial, and peroneal veins. The veins were imaged in the transverse and longitudinal planes. The vessels showed normal color filling and compressibility. Doppler interrogation showed phasic and spontaneous flow. No evidence of deep vein thrombosis in the left lower extremity. Assessment and Plan:   hypercoagulable state manifested by renal vein thrombosis and bilateral pulmonary emboli - Renal vein thrombosis is commonly associated with nephrotic syndrome. Some have hypothesized that the urinary losses of antithrombin III or that a decrease in protein S due to urinary loss or excess binding to increased levels of J2h-ocajovi protein may be contributing factors. Antiphospholipid antibody syndrome has been documented in about 20% of recurrent renal vein thrombosis in young to middle aged adults. Functional renal vein thrombosis may be seen in those with a suprarenal IVC filter that fills with clots but this clearly is not the case here. She has an implanted etonogestrel, which is a progestogen, and this may have increased her risk of VTE a bit.  I have contacted [facility] and talked to one fo their MDs about reviewing her method of birth control. We talked about this today and she is comfortable with using an alternative method of birth control. That MD suggested that pt followup with Dr. Ulysses Miller as outpatient to discuss potentially removing it. The classic triad of acute flank pain, hematuria,, and sudden deterioration in renal function is seen in 10-20% of cases. More commonly, one sees a more chronic forms usually manifested by slowly worsening renal function, progressive proteinuria, and edema. Anticoagulant or thrombolytic therapy should be considered quickly in those developing symptoms acutely. The role of long term anticoagulation is still somewhat conroversial but may be efficacious in prevention of recurrent thrombosis in the renal vein or somewhere else. Some authors have used the degree of hypoalbuminemia in making a recommendation, indicating that a serum albumin < 2.5 is a risk factor for recurrence. Given that she has had bilateral PE as well, this issue is moot and she will need to be on anticoaulgation. Fortunately her weight and creatinine are within acceptable range for a DOAC. I ordered the more common screening studies for VTE that have a chance of being accurate acute with an event. Acutely protein C, S, ATIII will be impacted by consumption during clot, while paradoxically sometimes going up as an acute phase reactant. Given her young age, these ideally would be checked but as noted earlier, you can lose these as part of the nephrotic syndrome. Await results.           Alexandra Lopez MD [facility] office  [address]  [place], [address]  Phone [phone]  Fax [phone]

## 2022-11-15 NOTE — PROGRESS NOTES
Problem: Falls - Risk of  Goal: *Absence of Falls  Description: Document Dee Hernadez Fall Risk and appropriate interventions in the flowsheet.   Outcome: Progressing Towards Goal  Note: Fall Risk Interventions:            Medication Interventions: Patient to call before getting OOB         History of Falls Interventions: Bed/chair exit alarm, Consult care management for discharge planning, Evaluate medications/consider consulting pharmacy, Investigate reason for fall, Utilize gait belt for transfer/ambulation         Problem: Patient Education: Go to Patient Education Activity  Goal: Patient/Family Education  Outcome: Progressing Towards Goal     Problem: Pulmonary Embolism Care Plan (Adult)  Goal: *Improvement of existing pulmonary embolism  Outcome: Progressing Towards Goal  Goal: *Absence of bleeding  Outcome: Progressing Towards Goal  Goal: *Labs within defined limits  Outcome: Progressing Towards Goal     Problem: Patient Education: Go to Patient Education Activity  Goal: Patient/Family Education  Outcome: Progressing Towards Goal

## 2022-11-15 NOTE — PROGRESS NOTES
0700: Bedside shift report received from [facility]. Report consisted of SBAR, Kardex, MAR, Recent Results, intake/output, and cardiac rhythm. 1255: Tylenol given for temp of 102.4    adEnd of Shift Note    Bedside shift change report given to Velma Dotson (oncoming nurse) by Magy Barnes RN (offgoing nurse). Report included the following information SBAR, Kardex, MAR, Recent Results, intake/output, and cardiac rhythm. Shift worked:  [id number]     Shift summary and any significant changes:     24hr urine collection complete and taken to lab    Patient continued to have fever despite tylenol and ice packs. Dr. Carson Gould aware. Blood cultures, urine cultures, COVID test and flu test ordered     Blood cultures, COVID test, and flu complete. Urine culture still needed. Concerns for physician to address:       Zone phone for oncoming shift:          Activity:  Activity Level: Up ad leticia  Number times ambulated in hallways past shift: 0  Number of times OOB to chair past shift: 3    Cardiac:   Cardiac Monitoring: Yes      Cardiac Rhythm: Sinus Tachy    Access:  Current line(s): PIV     Genitourinary:   Urinary status: voiding    Respiratory:   O2 Device: None (Room air)  Chronic home O2 use?: NO  Incentive spirometer at bedside: NO       GI:  Last Bowel Movement Date: 11/13/22  Current diet:  ADULT DIET Regular  Passing flatus: YES  Tolerating current diet: YES       Pain Management:   Patient states pain is manageable on current regimen: YES    Skin:  Gaurav Score: 21  Interventions: increase time out of bed    Patient Safety:  Fall Score:  Total Score: 1  Interventions: bed/chair alarm, assistive device (walker, cane, etc), gripper socks, and pt to call before getting OOB       Length of Stay:  Expected LOS: 2d 14h  Actual LOS: 3      Magy Barnes, ITALO

## 2022-11-15 NOTE — PROGRESS NOTES
Endurance Catheter insertion note     Inserted 20 G, 8 cm long  Endurance Extended Dwell Peripheral Catheter into right upper arm using ultrasound guidance and sterile technique. Positive blood return. Patient tolerated procedure well. Denies questions or concerns at this time. The Endurance catheter is an extended dwell peripheral catheter and may remain in place 29 days. Blood samples can be obtained from this catheter. To obtain labs, a tourniquet may be used, flush with 10ml NS, waste 3ml, draw labs, flush with 20ml NS. Dressings needs to be changed with central line dressing kit using bio patch every 7 days by nurse. Primary nurse        RN notified.         [address] [facility] Vascular Access Team. PICC Nurse

## 2022-11-15 NOTE — PROGRESS NOTES
Progress Note    NAME: Mare Shultz   :  2000   MRN:  [id number]     Date/Time:  22     Assessment :    Plan:  Nephrotic syndrome  PE  Left renal vein thrombus  HTN  Obesity  Fever, tachycardia  HTN  Mild hypokalemia, hyponatremia  Mild thrombocytopenia Albumin 1.0 upc 5.6 grams, creatinine 0.8    Neg urine culture/ BC from       HCV neg ; neg strept in Oct '22    Pending broad serologic workup:? Membronous nephropathy  MANUEL, myeloma, ANCA, anti-pla2r, HBV, HIV, APLS     Pt has essentially been febrile since yesterday-can't recommend renal biopsy under these condidtions. D/W heme  WOrk up --bc/urine,cxr etc.    Hold on IV steroid  given fevers--Fever from RVT? RSV (-), urine/blood CX (-)      CTA/CT:  1. Acute bilateral lower lobe pulmonary emboli. 2. Patchy bilateral airspace disease and small to moderate left pleural  effusion. Mildly enlarged thoracic lymph nodes. 3. Left renal vein thrombus, with slight renal enlargement and perinephric  stranding as above. 4. Splenomegaly. No definite splenic vein thrombosis. 5. Pericholecystic fluid. Possible cholelithiasis/sludge. 6. 2.1 cm right thyroid nodule. Recommend outpatient ultrasound, possibly  warranting fine-needle aspiration.     Pt has seen  for her [facility]           Subjective:   CHIEF COMPLAINT:  anxiety over night      Past Medical History:   Diagnosis Date    Anxiety and depression       Past Surgical History:   Procedure Laterality Date    HX WISDOM TEETH EXTRACTION       Social History     Tobacco Use    Smoking status: Never    Smokeless tobacco: Never   Substance Use Topics    Alcohol use: Yes     Comment: rarely      Family History   Problem Relation Age of Onset    No Known Problems Mother     No Known Problems Father     Cancer Sister         hodgkins lymphoma    No Known Problems Brother     Lung Cancer Maternal Grandmother     Diabetes Maternal Grandmother     Hypertension Maternal Grandmother     No Known Problems Maternal Grandfather     Cancer Paternal Grandmother     No Known Problems Paternal Grandfather       No Known Allergies   Prior to Admission medications    Not on File     REVIEW OF SYSTEMS:    Sister with NHL  No n/v/cp/sob    Objective:   VITALS:    Visit Vitals  BP (!) 146/74 (BP 1 Location: Right upper arm, BP Patient Position: Sitting)   Pulse (!) 125   Temp (!) 100.6 °F (38.1 °C)   Resp 18   Ht 5' 4\" (1.626 m)   Wt 117 kg (258 lb)   SpO2 97%   BMI 44.29 kg/m²     PHYSICAL EXAM:  Gen:  []  WD []  WN  [] cachectic []  thin [x]  obese []  disheveled             []  ill apearing  []   Critical  []   Chronic    [x]  No acute distress    HEENT:   [x] NC/AT/PERRL    [] pink conjunctivae      [] pale conjunctivae                  PERRL  [] yes  [] no      [] moist mucosa    [] dry mucosa    hearing intact to voice [x] yes  [] No                 NECK:   supple [] yes  [] no        masses [] yes  [] No               thyroid  []  non tender  []  tender    RESP:   [x] CTA bilaterally/no wheezing/rhonchi/rales/crackles    [] rhonchi bilaterally - no dullness  [] wheezing   [] rhonchi   [] crackles     use of accessory muscles [] yes [x] no    CARD:   [x]  regular rate and rhythm/No murmurs/rubs/gallops    murmur  [] yes ()  [] no      Rubs  [] yes  [] no       Gallops [] yes  [] no    Rate []  regular  []  irregular        carotid bruits  [] Right  []  Left                 LE edema [] yes  [x] no           JVP  []  yes   []  no    ABD:    [x] soft/non distended/non tender/+bowel sounds/no HSM    []  Rigid    tenderness [] yes [] no   Liver enlargement  []  yes []  no                Spleen enlargement  []  yes []  no     distended []  yes [] no     bowel sound  [] hypoactive   [] hyperactive    LYMPH:    Neck []  yes []  no       Axillae []  yes []  no    SKIN:   Rashes []  yes   [x]  no    Ulcers []  yes   []  no               [] tight to palpitation    skin turgor [x]  good  [] poor  [] decreased Cyanosis/clubbing []  yes []  no    NEUR:   [x] cranial nerves II-XII grossly intact       [] Cranial nerves deficit                 []  facial droop    []  slurred speech   [] aphasic     [] Strength normal     []  weakness  []  LUE  []   RUE/ []  LLE  []   RLE    follows commands  [x]  yes []  no           PSYCH:   insight [] poor [x] good   Alert and Oriented to  [x] person  [x] place  [x]  time                    [] depressed [] anxious [] agitated  [] lethargic [] stuporous  [] sedated     LAB DATA REVIEWED:    Recent Labs     11/15/22  0307 11/14/22  0403   WBC 7.6 8.8   HGB 10.2* 10.4*   HCT 29.7* 31.0*    163       Recent Labs     11/15/22  0307 11/14/22  0403 11/13/22  0219   * 135* 135*   K 3.3* 3.5 3.3*    104 105   CO2 23 22 22   BUN 10 9 10   CREA 0.71 0.84 0.79   GLU 91 106* 101*   CA 7.0* 7.4* 7.2*       Recent Labs     11/15/22  0307 11/14/22  0403 11/13/22  0219   ALT 9* 9* 12   AP 53 62 75   TBILI 0.4 0.4 0.6   ALB 1.0* 1.0* 1.3*   GLOB 4.8* 5.2* 5.1*       No results for input(s): INR, PTP, APTT, INREXT, INREXT in the last 72 hours. No results for input(s): FE, TIBC, PSAT, FERR in the last 72 hours. No results for input(s): PH, PCO2, PO2 in the last 72 hours. No results for input(s): CPK, CKMB in the last 72 hours.     No lab exists for component: TROPONINI  No results found for: GLUCPOC    Procedures: see electronic medical records for all procedures/Xrays and details which were not copied into this note but were reviewed prior to creation of Plan.    ________________________________________________________________________       ___________________________________________________  Consulting Physician: Franco Chimera, MD

## 2022-11-15 NOTE — PROGRESS NOTES
Problem: Pulmonary Embolism Care Plan (Adult)  Goal: *Improvement of existing pulmonary embolism  Outcome: Progressing Towards Goal  Goal: *Absence of bleeding  Outcome: Progressing Towards Goal  Goal: *Labs within defined limits  Outcome: Progressing Towards Goal

## 2022-11-15 NOTE — PROGRESS NOTES
11/14/22 1945   Vitals   Temp (!) 100.8 °F (38.2 °C)   Temp Source Oral   Pulse (Heart Rate) (!) 125   Resp Rate 20   O2 Sat (%) 96 %   Level of Consciousness 0   /79   MAP (Monitor) 97   MAP (Calculated) 98   Cardiac Rhythm Sinus Tachy   MEWS Score 3   Box Number [id number]   Electrodes Replaced No   Pain 1   Pain Scale 1 Numeric (0 - 10)   Pain Intensity 1 0   Patient Stated Pain Goal 0   Pain Reassessment 1 Yes   POSS Scale   Opioid Sedation Scale 1   Oxygen Therapy   O2 Device None (Room air)   Patient Observation   Repositioned Head of bed elevated (degrees)   Patient Turned Turns self   Ambulate Ambulate in room   Activity In bed;Family/Visitors present   Mode of Transportation Wheelchair   MEWS 3 r/t temperature and HR, ice packs applied to axillary and groin, charge RN aware, will continue to monitor

## 2022-11-16 LAB
ANA SER QL: POSITIVE
ANION GAP SERPL CALC-SCNC: 11 MMOL/L (ref 5–15)
BUN SERPL-MCNC: 12 MG/DL (ref 6–20)
BUN/CREAT SERPL: 17 (ref 12–20)
C-ANCA TITR SER IF: NORMAL TITER
CALCIUM SERPL-MCNC: 7.3 MG/DL (ref 8.5–10.1)
CARDIOLIPIN IGA SER IA-ACNC: <9 APL U/ML (ref 0–11)
CARDIOLIPIN IGG SER IA-ACNC: <9 GPL U/ML (ref 0–14)
CARDIOLIPIN IGM SER IA-ACNC: 9 MPL U/ML (ref 0–12)
CENTROMERE B AB SER-ACNC: <0.2 AI (ref 0–0.9)
CHLORIDE SERPL-SCNC: 103 MMOL/L (ref 97–108)
CHROMATIN AB SERPL-ACNC: >8 AI (ref 0–0.9)
CO2 SERPL-SCNC: 20 MMOL/L (ref 21–32)
CREAT SERPL-MCNC: 0.71 MG/DL (ref 0.55–1.02)
DSDNA AB SER-ACNC: 28 IU/ML (ref 0–9)
ENA JO1 AB SER-ACNC: <0.2 AI (ref 0–0.9)
ENA RNP AB SER-ACNC: >8 AI (ref 0–0.9)
ENA SCL70 AB SER-ACNC: <0.2 AI (ref 0–0.9)
ENA SM AB SER-ACNC: >8 AI (ref 0–0.9)
ENA SS-A AB SER-ACNC: 0.5 AI (ref 0–0.9)
ENA SS-B AB SER-ACNC: <0.2 AI (ref 0–0.9)
ERYTHROCYTE [DISTWIDTH] IN BLOOD BY AUTOMATED COUNT: 13.4 % (ref 11.5–14.5)
GLUCOSE SERPL-MCNC: 90 MG/DL (ref 65–100)
HCT VFR BLD AUTO: 30.1 % (ref 35–47)
HGB BLD-MCNC: 10.1 G/DL (ref 11.5–16)
INR PPP: 1.1 (ref 0.9–1.1)
MAGNESIUM SERPL-MCNC: 1.8 MG/DL (ref 1.6–2.4)
MCH RBC QN AUTO: 28.5 PG (ref 26–34)
MCHC RBC AUTO-ENTMCNC: 33.6 G/DL (ref 30–36.5)
MCV RBC AUTO: 84.8 FL (ref 80–99)
NRBC # BLD: 0 K/UL (ref 0–0.01)
NRBC BLD-RTO: 0 PER 100 WBC
P-ANCA ATYPICAL TITR SER IF: NORMAL TITER
P-ANCA TITR SER IF: NORMAL TITER
PLATELET # BLD AUTO: 149 K/UL (ref 150–400)
POTASSIUM SERPL-SCNC: 3.6 MMOL/L (ref 3.5–5.1)
PROTHROMBIN TIME: 11.2 SEC (ref 9–11.1)
RBC # BLD AUTO: 3.55 M/UL (ref 3.8–5.2)
SEE BELOW, 164869: ABNORMAL
SODIUM SERPL-SCNC: 134 MMOL/L (ref 136–145)
WBC # BLD AUTO: 4.9 K/UL (ref 3.6–11)

## 2022-11-16 PROCEDURE — 36415 COLL VENOUS BLD VENIPUNCTURE: CPT

## 2022-11-16 PROCEDURE — 74011250637 HC RX REV CODE- 250/637: Performed by: INTERNAL MEDICINE

## 2022-11-16 PROCEDURE — 85610 PROTHROMBIN TIME: CPT

## 2022-11-16 PROCEDURE — 74011000258 HC RX REV CODE- 258: Performed by: STUDENT IN AN ORGANIZED HEALTH CARE EDUCATION/TRAINING PROGRAM

## 2022-11-16 PROCEDURE — 83735 ASSAY OF MAGNESIUM: CPT

## 2022-11-16 PROCEDURE — 80048 BASIC METABOLIC PNL TOTAL CA: CPT

## 2022-11-16 PROCEDURE — 74011250636 HC RX REV CODE- 250/636: Performed by: INTERNAL MEDICINE

## 2022-11-16 PROCEDURE — 74011250636 HC RX REV CODE- 250/636: Performed by: STUDENT IN AN ORGANIZED HEALTH CARE EDUCATION/TRAINING PROGRAM

## 2022-11-16 PROCEDURE — 74011000250 HC RX REV CODE- 250: Performed by: INTERNAL MEDICINE

## 2022-11-16 PROCEDURE — 65660000001 HC RM ICU INTERMED STEPDOWN

## 2022-11-16 PROCEDURE — 85027 COMPLETE CBC AUTOMATED: CPT

## 2022-11-16 RX ADMIN — ENOXAPARIN SODIUM 120 MG: 150 INJECTION SUBCUTANEOUS at 20:39

## 2022-11-16 RX ADMIN — ACETAMINOPHEN 650 MG: 325 TABLET ORAL at 22:22

## 2022-11-16 RX ADMIN — ACETAMINOPHEN 650 MG: 325 TABLET ORAL at 03:35

## 2022-11-16 RX ADMIN — ACETAMINOPHEN 650 MG: 325 TABLET ORAL at 15:10

## 2022-11-16 RX ADMIN — SODIUM CHLORIDE, PRESERVATIVE FREE 10 ML: 5 INJECTION INTRAVENOUS at 13:27

## 2022-11-16 RX ADMIN — AZITHROMYCIN DIHYDRATE 500 MG: 500 INJECTION, POWDER, LYOPHILIZED, FOR SOLUTION INTRAVENOUS at 18:02

## 2022-11-16 RX ADMIN — SODIUM CHLORIDE 1 G: 900 INJECTION INTRAVENOUS at 17:07

## 2022-11-16 RX ADMIN — SODIUM CHLORIDE, PRESERVATIVE FREE 10 ML: 5 INJECTION INTRAVENOUS at 05:36

## 2022-11-16 RX ADMIN — ENOXAPARIN SODIUM 120 MG: 150 INJECTION SUBCUTANEOUS at 08:40

## 2022-11-16 RX ADMIN — SODIUM CHLORIDE, PRESERVATIVE FREE 10 ML: 5 INJECTION INTRAVENOUS at 22:17

## 2022-11-16 NOTE — PROGRESS NOTES
Hospitalist Progress Note    NAME: Marie Connolly   :  2000   MRN:  [id number]            Subjective:     Chief Complaint / Reason for Physician Visit  Discussed with RN events overnight. No acute issues   Persistent low grade temp   Renal bx on hold   Review of Systems:  Symptom Y/N Comments  Symptom Y/N Comments   Fever/Chills n   Chest Pain n    Poor Appetite n   Edema n    Cough n   Abdominal Pain n    Sputum n   Joint Pain n    SOB/LUNA n   Pruritis/Rash n    Nausea/vomit n   Tolerating PT/OT     Diarrhea n   Tolerating Diet     Constipation n   Other       Could NOT obtain due to:      Objective:     VITALS:   Last 24hrs VS reviewed since prior progress note. Most recent are:  Patient Vitals for the past 24 hrs:   Temp Pulse Resp BP SpO2   22 0756 -- -- -- -- 97 %   22 0754 99.3 °F (37.4 °C) (!) 109 18 136/73 97 %   22 0539 (!) 100.6 °F (38.1 °C) -- -- -- --   22 0339 100.2 °F (37.9 °C) -- -- -- --   22 0248 (!) 101.7 °F (38.7 °C) (!) 114 18 132/82 94 %   11/15/22 2224 100.3 °F (37.9 °C) (!) 115 20 117/74 94 %   11/15/22 2100 (!) 102.8 °F (39.3 °C) -- -- -- --   11/15/22 1928 (!) 102.3 °F (39.1 °C) (!) 122 18 128/78 98 %   11/15/22 1503 (!) 100.6 °F (38.1 °C) (!) 125 18 (!) 146/74 97 %   11/15/22 1255 (!) 102.4 °F (39.1 °C) -- -- -- --   11/15/22 1133 (!) 102.3 °F (39.1 °C) (!) 109 16 107/74 96 %   11/15/22 1122 -- (!) 115 -- 107/74 --         Intake/Output Summary (Last 24 hours) at 2022 0901  Last data filed at 11/15/2022 1756  Gross per 24 hour   Intake 360 ml   Output 500 ml   Net -140 ml          PHYSICAL EXAM:  General: WD, WN. Alert, cooperative, no acute distress    EENT:  EOMI. Anicteric sclerae. MMM  Resp:  CTA bilaterally, no wheezing or rales. No accessory muscle use  CV:  Regular  rhythm,  No edema  GI:  Soft, Non distended, Non tender. +Bowel sounds  Neurologic:  Alert and oriented X 3, normal speech,   Psych:   Good insight.  Not anxious nor agitated  Skin:  No rashes. No jaundice    Procedures: see electronic medical records for all procedures/Xrays and details which were not copied into this note but were reviewed prior to creation of Plan. LABS:  I reviewed today's most current labs and imaging studies. Pertinent labs include:  Recent Labs     11/16/22  0239 11/15/22  0307 11/14/22  0403   WBC 4.9 7.6 8.8   HGB 10.1* 10.2* 10.4*   HCT 30.1* 29.7* 31.0*   * 182 163       Recent Labs     11/16/22  0339 11/15/22  0307 11/14/22  0403   * 132* 135*   K 3.6 3.3* 3.5    101 104   CO2 20* 23 22   GLU 90 91 106*   BUN 12 10 9   CREA 0.71 0.71 0.84   CA 7.3* 7.0* 7.4*   MG 1.8  --   --    ALB  --  1.0* 1.0*   TBILI  --  0.4 0.4   ALT  --  9* 9*   INR 1.1  --   --          Signed: Bon Pulliam MD        Reviewed most current lab test results and cultures  YES  Reviewed most current radiology test results   YES  Review and summation of old records today    NO  Reviewed patient's current orders and MAR    YES  PMH/SH reviewed - no change compared to H&P      Assessment / Plan:  Acute bilateral pulmonary emboli POA  Left renal vein thrombosis POA  Presents with pleuritic flank pain  LE dopplers-negative  HS troponin and pBNP-negative  COVID negative  Telemetry  Presence of left renal vein thrombosis argues for possible hypercoaguable state              Has hormonal implant for pregnancy prevention              > 300 mg protein in urine and serum albumin 1.3 raises issue of nephrotic syndrome                          Check spot urine protein/creat ratio  Consulted hematology and nephrology, expertise appreciated  Lovenox 1 mg/kg, hold for renal biopsy then transition to NOAC after renal bx. Follow up outpatient for discussion of birth control options.      SIRS POA , respiratory rate 38, temp 100.8  Acute bilateral patchy airspace disease POA questionable infection versus pulmonary infarction  Upper respiratory infection POA  Ruled out COVID-19 POA with fever, upper respiratory symptoms, new PEs, diarrhea  CT chest with bilateral PE, patchy bilateral airspace disease  Concern for underlying infection in addition to the pulmonary emboli              Fever could also be due to the blood clots  Rapid COVID-negative  Influenza A/B antigen testing negative  Respiratory PCR-negative  Procalcitonin 0.06 Argues against bacterial pneumonia  Ucx negative              Hold antibiotics  11/15-persistent fever. Some tachycardia. Repeated CXR-atelectasis vs pna vs pulmonary infarcts, repeated cultures, and viral panels. No new complaints of cough, etc. Started ceftriaxone and azithromycin. Continue to follow. 11/16: continue spiking fevers , ID consulted , bcx NTD      2.1 cm right thyroid nodule  Followed prior to admit, reports she had a biopsy that was benign several months ago  TSH 6.80, FT3 1.3, FT4 1.1  PCP follow up after acute illness     Cholelithiasis POA  Intermittent RUQ pain, currently non tender    40 or above Morbid obesity / Body mass index is 44.1 kg/m². Code status: Full  Prophylaxis: Lovenox  Recommended Disposition: Home w/Family     ________________________________________________________________________  Care Plan discussed with:    Comments   Patient x    Family      RN     Care Manager     Consultant  x Heme onc                     Multidiciplinary team rounds were held today with , nursing, pharmacist and clinical coordinator. Patient's plan of care was discussed; medications were reviewed and discharge planning was addressed.      ________________________________________________________________________  Total NON critical care TIME:  35   Minutes    Total CRITICAL CARE TIME Spent:   Minutes non procedure based      Comments   >50% of visit spent in counseling and coordination of care     ________________________________________________________________________  Pierron, MD

## 2022-11-16 NOTE — PROGRESS NOTES
Bedside shift change report given to Barbara Pinto RN by Brooks Martinez. Report included the following information SBAR, ED Summary, Intake/Output, MAR, Recent Results, and Cardiac Rhythm sinus tach .

## 2022-11-16 NOTE — PROGRESS NOTES
11/15/22 2224   Vitals   Temp 100.3 °F (37.9 °C)   Temp Source Oral   Pulse (Heart Rate) (!) 115   Resp Rate 20   O2 Sat (%) 94 %   Level of Consciousness 0   /74   MAP (Monitor) 84   MAP (Calculated) 88   Cardiac Rhythm Sinus Tachy   MEWS Score 3   MEWS 3 r/t elevated HR, charge RN aware, will continue to monitor

## 2022-11-16 NOTE — PROGRESS NOTES
Progress Note    NAME: Zach Johnson   :  2000   MRN:  [id number]     Date/Time:  22     Assessment :    Plan:  Nephrotic syndrome  PE  Left renal vein thrombus  HTN  Obesity  Fever, tachycardia  HTN  Mild hypokalemia, hyponatremia  Mild thrombocytopenia Albumin 1.0 upc 5.6 grams, creatinine normal--nephrotic range proteinuria without overt nephrotic syndrome on exam    Neg urine culture/ BC from -      HCV neg ; neg strep in Oct '22    MANUEL (+), K:L elevated in both serum/urine; however ratio normal  Lupus anticoagulant (-)  ANticardiolipin ab/ beta 2 glycoprotein (-)  Anti PLA2R (-)--this essentially rules out a primary membranous nephropathy      Pt has essentially been febrile since yesterday-can't recommend renal biopsy under these condidtions. D/W heme/pt--on broad spectrum antibiotics now  Can always do renal biopsy as an outpt. Recommend ECHO-    CTA/CT:  1. Acute bilateral lower lobe pulmonary emboli. 2. Patchy bilateral airspace disease and small to moderate left pleural  effusion. Mildly enlarged thoracic lymph nodes. 3. Left renal vein thrombus, with slight renal enlargement and perinephric  stranding as above. 4. Splenomegaly. No definite splenic vein thrombosis. 5. Pericholecystic fluid. Possible cholelithiasis/sludge. 6. 2.1 cm right thyroid nodule. Recommend outpatient ultrasound, possibly  warranting fine-needle aspiration. Pt has seen  for her MNG           Subjective:   CHIEF COMPLAINT:  I need FMLA papers filled out.       Past Medical History:   Diagnosis Date    Anxiety and depression       Past Surgical History:   Procedure Laterality Date    HX WISDOM TEETH EXTRACTION       Social History     Tobacco Use    Smoking status: Never    Smokeless tobacco: Never   Substance Use Topics    Alcohol use: Yes     Comment: rarely      Family History   Problem Relation Age of Onset    No Known Problems Mother     No Known Problems Father     Cancer Sister hodgkins lymphoma    No Known Problems Brother     Lung Cancer Maternal Grandmother     Diabetes Maternal Grandmother     Hypertension Maternal Grandmother     No Known Problems Maternal Grandfather     Cancer Paternal Grandmother     No Known Problems Paternal Grandfather       No Known Allergies   Prior to Admission medications    Not on File     REVIEW OF SYSTEMS:    Sister with NHL  No n/v/cp/sob    Objective:   VITALS:    Visit Vitals  /80 (BP 1 Location: Left upper arm, BP Patient Position: At rest)   Pulse (!) 115   Temp 99.8 °F (37.7 °C)   Resp 18   Ht 5' 4\" (1.626 m)   Wt 116.5 kg (256 lb 14.4 oz)   SpO2 97%   BMI 44.10 kg/m²     PHYSICAL EXAM:  Gen:  []  WD []  WN  [] cachectic []  thin [x]  obese []  disheveled             []  ill apearing  []   Critical  []   Chronic    [x]  No acute distress    HEENT:   [x] NC/AT/PERRL    [] pink conjunctivae      [] pale conjunctivae                  PERRL  [] yes  [] no      [] moist mucosa    [] dry mucosa    hearing intact to voice [x] yes  [] No                 NECK:   supple [] yes  [] no        masses [] yes  [] No               thyroid  []  non tender  []  tender    RESP:   [x] CTA bilaterally/no wheezing/rhonchi/rales/crackles    [] rhonchi bilaterally - no dullness  [] wheezing   [] rhonchi   [] crackles     use of accessory muscles [] yes [x] no    CARD:   [x]  regular rate and rhythm/No murmurs/rubs/gallops    murmur  [] yes ()  [] no      Rubs  [] yes  [] no       Gallops [] yes  [] no    Rate []  regular  []  irregular        carotid bruits  [] Right  []  Left                 LE edema [] yes  [x] no           JVP  []  yes   []  no    ABD:    [x] soft/non distended/non tender/+bowel sounds/no HSM    []  Rigid    tenderness [] yes [] no   Liver enlargement  []  yes []  no                Spleen enlargement  []  yes []  no     distended []  yes [] no     bowel sound  [] hypoactive   [] hyperactive    LYMPH:    Neck []  yes []  no       Axillae [] yes []  no    SKIN:   Rashes []  yes   [x]  no    Ulcers []  yes   []  no               [] tight to palpitation    skin turgor [x]  good  [] poor  [] decreased               Cyanosis/clubbing []  yes []  no    NEUR:   [x] cranial nerves II-XII grossly intact       [] Cranial nerves deficit                 []  facial droop    []  slurred speech   [] aphasic     [] Strength normal     []  weakness  []  LUE  []   RUE/ []  LLE  []   RLE    follows commands  [x]  yes []  no           PSYCH:   insight [] poor [x] good   Alert and Oriented to  [x] person  [x] place  [x]  time                    [] depressed [] anxious [] agitated  [] lethargic [] stuporous  [] sedated     LAB DATA REVIEWED:    Recent Labs     11/16/22  0239 11/15/22  0307   WBC 4.9 7.6   HGB 10.1* 10.2*   HCT 30.1* 29.7*   * 182       Recent Labs     11/16/22  0339 11/15/22  0307 11/14/22  0403   * 132* 135*   K 3.6 3.3* 3.5    101 104   CO2 20* 23 22   BUN 12 10 9   CREA 0.71 0.71 0.84   GLU 90 91 106*   CA 7.3* 7.0* 7.4*   MG 1.8  --   --        Recent Labs     11/15/22  0307 11/14/22  0403   ALT 9* 9*   AP 53 62   TBILI 0.4 0.4   ALB 1.0* 1.0*   GLOB 4.8* 5.2*       Recent Labs     11/16/22 0339   INR 1.1   PTP 11.2*        No results for input(s): FE, TIBC, PSAT, FERR in the last 72 hours. No results for input(s): PH, PCO2, PO2 in the last 72 hours. No results for input(s): CPK, CKMB in the last 72 hours.     No lab exists for component: TROPONINI  No results found for: GLUCPOC    Procedures: see electronic medical records for all procedures/Xrays and details which were not copied into this note but were reviewed prior to creation of Plan.    ________________________________________________________________________       ___________________________________________________  Consulting Physician: Randolph Mercado MD

## 2022-11-16 NOTE — PROGRESS NOTES
11/15/22 1928   Vitals   Temp (!) 102.3 °F (39.1 °C)   Temp Source Oral   Pulse (Heart Rate) (!) 122   Heart Rate Source Monitor   Resp Rate 18   O2 Sat (%) 98 %   Level of Consciousness 0   /78   MAP (Monitor) 90   MAP (Calculated) 95   Cardiac Rhythm Sinus Tachy   MEWS Score 5   Pain 1   Pain Scale 1 Numeric (0 - 10)   Pain Intensity 1 0   Patient Stated Pain Goal 0   Pain Reassessment 1 Yes   POSS Scale   Opioid Sedation Scale 1   Oxygen Therapy   O2 Device None (Room air)   Patient Observation   Repositioned Head of bed elevated (degrees)   Patient Turned Turns self   Ambulate Ambulate in room   Activity In bed;Watching t.v.   MEWS 5 r/t elevated HR and temp, PRN tylenol given, charge RN aware, will reassess

## 2022-11-16 NOTE — PROGRESS NOTES
Problem: Falls - Risk of  Goal: *Absence of Falls  Description: Document Cherylle Cockayne Fall Risk and appropriate interventions in the flowsheet.   Outcome: Progressing Towards Goal  Note: Fall Risk Interventions:            Medication Interventions: Teach patient to arise slowly         History of Falls Interventions: Bed/chair exit alarm

## 2022-11-16 NOTE — PROGRESS NOTES
Hematology Oncology Progress Note       Follow up for: bilateral PE and renal vein thrombosis. Chart notes reviewed since last visit. Case discussed with following: patient. Patient complains of the following:  feeling better today. Denies RUQ pain. Pain at present in back. Cough is productive. Additional concerns noted by the staff:     Patient Vitals for the past 24 hrs:   BP Temp Pulse Resp SpO2 Weight   11/16/22 0756 -- -- -- -- 97 % --   11/16/22 0754 136/73 99.3 °F (37.4 °C) (!) 109 18 97 % --   11/16/22 0539 -- (!) 100.6 °F (38.1 °C) -- -- -- 116.5 kg (256 lb 14.4 oz)   11/16/22 0339 -- 100.2 °F (37.9 °C) -- -- -- --   11/16/22 0248 132/82 (!) 101.7 °F (38.7 °C) (!) 114 18 94 % --   11/15/22 2224 117/74 100.3 °F (37.9 °C) (!) 115 20 94 % --   11/15/22 2100 -- (!) 102.8 °F (39.3 °C) -- -- -- --   11/15/22 1928 128/78 (!) 102.3 °F (39.1 °C) (!) 122 18 98 % --   11/15/22 1503 (!) 146/74 (!) 100.6 °F (38.1 °C) (!) 125 18 97 % --   11/15/22 1255 -- (!) 102.4 °F (39.1 °C) -- -- -- --   11/15/22 1133 107/74 (!) 102.3 °F (39.1 °C) (!) 109 16 96 % --   11/15/22 1122 107/74 -- (!) 115 -- -- --         Review of Systems: negative for 11 organ systems except as noted. Physical Examination:  Constitutional Alert, cooperative, oriented. Mood and affect appropriate. Appears close to chronological age. Well nourished. Well developed. Seems    Head Normocephalic; no scars   Eyes Conjunctivae and sclerae are clear and without icterus. Pupils are round   ENMT Sinuses are nontender. No oral exudates, ulcers, masses, thrush or mucositis. Oropharynx clear. Tongue normal.   Neck Supple without masses or thyromegaly. No jugular venous distension. Hematologic/Lymphatic No petechiae or purpura. No tender or palpable lymph nodes appreciated   Respiratory Lungs are clear to auscultation without rhonchi or wheezing. Cardiovascular Regular rate and rhythm of heart without murmurs, gallops or rubs.    Chest / Line Site Chest is symmetric with no chest wall deformities. Abdomen Non-tender, non-distended, no masses, ascites or hepatosplenomegaly. Good bowel sounds. Musculoskeletal No tenderness or swelling, normal range of motion without obvious weakness. Extremities No visible deformities, no cyanosis, clubbing or edema. Skin No rashes, scars, or lesions suggestive of malignancy. No petechiae, purpura, or ecchymoses. No excoriations. Neurologic No sensory or motor deficits noted but not specifically tested   Psychiatric Alert and oriented. Coherent speech. Verbalizes understanding of our discussions today.        Labs:  Recent Results (from the past 24 hour(s))   COVID-19 RAPID TEST    Collection Time: 11/15/22  4:07 PM   Result Value Ref Range    Specimen source Nasopharyngeal      COVID-19 rapid test Not detected NOTD     CULTURE, BLOOD, PAIRED    Collection Time: 11/15/22  5:44 PM    Specimen: Blood   Result Value Ref Range    Special Requests: NO SPECIAL REQUESTS      Culture result: NO GROWTH AFTER 13 HOURS     INFLUENZA A+B VIRAL AGS    Collection Time: 11/15/22  6:59 PM   Result Value Ref Range    Influenza A Antigen Negative NEG      Influenza B Antigen Negative NEG     CBC W/O DIFF    Collection Time: 11/16/22  2:39 AM   Result Value Ref Range    WBC 4.9 3.6 - 11.0 K/uL    RBC 3.55 (L) 3.80 - 5.20 M/uL    HGB 10.1 (L) 11.5 - 16.0 g/dL    HCT 30.1 (L) 35.0 - 47.0 %    MCV 84.8 80.0 - 99.0 FL    MCH 28.5 26.0 - 34.0 PG    MCHC 33.6 30.0 - 36.5 g/dL    RDW 13.4 11.5 - 14.5 %    PLATELET 611 (L) 322 - 400 K/uL    NRBC 0.0 0  WBC    ABSOLUTE NRBC 0.00 0.00 - 5.72 K/uL   METABOLIC PANEL, BASIC    Collection Time: 11/16/22  3:39 AM   Result Value Ref Range    Sodium 134 (L) 136 - 145 mmol/L    Potassium 3.6 3.5 - 5.1 mmol/L    Chloride 103 97 - 108 mmol/L    CO2 20 (L) 21 - 32 mmol/L    Anion gap 11 5 - 15 mmol/L    Glucose 90 65 - 100 mg/dL    BUN 12 6 - 20 MG/DL    Creatinine 0.71 0.55 - 1.02 MG/DL    BUN/Creatinine ratio 17 12 - 20      eGFR >60 >60 ml/min/1.73m2    Calcium 7.3 (L) 8.5 - 10.1 MG/DL   PROTHROMBIN TIME + INR    Collection Time: 11/16/22  3:39 AM   Result Value Ref Range    INR 1.1 0.9 - 1.1      Prothrombin time 11.2 (H) 9.0 - 11.1 sec   MAGNESIUM    Collection Time: 11/16/22  3:39 AM   Result Value Ref Range    Magnesium 1.8 1.6 - 2.4 mg/dL       Imaging:  CTA chest, abd and pelvis 11/12/22  THYROID: 2.1 cm right thyroid nodule. Small left thyroid nodules. MEDIASTINUM/SOLANGE: Mildly enlarged bilateral axillary lymph nodes, 1.2-1.3 cm. Right paratracheal lymph nodes at the 1.1 cm. HEART/PERICARDIUM: Unremarkable. AORTA: No aneurysm. PULMONARY ARTERIES: Suboptimal contrast opacification of the pulmonary arterial  circuit, though there are bilateral lower lobe pulmonary emboli. LUNGS/PLEURA: Patchy airspace disease in the bilateral lower lobes and lingula. Small to moderate left pleural effusion. No pneumothorax. BONES: No destructive bone lesion. ADDITIONAL COMMENTS: N/A     LIVER: No mass or biliary dilatation. GALLBLADDER: Pericholecystic fluid. Possible stones/sludge within the  gallbladder. SPLEEN: Enlarged, 16.5 cm in craniocaudal dimension. No definite splenic vein  thrombus. PANCREAS: No mass or ductal dilatation. ADRENALS: No mass. KIDNEYS: Mild asymmetric enlargement of the left kidney relative to the right,  with mild left perinephric stranding. No hydronephrosis or nephrolithiasis. Subtle filling defects in the left renal vein  GI TRACT: No bowel obstruction or wall thickening  PERITONEUM: No free air or free fluid. APPENDIX: Unremarkable. [employer] RETROPERITONEUM: No lymphadenopathy or aortic aneurysm. URINARY BLADDER: No mass or calculus. LYMPH NODES: Prominent bilateral external iliac chain and inguinal lymph nodes. REPRODUCTIVE ORGANS: Unremarkable. FREE FLUID: None. BONES: No destructive bone lesion. IMPRESSION     1.  Acute bilateral lower lobe pulmonary emboli. 2. Patchy bilateral airspace disease and small to moderate left pleural  effusion. Mildly enlarged thoracic lymph nodes. 3. Left renal vein thrombus, with slight renal enlargement and perinephric  stranding as above. 4. Splenomegaly. No definite splenic vein thrombosis. 5. Pericholecystic fluid. Possible cholelithiasis/sludge. 6. 2.1 cm right thyroid nodule. Recommend outpatient ultrasound, possibly  warranting fine-needle aspiration. Dopplers lower extremities 11/12/22     No evidence of acute deep vein thrombosis in the right common femoral, femoral, popliteal, posterior tibial, and peroneal veins. The veins were imaged in the transverse and longitudinal planes. The vessels showed normal color filling and compressibility. Doppler interrogation showed phasic and spontaneous flow. No evidence of deep vein thrombosis in the right lower extremity. No evidence of acute deep vein thrombosis in the left common femoral, femoral, popliteal, posterior tibial, and peroneal veins. The veins were imaged in the transverse and longitudinal planes. The vessels showed normal color filling and compressibility. Doppler interrogation showed phasic and spontaneous flow. No evidence of deep vein thrombosis in the left lower extremity. Assessment and Plan:   hypercoagulable state manifested by renal vein thrombosis and bilateral pulmonary emboli - Renal vein thrombosis is commonly associated with nephrotic syndrome. Some have hypothesized that the urinary losses of antithrombin III or that a decrease in protein S due to urinary loss or excess binding to increased levels of [id number] protein may be contributing factors. Antiphospholipid antibody syndrome has been documented in about 20% of recurrent renal vein thrombosis in young to middle aged adults. The classic triad of acute flank pain, hematuria,, and sudden deterioration in renal function is seen in 10-20% of cases.  More commonly, one sees a more chronic forms usually manifested by slowly worsening renal function, progressive proteinuria, and edema. Anticoagulant or thrombolytic therapy should be considered quickly in those developing symptoms acutely. The role of long term anticoagulation is still somewhat conroversial but may be efficacious in prevention of recurrent thrombosis in the renal vein or somewhere else. Some authors have used the degree of hypoalbuminemia in making a recommendation, indicating that a serum albumin < 2.5 is a risk factor for recurrence. Given that she has had bilateral PE as well, this issue is moot and she will need to be on anticoaulgation. Fortunately her weight and creatinine are within acceptable range for a DOAC. I ordered the more common screening studies for VTE that have a chance of being accurate acute with an event. Acutely protein C, S, ATIII will be impacted by consumption during clot, while paradoxically sometimes going up as an acute phase reactant. Given her young age, these ideally would be checked but as noted earlier, you can lose these as part of the nephrotic syndrome. Await results. Her anticardiolipin antibody, beta 2 glycoprotein and lupus anticoagulant testing is negative. The factor V Leiden and prothrombin mutation testing are pending. She has an implanted etonogestrel, which is a progestogen, and this may have increased her risk of VTE a bit. I have contacted [facility] and talked to one of their MDs about reviewing her method of birth control. Patient is comfortable with using an alternative method of birth control. That MD suggested that pt followup with Dr. Calvin Mendez as outpatient to discuss potentially removing it. Fevers - urine and blood cultures pending from 11/15. Urine culture from 11/12 negative. Blood culture 11/12 no growth at 4 days. RSV, influenza A and B negative on 11/12 and 1115. Hepatitis testing negative.  Chest Xray raised concern of bibasilar atelectasis vs PNA vs pulmonary infarcts. Left pleural effusion with followup Xray recommended in 8-10 weeks to ensure resolution. Her CT scan raised the question of pericholecystic fluid, possible cholelithiasis and sludge which might prove to another possible source of her fever. With lack of pain and GI symptoms,  however, this seems less likely. If nothing pans out, with productive cough and CXray findings, pneumonia +/- pulmonary infarction are the leading contenders for cause of fever. Currently on rocephin and azithromycin.           Trudy Cano MD [facility] office  [address] [facility], [facility] [address]  Phone [phone]  Fax [phone]

## 2022-11-16 NOTE — PROGRESS NOTES
1500: Bedside and Verbal shift change report given to [facility] (oncoming nurse) by Zac Fernando (offgoing nurse). Report included the following information SBAR, Kardex, Intake/Output, MAR, and Recent Results. 1510: Patients temp 102. 2. PRN tylenol given as well as ice packs. 1900 adEnd of Shift Note    Bedside shift change report given to RN (oncoming nurse) by Sondra Severin, RN (offgoing nurse). Report included the following information SBAR, Kardex, Intake/Output, MAR, and Recent Results    Shift worked:  7a-7p     Shift summary and any significant changes:     ID consulted. Patient expressed pattern with the fevers. It seems to occur when she first wakes up. Concerns for physician to address:        Zone phone for oncoming shift:           Activity:  Activity Level: Up ad leticia, Bath Room Privileges  Number times ambulated in hallways past shift: 0  Number of times OOB to chair past shift: 3    Cardiac:   Cardiac Monitoring: Yes      Cardiac Rhythm: Sinus Tachy    Access:  Current line(s): PIV     Genitourinary:   Urinary status: voiding    Respiratory:   O2 Device: None (Room air)  Chronic home O2 use?: NO  Incentive spirometer at bedside: NO       GI:  Last Bowel Movement Date: 11/16/22  Current diet:  ADULT DIET Regular  Passing flatus: YES  Tolerating current diet: NO       Pain Management:   Patient states pain is manageable on current regimen: YES    Skin:  Gaurav Score: 22  Interventions: increase time out of bed    Patient Safety:  Fall Score:  Total Score: 1  Interventions: gripper socks       Length of Stay:  Expected LOS: 2d 14h  Actual LOS: [address], RN

## 2022-11-16 NOTE — PROGRESS NOTES
adEnd of Shift Note    Bedside shift change report given to Laci Armstrong (oncoming nurse) by Susan Koenig RN (offgoing nurse).   Report included the following information SBAR, Kardex, ED Summary, Intake/Output, MAR, and Recent Results    Shift worked:  night     Shift summary and any significant changes:     Pt remains febrile overnight     Concerns for physician to address:  Persistent fevers     Zone phone for oncoming shift:   xxx       Susan Koenig RN

## 2022-11-16 NOTE — PROGRESS NOTES
11/16/22 0248   Vitals   Temp (!) 101.7 °F (38.7 °C)   Temp Source Oral   Pulse (Heart Rate) (!) 114   Resp Rate 18   O2 Sat (%) 94 %   Level of Consciousness 0   /82   MAP (Monitor) 96   MAP (Calculated) 99   Cardiac Rhythm Sinus Tachy   MEWS Score 5   Pain 1   Pain Scale 1 Numeric (0 - 10)   Pain Intensity 1 0   Patient Stated Pain Goal 0   Pain Reassessment 1 Yes   POSS Scale   Opioid Sedation Scale 1   Oxygen Therapy   Pulse via Oximetry 115 beats per minute   O2 Device None (Room air)   Patient Observation   Repositioned Head of bed elevated (degrees)   Patient Turned Turns self   Ambulate Ambulate in room   Activity In bed;Resting quietly   MEWS 3 r/t elevated HR and temp, PRN tylenol given, charge RN aware, will reassess

## 2022-11-16 NOTE — PROGRESS NOTES
Problem: Falls - Risk of  Goal: *Absence of Falls  Description: Document Christopher Dawn Fall Risk and appropriate interventions in the flowsheet.   Outcome: Progressing Towards Goal  Note: Fall Risk Interventions:            Medication Interventions: Patient to call before getting OOB, Teach patient to arise slowly         History of Falls Interventions: Bed/chair exit alarm, Consult care management for discharge planning, Investigate reason for fall         Problem: Pulmonary Embolism Care Plan (Adult)  Goal: *Improvement of existing pulmonary embolism  Outcome: Progressing Towards Goal  Goal: *Absence of bleeding  Outcome: Progressing Towards Goal  Goal: *Labs within defined limits  Outcome: Progressing Towards Goal

## 2022-11-16 NOTE — PROGRESS NOTES
adEnd of Shift Note    Bedside shift change report given to Sandei Orozco RN by Radha Villanueva RN (offgoing nurse). Report included the following information SBAR, Kardex, ED Summary, Intake/Output, MAR, Recent Results, and Cardiac Rhythm sinus tach    Shift worked:  7798-9087     Shift summary and any significant changes:     none     Concerns for physician to address:  none     Zone phone for oncoming shift:   [phone]       Activity:  Activity Level: Bath Room Privileges, Up ad leticia  Number times ambulated in hallways past shift: 0  Number of times OOB to chair past shift: 2    Cardiac:   Cardiac Monitoring: Yes      Cardiac Rhythm: Sinus Tachy    Access:  Current line(s): PIV     Genitourinary:   Urinary status: voiding    Respiratory:   O2 Device: None (Room air)  Chronic home O2 use?: NO  Incentive spirometer at bedside: NO       GI:  Last Bowel Movement Date: 11/16/22  Current diet:  ADULT DIET Regular  Passing flatus: YES  Tolerating current diet: YES       Pain Management:   Patient states pain is manageable on current regimen: YES    Skin:  Gaurav Score: 22  Interventions: increase time out of bed    Patient Safety:  Fall Score:  Total Score: 1  Interventions: gripper socks       Length of Stay:  Expected LOS: 2d 14h  Actual LOS: [address], RN

## 2022-11-17 LAB
ALBUMIN 24H MFR UR ELPH: 57.7 %
ALPHA1 GLOB 24H MFR UR ELPH: 17.6 %
ALPHA2 GLOB 24H MFR UR ELPH: 6.9 %
ANION GAP SERPL CALC-SCNC: 9 MMOL/L (ref 5–15)
B-GLOBULIN MFR UR ELPH: 8.1 %
BASOPHILS # BLD: 0 K/UL (ref 0–0.1)
BASOPHILS NFR BLD: 0 % (ref 0–1)
BUN SERPL-MCNC: 9 MG/DL (ref 6–20)
BUN/CREAT SERPL: 15 (ref 12–20)
CALCIUM SERPL-MCNC: 7.4 MG/DL (ref 8.5–10.1)
CHLORIDE SERPL-SCNC: 104 MMOL/L (ref 97–108)
CO2 SERPL-SCNC: 22 MMOL/L (ref 21–32)
COLLECT DURATION TIME UR: 24 HR
CREAT SERPL-MCNC: 0.6 MG/DL (ref 0.55–1.02)
DIFFERENTIAL METHOD BLD: ABNORMAL
EOSINOPHIL # BLD: 0 K/UL (ref 0–0.4)
EOSINOPHIL NFR BLD: 0 % (ref 0–7)
ERYTHROCYTE [DISTWIDTH] IN BLOOD BY AUTOMATED COUNT: 12.8 % (ref 11.5–14.5)
GAMMA GLOB 24H MFR UR ELPH: 9.7 %
GLUCOSE SERPL-MCNC: 88 MG/DL (ref 65–100)
HCT VFR BLD AUTO: 25.4 % (ref 35–47)
HGB BLD-MCNC: 8.3 G/DL (ref 11.5–16)
IMM GRANULOCYTES # BLD AUTO: 0 K/UL (ref 0–0.04)
IMM GRANULOCYTES NFR BLD AUTO: 1 % (ref 0–0.5)
INTERPRETATION UR IFE-IMP: ABNORMAL
LYMPHOCYTES # BLD: 0.9 K/UL (ref 0.8–3.5)
LYMPHOCYTES NFR BLD: 21 % (ref 12–49)
M PROTEIN 24H MFR UR ELPH: ABNORMAL %
MCH RBC QN AUTO: 27.4 PG (ref 26–34)
MCHC RBC AUTO-ENTMCNC: 32.7 G/DL (ref 30–36.5)
MCV RBC AUTO: 83.8 FL (ref 80–99)
MONOCYTES # BLD: 0.3 K/UL (ref 0–1)
MONOCYTES NFR BLD: 7 % (ref 5–13)
NEUTS SEG # BLD: 3.1 K/UL (ref 1.8–8)
NEUTS SEG NFR BLD: 71 % (ref 32–75)
NOTE, 149533: ABNORMAL
NRBC # BLD: 0 K/UL (ref 0–0.01)
NRBC BLD-RTO: 0 PER 100 WBC
PLATELET # BLD AUTO: 216 K/UL (ref 150–400)
PMV BLD AUTO: 12 FL (ref 8.9–12.9)
POTASSIUM SERPL-SCNC: 3.5 MMOL/L (ref 3.5–5.1)
PROCALCITONIN SERPL-MCNC: 0.89 NG/ML
PROT 24H UR-MRATE: ABNORMAL MG/24 HR (ref 30–150)
PROT UR-MCNC: 1949.7 MG/DL
RBC # BLD AUTO: 3.03 M/UL (ref 3.8–5.2)
SODIUM SERPL-SCNC: 135 MMOL/L (ref 136–145)
SPECIMEN VOL ?TM UR: 600 ML
WBC # BLD AUTO: 4.4 K/UL (ref 3.6–11)

## 2022-11-17 PROCEDURE — 74011000250 HC RX REV CODE- 250: Performed by: INTERNAL MEDICINE

## 2022-11-17 PROCEDURE — 74011000258 HC RX REV CODE- 258: Performed by: INTERNAL MEDICINE

## 2022-11-17 PROCEDURE — 74011250636 HC RX REV CODE- 250/636: Performed by: INTERNAL MEDICINE

## 2022-11-17 PROCEDURE — 80048 BASIC METABOLIC PNL TOTAL CA: CPT

## 2022-11-17 PROCEDURE — 84145 PROCALCITONIN (PCT): CPT

## 2022-11-17 PROCEDURE — 65660000001 HC RM ICU INTERMED STEPDOWN

## 2022-11-17 PROCEDURE — 74011250637 HC RX REV CODE- 250/637: Performed by: INTERNAL MEDICINE

## 2022-11-17 PROCEDURE — 85025 COMPLETE CBC W/AUTO DIFF WBC: CPT

## 2022-11-17 PROCEDURE — 74011250636 HC RX REV CODE- 250/636: Performed by: STUDENT IN AN ORGANIZED HEALTH CARE EDUCATION/TRAINING PROGRAM

## 2022-11-17 PROCEDURE — 36415 COLL VENOUS BLD VENIPUNCTURE: CPT

## 2022-11-17 RX ORDER — POTASSIUM CHLORIDE 750 MG/1
20 TABLET, FILM COATED, EXTENDED RELEASE ORAL
Status: COMPLETED | OUTPATIENT
Start: 2022-11-17 | End: 2022-11-17

## 2022-11-17 RX ADMIN — CEFEPIME 2 G: 2 INJECTION, POWDER, FOR SOLUTION INTRAVENOUS at 10:04

## 2022-11-17 RX ADMIN — SODIUM CHLORIDE, PRESERVATIVE FREE 10 ML: 5 INJECTION INTRAVENOUS at 07:01

## 2022-11-17 RX ADMIN — AZITHROMYCIN DIHYDRATE 500 MG: 500 INJECTION, POWDER, LYOPHILIZED, FOR SOLUTION INTRAVENOUS at 18:14

## 2022-11-17 RX ADMIN — SODIUM CHLORIDE, PRESERVATIVE FREE 10 ML: 5 INJECTION INTRAVENOUS at 14:12

## 2022-11-17 RX ADMIN — ENOXAPARIN SODIUM 120 MG: 150 INJECTION SUBCUTANEOUS at 20:36

## 2022-11-17 RX ADMIN — SODIUM CHLORIDE, PRESERVATIVE FREE 10 ML: 5 INJECTION INTRAVENOUS at 21:42

## 2022-11-17 RX ADMIN — POTASSIUM CHLORIDE 20 MEQ: 750 TABLET, FILM COATED, EXTENDED RELEASE ORAL at 14:12

## 2022-11-17 RX ADMIN — ENOXAPARIN SODIUM 120 MG: 150 INJECTION SUBCUTANEOUS at 08:23

## 2022-11-17 RX ADMIN — CEFEPIME 2 G: 2 INJECTION, POWDER, FOR SOLUTION INTRAVENOUS at 17:01

## 2022-11-17 NOTE — PROGRESS NOTES
0700: adEnd of Shift Note    Bedside shift change report given to [place], RN (oncoming nurse) by Noel Escamilla (offgoing nurse). Report included the following information SBAR, Kardex, Intake/Output, MAR, Recent Results, and Cardiac Rhythm ST    Shift worked:  9340-0094     Shift summary and any significant changes:     No events     Concerns for physician to address:       Zone phone for oncoming shift:          Activity:  Activity Level: Up ad leticia  Number times ambulated in hallways past shift: 0  Number of times OOB to chair past shift: 0    Cardiac:   Cardiac Monitoring: Yes      Cardiac Rhythm: Sinus Tachy    Access:  Current line(s): PIV     Genitourinary:   Urinary status: voiding    Respiratory:   O2 Device: None (Room air)  Chronic home O2 use?: NO  Incentive spirometer at bedside: NO       GI:  Last Bowel Movement Date: 11/16/22  Current diet:  ADULT DIET Regular  Passing flatus: YES  Tolerating current diet: YES       Pain Management:   Patient states pain is manageable on current regimen: YES    Skin:  Gaurav Score: 22  Interventions: increase time out of bed    Patient Safety:  Fall Score:  Total Score: 1  Interventions: bed/chair alarm, gripper socks, and pt to call before getting OOB       Length of Stay:  Expected LOS: 2d 14h  Actual LOS: [address]

## 2022-11-17 NOTE — PROGRESS NOTES
0720:  Bedside shift change report given to [address] (oncoming nurse) by Rema Haywood (offgoing nurse). Report included the following information SBAR, Kardex, MAR, and Cardiac Rhythm NSR/ST . adEnd of Shift Note    Bedside shift change report given to [facility] (oncoming nurse) by Tracey Rosado RN (offgoing nurse). Report included the following information SBAR, Kardex, MAR, and Cardiac Rhythm NSR/ST    Shift worked:  7a-3p     Shift summary and any significant changes:    Rheumatology consult placed, but have not seen patient yet, patient still tachy on monitor, mild bloody sputum resolving but still present     Concerns for physician to address:       Zone phone for oncoming shift:       Activity:  Activity Level: Up ad leticia  Number times ambulated in hallways past shift: 0  Number of times OOB to chair past shift: 1    Cardiac:   Cardiac Monitoring: Yes      Cardiac Rhythm: Sinus Tachy    Access:  Current line(s): PIV     Genitourinary:   Urinary status: voiding    Respiratory:   O2 Device: None (Room air)  Chronic home O2 use?: NO  Incentive spirometer at bedside: NO       GI:  Last Bowel Movement Date: 11/16/22  Current diet:  ADULT DIET Regular  Passing flatus: YES  Tolerating current diet: YES       Pain Management:   Patient states pain is manageable on current regimen: YES    Skin:  Gaurav Score: 23  Interventions: increase time out of bed    Patient Safety:  Fall Score:  Total Score: 1  Interventions: bed/chair alarm       Length of Stay:  Expected LOS: 2d 14h  Actual LOS: [address] RN

## 2022-11-17 NOTE — PROGRESS NOTES
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  can't recommend renal biopsy under these condidtions. D/W heme/pt--on broad spectrum antibiotics now  Can always do renal biopsy as an outpt. Recommend rheum consult. Recommend ECHO-    CTA/CT:  1. Acute bilateral lower lobe pulmonary emboli. 2. Patchy bilateral airspace disease and small to moderate left pleural  effusion. Mildly enlarged thoracic lymph nodes. 3. Left renal vein thrombus, with slight renal enlargement and perinephric  stranding as above. 4. Splenomegaly. No definite splenic vein thrombosis. 5. Pericholecystic fluid. Possible cholelithiasis/sludge. 6. 2.1 cm right thyroid nodule. Recommend outpatient ultrasound, possibly  warranting fine-needle aspiration. Pt has seen  for her [facility]           Subjective:   CHIEF COMPLAINT:  I need FMLA papers filled out.       Past Medical History:   Diagnosis Date    Anxiety and depression       Past Surgical History:   Procedure Laterality Date    HX WISDOM TEETH EXTRACTION       Social History     Tobacco Use    Smoking status: Never    Smokeless tobacco: Never   Substance Use Topics    Alcohol use: Yes     Comment: rarely      Family History   Problem Relation Age of Onset    No Known Problems Mother     No Known Problems Father     Cancer Sister         hodgkins lymphoma    No Known Problems Brother     Lung Cancer Maternal Grandmother     Diabetes Maternal Grandmother     Hypertension Maternal Grandmother     No Known Problems Maternal Grandfather     Cancer Paternal Grandmother     No Known Problems Paternal Grandfather       No Known Allergies   Prior to Admission medications    Not on File     REVIEW OF SYSTEMS:    Sister with NHL  No n/v/cp/sob    Objective:   VITALS:    Visit Vitals  /61   Pulse (!) 110   Temp 99.4 °F (37.4 °C)   Resp 17   Ht 5' 4\" (1.626 m)   Wt 116.5 kg (256 lb 14.4 oz)   SpO2 96%   BMI 44.10 kg/m²     PHYSICAL EXAM:  Gen:  []  WD []  WN  [] cachectic []  thin [x]  obese []  disheveled             []  ill apearing  []   Critical  []   Chronic    [x]  No acute distress    HEENT:   [x] NC/AT/PERRL    [] pink conjunctivae      [] pale conjunctivae                  PERRL  [] yes  [] no      [] moist mucosa    [] dry mucosa    hearing intact to voice [x] yes  [] No                 NECK:   supple [] yes  [] no        masses [] yes  [] No               thyroid  []  non tender  []  tender    RESP:   [x] CTA bilaterally/no wheezing/rhonchi/rales/crackles    [] rhonchi bilaterally - no dullness  [] wheezing   [] rhonchi   [] crackles     use of accessory muscles [] yes [x] no    CARD:   [x]  regular rate and rhythm/No murmurs/rubs/gallops    murmur  [] yes ()  [] no      Rubs  [] yes  [] no       Gallops [] yes  [] no    Rate []  regular  []  irregular        carotid bruits  [] Right  []  Left                 LE edema [] yes  [x] no           JVP  []  yes   []  no    ABD:    [x] soft/non distended/non tender/+bowel sounds/no HSM    []  Rigid    tenderness [] yes [] no   Liver enlargement  []  yes []  no                Spleen enlargement  []  yes []  no     distended []  yes [] no     bowel sound  [] hypoactive   [] hyperactive    LYMPH:    Neck []  yes [] no       Axillae []  yes []  no    SKIN:   Rashes []  yes   [x]  no    Ulcers []  yes   []  no               [] tight to palpitation    skin turgor [x]  good  [] poor  [] decreased               Cyanosis/clubbing []  yes []  no    NEUR:   [x] cranial nerves II-XII grossly intact       [] Cranial nerves deficit                 []  facial droop    []  slurred speech   [] aphasic     [] Strength normal     []  weakness  []  LUE  []   RUE/ []  LLE  []   RLE    follows commands  [x]  yes []  no           PSYCH:   insight [] poor [x] good   Alert and Oriented to  [x] person  [x] place  [x]  time                    [] depressed [] anxious [] agitated  [] lethargic [] stuporous  [] sedated     LAB DATA REVIEWED:    Recent Labs     11/17/22 0540 11/16/22 0239   WBC 4.4 4.9   HGB 8.3* 10.1*   HCT 25.4* 30.1*    149*       Recent Labs     11/17/22  0540 11/16/22  0339 11/15/22  0307   * 134* 132*   K 3.5 3.6 3.3*    103 101   CO2 22 20* 23   BUN 9 12 10   CREA 0.60 0.71 0.71   GLU 88 90 91   CA 7.4* 7.3* 7.0*   MG  --  1.8  --        Recent Labs     11/15/22  0307   ALT 9*   AP 53   TBILI 0.4   ALB 1.0*   GLOB 4.8*       Recent Labs     11/16/22 0339   INR 1.1   PTP 11.2*        No results for input(s): FE, TIBC, PSAT, FERR in the last 72 hours. No results for input(s): PH, PCO2, PO2 in the last 72 hours. No results for input(s): CPK, CKMB in the last 72 hours.     No lab exists for component: TROPONINI  No results found for: GLUCPOC    Procedures: see electronic medical records for all procedures/Xrays and details which were not copied into this note but were reviewed prior to creation of Plan.    ________________________________________________________________________       ___________________________________________________  Consulting Physician: Wendy Hamilton MD

## 2022-11-17 NOTE — PROGRESS NOTES
Hospitalist Progress Note    NAME: Arabella Cornejo   :  2000   MRN:  [id number]            Subjective:     Chief Complaint / Reason for Physician Visit  Discussed with RN events overnight. No acute issues   Afebrile today     Review of Systems:  Symptom Y/N Comments  Symptom Y/N Comments   Fever/Chills n   Chest Pain n    Poor Appetite n   Edema n    Cough n   Abdominal Pain n    Sputum n   Joint Pain n    SOB/LUNA n   Pruritis/Rash n    Nausea/vomit n   Tolerating PT/OT     Diarrhea n   Tolerating Diet     Constipation n   Other       Could NOT obtain due to:      Objective:     VITALS:   Last 24hrs VS reviewed since prior progress note. Most recent are:  Patient Vitals for the past 24 hrs:   Temp Pulse Resp BP SpO2   22 0733 99.6 °F (37.6 °C) (!) 109 18 (!) 143/78 99 %   22 0330 99.3 °F (37.4 °C) (!) 102 17 121/82 98 %   22 2243 99.7 °F (37.6 °C) (!) 112 16 128/77 96 %   22 1950 99.1 °F (37.3 °C) (!) 108 18 (!) 142/91 96 %   22 1549 99.2 °F (37.3 °C) -- -- -- --   22 1503 (!) 102.2 °F (39 °C) (!) 118 18 131/78 95 %   22 1057 99.8 °F (37.7 °C) (!) 115 18 132/80 97 %         Intake/Output Summary (Last 24 hours) at 2022 0920  Last data filed at 2022 0733  Gross per 24 hour   Intake 450 ml   Output 0 ml   Net 450 ml          PHYSICAL EXAM:  General: WD, WN. Alert, cooperative, no acute distress    EENT:  EOMI. Anicteric sclerae. MMM  Resp:  CTA bilaterally, no wheezing or rales. No accessory muscle use  CV:  Regular  rhythm,  No edema  GI:  Soft, Non distended, Non tender. +Bowel sounds  Neurologic:  Alert and oriented X 3, normal speech,   Psych:   Good insight. Not anxious nor agitated  Skin:  No rashes. No jaundice    Procedures: see electronic medical records for all procedures/Xrays and details which were not copied into this note but were reviewed prior to creation of Plan.       LABS:  I reviewed today's most current labs and imaging studies. Pertinent labs include:  Recent Labs     11/17/22  0540 11/16/22  0239 11/15/22  0307   WBC 4.4 4.9 7.6   HGB 8.3* 10.1* 10.2*   HCT 25.4* 30.1* 29.7*    149* 182       Recent Labs     11/17/22  0540 11/16/22  0339 11/15/22  0307   * 134* 132*   K 3.5 3.6 3.3*    103 101   CO2 22 20* 23   GLU 88 90 91   BUN 9 12 10   CREA 0.60 0.71 0.71   CA 7.4* 7.3* 7.0*   MG  --  1.8  --    ALB  --   --  1.0*   TBILI  --   --  0.4   ALT  --   --  9*   INR  --  1.1  --          Signed: Vira Sifuentes MD        Reviewed most current lab test results and cultures  YES  Reviewed most current radiology test results   YES  Review and summation of old records today    NO  Reviewed patient's current orders and MAR    YES  PMH/SH reviewed - no change compared to H&P      Assessment / Plan:  Acute bilateral pulmonary emboli POA  Left renal vein thrombosis POA  Persistent fevers   Concern for Lupus   Presents with pleuritic flank pain  LE dopplers-negative  HS troponin and pBNP-negative  COVID negative  Telemetry  Presence of left renal vein thrombosis argues for possible hypercoaguable state              Has hormonal implant for pregnancy prevention              > 300 mg protein in urine and serum albumin 1.3 raises issue of nephrotic syndrome                          Check spot urine protein/creat ratio  Consulted hematology and nephrology, expertise appreciated  Lovenox 1 mg/kg, hold for renal biopsy then transition to NOAC after renal bx. Follow up outpatient for discussion of birth control options.      Acute on chronic anemia   Hb dropped down to 8 from 10g   Monitor , will check iron profile and stool occult blood   SIRS POA , respiratory rate 38, temp 100.8  Acute bilateral patchy airspace disease POA questionable infection versus pulmonary infarction  Upper respiratory infection POA  Ruled out COVID-19 POA with fever, upper respiratory symptoms, new PEs, diarrhea  CT chest with bilateral PE, patchy bilateral airspace disease  Concern for underlying infection in addition to the pulmonary emboli              Fever could also be due to the blood clots  Rapid COVID-negative  Influenza A/B antigen testing negative  Respiratory PCR-negative  Procalcitonin 0.06 Argues against bacterial pneumonia  Ucx negative              Hold antibiotics  11/15-persistent fever. Some tachycardia. Repeated CXR-atelectasis vs pna vs pulmonary infarcts, repeated cultures, and viral panels. No new complaints of cough, etc. Started ceftriaxone and azithromycin. Continue to follow. 11/16: continue spiking fevers , ID consulted , bcx NTD   11/17: AFebrile this morning, reviewed lab work, MANUEL/anti DNA + , concern for lupus   Rheum consulted . Renal bx still on hold due to persistent fevers      2.1 cm right thyroid nodule  Followed prior to admit, reports she had a biopsy that was benign several months ago  TSH 6.80, FT3 1.3, FT4 1.1  PCP follow up after acute illness     Cholelithiasis POA  Intermittent RUQ pain, currently non tender    40 or above Morbid obesity / Body mass index is 44.1 kg/m². Code status: Full  Prophylaxis: Lovenox  Recommended Disposition: Home w/Family     ________________________________________________________________________  Care Plan discussed with:    Comments   Patient x    Family      RN     Care Manager     Consultant  x nephro                     Multidiciplinary team rounds were held today with , nursing, pharmacist and clinical coordinator. Patient's plan of care was discussed; medications were reviewed and discharge planning was addressed.      ________________________________________________________________________  Total NON critical care TIME:  35   Minutes    Total CRITICAL CARE TIME Spent:   Minutes non procedure based      Comments   >50% of visit spent in counseling and coordination of care     ________________________________________________________________________  Georgean Sour Virgie Mohamud MD

## 2022-11-17 NOTE — PROGRESS NOTES
Renal-anti DS DNA/Anti Sm (+) I think this could be sle. She would benefit from a  rheum consult. Renal Bx on hold until afebrile. But  membranous lupus nephritis? ? Liam Robertson MD

## 2022-11-17 NOTE — PROGRESS NOTES
Problem: Falls - Risk of  Goal: *Absence of Falls  Description: Document Anders Cuevas Fall Risk and appropriate interventions in the flowsheet.   Outcome: Progressing Towards Goal  Note: Fall Risk Interventions:            Medication Interventions: Patient to call before getting OOB, Teach patient to arise slowly         History of Falls Interventions: Bed/chair exit alarm         Problem: Pulmonary Embolism Care Plan (Adult)  Goal: *Improvement of existing pulmonary embolism  Outcome: Progressing Towards Goal

## 2022-11-17 NOTE — PROGRESS NOTES
1700 pt walking around room when RN entered, vitals completed by RN, pt had low grade fever (just under parameter for tylenol), pt not complaining of pain, medication given and dinner set up for pt, will continue to monitor pt. adEnd of Shift Note    Bedside shift change report given to Adele Cavanaugh RN (oncoming nurse) by Vernell Sanchez RN (offgoing nurse). Report included the following information SBAR, Kardex, Intake/Output, and MAR    Shift worked:  4p-7p     Shift summary and any significant changes:     Elevated temp     Concerns for physician to address:       Zone phone for oncoming shift:   N/a       Activity:  Activity Level: Up ad leticia  Number times ambulated in hallways past shift: 0  Number of times OOB to chair past shift: 3    Cardiac:   Cardiac Monitoring: Yes      Cardiac Rhythm: Sinus Tachy    Access:  Current line(s): PIV     Genitourinary:   Urinary status: voiding    Respiratory:   O2 Device: None (Room air)  Chronic home O2 use?: YES  Incentive spirometer at bedside: NO       GI:  Last Bowel Movement Date: 11/16/22  Current diet:  ADULT DIET Regular  Passing flatus: YES  Tolerating current diet: YES       Pain Management:   Patient states pain is manageable on current regimen: YES    Skin:  Gaurav Score: 22  Interventions: increase time out of bed    Patient Safety:  Fall Score:  Total Score: 1  Interventions: gripper socks       Length of Stay:  Expected LOS: 2d 14h  Actual LOS: [address], RN

## 2022-11-17 NOTE — PROGRESS NOTES
Hematology Oncology Progress Note       Follow up for: bilateral PE and renal vein thrombosis. Chart notes reviewed since last visit. Case discussed with following: patient. Boyfriend at bedside. Dr. Mercedes Mullen    Patient complains of the following:  fevers improving a bit. Feeling better. Thinks fevers tie win with her waking up. Additional concerns noted by the staff:     Patient Vitals for the past 24 hrs:   BP Temp Pulse Resp SpO2   11/17/22 0733 (!) 143/78 99.6 °F (37.6 °C) (!) 109 18 99 %   11/17/22 0330 121/82 99.3 °F (37.4 °C) (!) 102 17 98 %   11/16/22 2243 128/77 99.7 °F (37.6 °C) (!) 112 16 96 %   11/16/22 1950 (!) 142/91 99.1 °F (37.3 °C) (!) 108 18 96 %   11/16/22 1549 -- 99.2 °F (37.3 °C) -- -- --   11/16/22 1503 131/78 (!) 102.2 °F (39 °C) (!) 118 18 95 %         Review of Systems: negative for 11 organ systems except as noted. Physical Examination:  Constitutional Alert, cooperative, oriented. Mood and affect appropriate. Appears close to chronological age. Well nourished. Well developed. Seems    Head Normocephalic; no scars   Eyes Conjunctivae and sclerae are clear and without icterus. Pupils are round   ENMT Sinuses are nontender. No oral exudates, ulcers, masses, thrush or mucositis. Oropharynx clear. Tongue normal.   Neck Supple without masses or thyromegaly. No jugular venous distension. Hematologic/Lymphatic No petechiae or purpura. No tender or palpable lymph nodes appreciated   Respiratory Lungs are clear to auscultation without rhonchi or wheezing. Cardiovascular Regular rate and rhythm of heart without murmurs, gallops or rubs. Chest / Line Site Chest is symmetric with no chest wall deformities. Abdomen Non-tender, non-distended, no masses, ascites or hepatosplenomegaly. Good bowel sounds. Musculoskeletal No tenderness or swelling, normal range of motion without obvious weakness. Extremities No visible deformities, no cyanosis, clubbing or edema.     Skin No rashes, scars, or lesions suggestive of malignancy. No petechiae, purpura, or ecchymoses. No excoriations. Neurologic No sensory or motor deficits noted but not specifically tested   Psychiatric Alert and oriented. Coherent speech. Verbalizes understanding of our discussions today. Labs:  Recent Results (from the past 24 hour(s))   PROCALCITONIN    Collection Time: 11/17/22  5:40 AM   Result Value Ref Range    Procalcitonin 0.89 ng/mL   CBC WITH AUTOMATED DIFF    Collection Time: 11/17/22  5:40 AM   Result Value Ref Range    WBC 4.4 3.6 - 11.0 K/uL    RBC 3.03 (L) 3.80 - 5.20 M/uL    HGB 8.3 (L) 11.5 - 16.0 g/dL    HCT 25.4 (L) 35.0 - 47.0 %    MCV 83.8 80.0 - 99.0 FL    MCH 27.4 26.0 - 34.0 PG    MCHC 32.7 30.0 - 36.5 g/dL    RDW 12.8 11.5 - 14.5 %    PLATELET 301 703 - 960 K/uL    MPV 12.0 8.9 - 12.9 FL    NRBC 0.0 0  WBC    ABSOLUTE NRBC 0.00 0.00 - 0.01 K/uL    NEUTROPHILS 71 32 - 75 %    LYMPHOCYTES 21 12 - 49 %    MONOCYTES 7 5 - 13 %    EOSINOPHILS 0 0 - 7 %    BASOPHILS 0 0 - 1 %    IMMATURE GRANULOCYTES 1 (H) 0.0 - 0.5 %    ABS. NEUTROPHILS 3.1 1.8 - 8.0 K/UL    ABS. LYMPHOCYTES 0.9 0.8 - 3.5 K/UL    ABS. MONOCYTES 0.3 0.0 - 1.0 K/UL    ABS. EOSINOPHILS 0.0 0.0 - 0.4 K/UL    ABS. BASOPHILS 0.0 0.0 - 0.1 K/UL    ABS. IMM. GRANS. 0.0 0.00 - 0.04 K/UL    DF AUTOMATED     METABOLIC PANEL, BASIC    Collection Time: 11/17/22  5:40 AM   Result Value Ref Range    Sodium 135 (L) 136 - 145 mmol/L    Potassium 3.5 3.5 - 5.1 mmol/L    Chloride 104 97 - 108 mmol/L    CO2 22 21 - 32 mmol/L    Anion gap 9 5 - 15 mmol/L    Glucose 88 65 - 100 mg/dL    BUN 9 6 - 20 MG/DL    Creatinine 0.60 0.55 - 1.02 MG/DL    BUN/Creatinine ratio 15 12 - 20      eGFR >60 >60 ml/min/1.73m2    Calcium 7.4 (L) 8.5 - 10.1 MG/DL       Imaging:  CTA chest, abd and pelvis 11/12/22  THYROID: 2.1 cm right thyroid nodule. Small left thyroid nodules.   MEDIASTINUM/SOLANGE: Mildly enlarged bilateral axillary lymph nodes, 1.2-1.3 cm.  Right paratracheal lymph nodes at the 1.1 cm. HEART/PERICARDIUM: Unremarkable. AORTA: No aneurysm. PULMONARY ARTERIES: Suboptimal contrast opacification of the pulmonary arterial  circuit, though there are bilateral lower lobe pulmonary emboli. LUNGS/PLEURA: Patchy airspace disease in the bilateral lower lobes and lingula. Small to moderate left pleural effusion. No pneumothorax. BONES: No destructive bone lesion. ADDITIONAL COMMENTS: N/A     LIVER: No mass or biliary dilatation. GALLBLADDER: Pericholecystic fluid. Possible stones/sludge within the  gallbladder. SPLEEN: Enlarged, 16.5 cm in craniocaudal dimension. No definite splenic vein  thrombus. PANCREAS: No mass or ductal dilatation. ADRENALS: No mass. KIDNEYS: Mild asymmetric enlargement of the left kidney relative to the right,  with mild left perinephric stranding. No hydronephrosis or nephrolithiasis. Subtle filling defects in the left renal vein  GI TRACT: No bowel obstruction or wall thickening  PERITONEUM: No free air or free fluid. APPENDIX: Unremarkable. Margurette Chapin RETROPERITONEUM: No lymphadenopathy or aortic aneurysm. URINARY BLADDER: No mass or calculus. LYMPH NODES: Prominent bilateral external iliac chain and inguinal lymph nodes. REPRODUCTIVE ORGANS: Unremarkable. FREE FLUID: None. BONES: No destructive bone lesion. IMPRESSION     1. Acute bilateral lower lobe pulmonary emboli. 2. Patchy bilateral airspace disease and small to moderate left pleural  effusion. Mildly enlarged thoracic lymph nodes. 3. Left renal vein thrombus, with slight renal enlargement and perinephric  stranding as above. 4. Splenomegaly. No definite splenic vein thrombosis. 5. Pericholecystic fluid. Possible cholelithiasis/sludge. 6. 2.1 cm right thyroid nodule. Recommend outpatient ultrasound, possibly  warranting fine-needle aspiration.      Dopplers lower extremities 11/12/22     No evidence of acute deep vein thrombosis in the right common femoral, femoral, popliteal, posterior tibial, and peroneal veins. The veins were imaged in the transverse and longitudinal planes. The vessels showed normal color filling and compressibility. Doppler interrogation showed phasic and spontaneous flow. No evidence of deep vein thrombosis in the right lower extremity. No evidence of acute deep vein thrombosis in the left common femoral, femoral, popliteal, posterior tibial, and peroneal veins. The veins were imaged in the transverse and longitudinal planes. The vessels showed normal color filling and compressibility. Doppler interrogation showed phasic and spontaneous flow. No evidence of deep vein thrombosis in the left lower extremity. Assessment and Plan:   hypercoagulable state manifested by renal vein thrombosis and bilateral pulmonary emboli - Renal vein thrombosis is commonly associated with nephrotic syndrome. Some have hypothesized that the urinary losses of antithrombin III or that a decrease in protein S due to urinary loss or excess binding to increased levels of [id number] protein may be contributing factors. Antiphospholipid antibody syndrome has been documented in about 20% of recurrent renal vein thrombosis in young to middle aged adults. The classic triad of acute flank pain, hematuria,, and sudden deterioration in renal function is seen in 10-20% of cases. More commonly, one sees a more chronic forms usually manifested by slowly worsening renal function, progressive proteinuria, and edema. Given her renal vein thrombosis and bilateral PE,  she will need to be on anticoagulation for an extended period. Fortunately her weight and creatinine are within acceptable range for a DOAC. I ordered the more common screening studies for VTE that have a chance of being accurate acute with an event. Acutely protein C, S, ATIII will be impacted by consumption during clot, while paradoxically sometimes going up as an acute phase reactant.  Given her young age, these ideally would be checked but as noted earlier, you can lose these as part of the nephrotic syndrome. Her anticardiolipin antibody panel is negative, beta 2 glycoprotein antibodies are negative and lupus anticoagulant testing is negative, ruling out antiphospholipid antibody syndrome. The factor V Leiden and prothrombin mutation testing are pending. Her gammopathy panel reveals an M spike of 0.2 grams/dL (do not see that ESPERANZA has been ordered on serum, so will order). Her FLC ratio is normal so not a monoclonal process, making myeloma ulikley and favoring that the M protein is an MGUS.     + dxDNA - raises question of SLE. Rheumatology being consulted. She has an implanted etonogestrel, which is a progestogen, and this may have increased her risk of VTE a bit. I have contacted [address] [place] [address] and talked to one of their MDs about reviewing her method of birth control. Patient is comfortable with using an alternative method of birth control. That MD suggested that pt followup with Dr. Inna Hoover as outpatient to discuss potentially removing it. Fevers - urine and blood cultures pending from 11/15. Urine culture from 11/12 negative. Blood culture 11/12 no growth at 4 days. RSV, influenza A and B negative on 11/12 and 1115. Hepatitis testing negative. Chest Xray raised concern of bibasilar atelectasis vs PNA vs pulmonary infarcts. Left pleural effusion with followup Xray recommended in 8-10 weeks to ensure resolution. Her CT scan raised the question of pericholecystic fluid, possible cholelithiasis and sludge which might prove to another possible source of her fever. With lack of pain and GI symptoms,  however, this seems less likely. If nothing pans out, with productive cough and CXray findings, pneumonia +/- pulmonary infarction are the leading contenders for cause of fever. Now that there is a supsicion of lupus, could vasculitis be causing her fever?   Currently on rocephin and azithromycin.           Garima Lopez MD [facility] office  [address]  [place], [address]  Phone [phone]  Fax [phone]

## 2022-11-17 NOTE — PROGRESS NOTES
Problem: Falls - Risk of  Goal: *Absence of Falls  Description: Document Jose Blight Fall Risk and appropriate interventions in the flowsheet.   Outcome: Progressing Towards Goal  Note: Fall Risk Interventions:            Medication Interventions: Bed/chair exit alarm, Evaluate medications/consider consulting pharmacy, Patient to call before getting OOB         History of Falls Interventions: Bed/chair exit alarm, Consult care management for discharge planning, Door open when patient unattended         Problem: Patient Education: Go to Patient Education Activity  Goal: Patient/Family Education  Outcome: Progressing Towards Goal     Problem: Pulmonary Embolism Care Plan (Adult)  Goal: *Improvement of existing pulmonary embolism  Outcome: Progressing Towards Goal  Goal: *Absence of bleeding  Outcome: Progressing Towards Goal  Goal: *Labs within defined limits  Outcome: Progressing Towards Goal     Problem: Patient Education: Go to Patient Education Activity  Goal: Patient/Family Education  Outcome: Progressing Towards Goal

## 2022-11-18 LAB
ALBUMIN SERPL-MCNC: 1 G/DL (ref 3.5–5)
ALBUMIN/GLOB SERPL: 0.2 {RATIO} (ref 1.1–2.2)
ALP SERPL-CCNC: 52 U/L (ref 45–117)
ALT SERPL-CCNC: 10 U/L (ref 12–78)
ANION GAP SERPL CALC-SCNC: 9 MMOL/L (ref 5–15)
AST SERPL-CCNC: 38 U/L (ref 15–37)
BACTERIA SPEC CULT: NORMAL
BACTERIA SPEC CULT: NORMAL
BASOPHILS # BLD: 0 K/UL (ref 0–0.1)
BASOPHILS NFR BLD: 0 % (ref 0–1)
BILIRUB SERPL-MCNC: 0.4 MG/DL (ref 0.2–1)
BUN SERPL-MCNC: 8 MG/DL (ref 6–20)
BUN/CREAT SERPL: 12 (ref 12–20)
CALCIUM SERPL-MCNC: 7.6 MG/DL (ref 8.5–10.1)
CC UR VC: NORMAL
CHLORIDE SERPL-SCNC: 104 MMOL/L (ref 97–108)
CO2 SERPL-SCNC: 22 MMOL/L (ref 21–32)
CREAT SERPL-MCNC: 0.67 MG/DL (ref 0.55–1.02)
DIFFERENTIAL METHOD BLD: ABNORMAL
EOSINOPHIL # BLD: 0 K/UL (ref 0–0.4)
EOSINOPHIL NFR BLD: 0 % (ref 0–7)
ERYTHROCYTE [DISTWIDTH] IN BLOOD BY AUTOMATED COUNT: 13 % (ref 11.5–14.5)
FERRITIN SERPL-MCNC: 717 NG/ML (ref 26–388)
GLOBULIN SER CALC-MCNC: 4.9 G/DL (ref 2–4)
GLUCOSE SERPL-MCNC: 87 MG/DL (ref 65–100)
HCT VFR BLD AUTO: 20.8 % (ref 35–47)
HCT VFR BLD AUTO: 29.3 % (ref 35–47)
HEMOCCULT STL QL: NEGATIVE
HGB BLD-MCNC: 6.8 G/DL (ref 11.5–16)
HGB BLD-MCNC: 9.9 G/DL (ref 11.5–16)
HISTORY CHECKED?,CKHIST: NORMAL
IMM GRANULOCYTES # BLD AUTO: 0.1 K/UL (ref 0–0.04)
IMM GRANULOCYTES NFR BLD AUTO: 1 % (ref 0–0.5)
IRON SATN MFR SERPL: 14 % (ref 20–50)
IRON SERPL-MCNC: 17 UG/DL (ref 35–150)
LYMPHOCYTES # BLD: 1.3 K/UL (ref 0.8–3.5)
LYMPHOCYTES NFR BLD: 26 % (ref 12–49)
MCH RBC QN AUTO: 27.8 PG (ref 26–34)
MCHC RBC AUTO-ENTMCNC: 32.7 G/DL (ref 30–36.5)
MCV RBC AUTO: 84.9 FL (ref 80–99)
MONOCYTES # BLD: 0.4 K/UL (ref 0–1)
MONOCYTES NFR BLD: 7 % (ref 5–13)
NEUTS SEG # BLD: 3.2 K/UL (ref 1.8–8)
NEUTS SEG NFR BLD: 66 % (ref 32–75)
NRBC # BLD: 0 K/UL (ref 0–0.01)
NRBC BLD-RTO: 0 PER 100 WBC
PHOSPHATE SERPL-MCNC: 2.5 MG/DL (ref 2.6–4.7)
PLATELET # BLD AUTO: 287 K/UL (ref 150–400)
PMV BLD AUTO: 11.8 FL (ref 8.9–12.9)
POTASSIUM SERPL-SCNC: 3.4 MMOL/L (ref 3.5–5.1)
PROT SERPL-MCNC: 5.9 G/DL (ref 6.4–8.2)
RBC # BLD AUTO: 2.45 M/UL (ref 3.8–5.2)
RBC MORPH BLD: ABNORMAL
SERVICE CMNT-IMP: NORMAL
SERVICE CMNT-IMP: NORMAL
SODIUM SERPL-SCNC: 135 MMOL/L (ref 136–145)
TIBC SERPL-MCNC: 122 UG/DL (ref 250–450)
WBC # BLD AUTO: 5 K/UL (ref 3.6–11)

## 2022-11-18 PROCEDURE — 86037 ANCA TITER EACH ANTIBODY: CPT

## 2022-11-18 PROCEDURE — 65660000001 HC RM ICU INTERMED STEPDOWN

## 2022-11-18 PROCEDURE — 74011000258 HC RX REV CODE- 258: Performed by: INTERNAL MEDICINE

## 2022-11-18 PROCEDURE — 82784 ASSAY IGA/IGD/IGG/IGM EACH: CPT

## 2022-11-18 PROCEDURE — 85018 HEMOGLOBIN: CPT

## 2022-11-18 PROCEDURE — 86900 BLOOD TYPING SEROLOGIC ABO: CPT

## 2022-11-18 PROCEDURE — 85025 COMPLETE CBC W/AUTO DIFF WBC: CPT

## 2022-11-18 PROCEDURE — 86160 COMPLEMENT ANTIGEN: CPT

## 2022-11-18 PROCEDURE — 36430 TRANSFUSION BLD/BLD COMPNT: CPT

## 2022-11-18 PROCEDURE — 86923 COMPATIBILITY TEST ELECTRIC: CPT

## 2022-11-18 PROCEDURE — 84100 ASSAY OF PHOSPHORUS: CPT

## 2022-11-18 PROCEDURE — 80053 COMPREHEN METABOLIC PANEL: CPT

## 2022-11-18 PROCEDURE — 74011250637 HC RX REV CODE- 250/637: Performed by: INTERNAL MEDICINE

## 2022-11-18 PROCEDURE — 82728 ASSAY OF FERRITIN: CPT

## 2022-11-18 PROCEDURE — 82272 OCCULT BLD FECES 1-3 TESTS: CPT

## 2022-11-18 PROCEDURE — 86038 ANTINUCLEAR ANTIBODIES: CPT

## 2022-11-18 PROCEDURE — 83540 ASSAY OF IRON: CPT

## 2022-11-18 PROCEDURE — 74011250636 HC RX REV CODE- 250/636: Performed by: INTERNAL MEDICINE

## 2022-11-18 PROCEDURE — 74011000250 HC RX REV CODE- 250: Performed by: INTERNAL MEDICINE

## 2022-11-18 PROCEDURE — P9016 RBC LEUKOCYTES REDUCED: HCPCS

## 2022-11-18 PROCEDURE — 36415 COLL VENOUS BLD VENIPUNCTURE: CPT

## 2022-11-18 RX ORDER — POTASSIUM CHLORIDE 750 MG/1
20 TABLET, FILM COATED, EXTENDED RELEASE ORAL
Status: COMPLETED | OUTPATIENT
Start: 2022-11-18 | End: 2022-11-18

## 2022-11-18 RX ORDER — POTASSIUM CHLORIDE 750 MG/1
40 TABLET, FILM COATED, EXTENDED RELEASE ORAL
Status: COMPLETED | OUTPATIENT
Start: 2022-11-18 | End: 2022-11-18

## 2022-11-18 RX ORDER — SODIUM CHLORIDE 9 MG/ML
250 INJECTION, SOLUTION INTRAVENOUS AS NEEDED
Status: DISCONTINUED | OUTPATIENT
Start: 2022-11-18 | End: 2022-11-20

## 2022-11-18 RX ADMIN — SODIUM CHLORIDE, PRESERVATIVE FREE 10 ML: 5 INJECTION INTRAVENOUS at 06:22

## 2022-11-18 RX ADMIN — CEFEPIME 2 G: 2 INJECTION, POWDER, FOR SOLUTION INTRAVENOUS at 03:21

## 2022-11-18 RX ADMIN — ENOXAPARIN SODIUM 120 MG: 150 INJECTION SUBCUTANEOUS at 20:41

## 2022-11-18 RX ADMIN — POTASSIUM CHLORIDE 20 MEQ: 750 TABLET, FILM COATED, EXTENDED RELEASE ORAL at 08:34

## 2022-11-18 RX ADMIN — ENOXAPARIN SODIUM 120 MG: 150 INJECTION SUBCUTANEOUS at 08:36

## 2022-11-18 RX ADMIN — CEFEPIME 2 G: 2 INJECTION, POWDER, FOR SOLUTION INTRAVENOUS at 17:30

## 2022-11-18 RX ADMIN — CEFEPIME 2 G: 2 INJECTION, POWDER, FOR SOLUTION INTRAVENOUS at 10:04

## 2022-11-18 RX ADMIN — SODIUM CHLORIDE, PRESERVATIVE FREE 10 ML: 5 INJECTION INTRAVENOUS at 21:31

## 2022-11-18 RX ADMIN — POTASSIUM CHLORIDE 40 MEQ: 750 TABLET, FILM COATED, EXTENDED RELEASE ORAL at 11:18

## 2022-11-18 RX ADMIN — AZITHROMYCIN DIHYDRATE 500 MG: 500 INJECTION, POWDER, LYOPHILIZED, FOR SOLUTION INTRAVENOUS at 22:14

## 2022-11-18 NOTE — CONSULTS
Rheumatology Consult    Subjective: Carl Simms is a 20 y.o. female admitted for fevers, a new dx of  PEs, renal vein thrombosis and nephrotic range proteinuria. Her Chest CT is notable for b/l PEs, patchy airspace disease and a pleural effusion. She is being anticoagulated and tx with antibiotics for possible PNA given the patchy airspace disease and fevers. Her  Last fever was 11/16 at 102F and she reports that she is feeling great this morning and feels like she is back to her usual state of health. Her MANUEL direct is positive. Sm >8, RNP>8, and dsDNA is 28 and other labs are notable for proteinuria and microscopic hematuria . She denies having any major illnesses in the past and denies hx of alopecia, oral ulcers, photosensitivity or sx of inflammatory arthritis. She is not aware of any FH of Lupus or RA. Hematology -Onc is following. LAC negative, cardiolipin neg, beta 2 glycoprotein neg. Nephrology is following as well who is recommending a Renal biopsy.      Past Medical History:   Diagnosis Date    Anxiety and depression       Past Surgical History:   Procedure Laterality Date    HX WISDOM TEETH EXTRACTION       Family History   Problem Relation Age of Onset    No Known Problems Mother     No Known Problems Father     Cancer Sister         hodgkins lymphoma    No Known Problems Brother     Lung Cancer Maternal Grandmother     Diabetes Maternal Grandmother     Hypertension Maternal Grandmother     No Known Problems Maternal Grandfather     Cancer Paternal Grandmother     No Known Problems Paternal Grandfather       Social History     Tobacco Use    Smoking status: Never    Smokeless tobacco: Never   Substance Use Topics    Alcohol use: Yes     Comment: rarely       Current Facility-Administered Medications   Medication Dose Route Frequency    0.9% sodium chloride infusion 250 mL  250 mL IntraVENous PRN    cefepime (MAXIPIME) 2 g in 0.9% sodium chloride (MBP/ADV) 100 mL MBP  2 g IntraVENous Q8H azithromycin (ZITHROMAX) 500 mg in 0.9% sodium chloride 250 mL ([id number])  500 mg IntraVENous Q24H    hydrOXYzine HCL (ATARAX) tablet 25 mg  25 mg Oral TID PRN    enoxaparin (LOVENOX) injection 120 mg  1 mg/kg SubCUTAneous Q12H    sodium chloride (NS) flush 5-40 mL  5-40 mL IntraVENous Q8H    sodium chloride (NS) flush 5-40 mL  5-40 mL IntraVENous PRN    acetaminophen (TYLENOL) tablet 650 mg  650 mg Oral Q6H PRN    Or    acetaminophen (TYLENOL) suppository 650 mg  650 mg Rectal Q6H PRN    polyethylene glycol (MIRALAX) packet 17 g  17 g Oral DAILY    bisacodyL (DULCOLAX) tablet 5 mg  5 mg Oral DAILY PRN    promethazine (PHENERGAN) tablet 12.5 mg  12.5 mg Oral Q6H PRN    Or    ondansetron (ZOFRAN) injection 4 mg  4 mg IntraVENous Q6H PRN    oxyCODONE IR (ROXICODONE) tablet 5 mg  5 mg Oral Q6H PRN    melatonin tablet 3 mg  3 mg Oral QHS PRN    hydrALAZINE (APRESOLINE) 20 mg/mL injection 20 mg  20 mg IntraVENous Q6H PRN      No Known Allergies     Review of Systems:  A comprehensive review of systems was negative except for those noted below:  Fever which has no resolve  Flank pain which has now resolved      Objective:       T 98. 9. last fever was on 11/16.    Sats 98% RA    Physical Exam:   Gen  - alert, NAD  Skin - no rashes  Neck - no cervical Lymphadenopathy  MSK - no join tenderness or synovitis  Neuro - UE and LE strength is 5/5 and symmetric      Data Review:   Recent Results (from the past 12 hour(s))   METABOLIC PANEL, COMPREHENSIVE    Collection Time: 11/18/22  3:19 AM   Result Value Ref Range    Sodium 135 (L) 136 - 145 mmol/L    Potassium 3.4 (L) 3.5 - 5.1 mmol/L    Chloride 104 97 - 108 mmol/L    CO2 22 21 - 32 mmol/L    Anion gap 9 5 - 15 mmol/L    Glucose 87 65 - 100 mg/dL    BUN 8 6 - 20 MG/DL    Creatinine 0.67 0.55 - 1.02 MG/DL    BUN/Creatinine ratio 12 12 - 20      eGFR >60 >60 ml/min/1.73m2    Calcium 7.6 (L) 8.5 - 10.1 MG/DL    Bilirubin, total 0.4 0.2 - 1.0 MG/DL    ALT (SGPT) 10 (L) 12 - 78 U/L    AST (SGOT) 38 (H) 15 - 37 U/L    Alk. phosphatase 52 45 - 117 U/L    Protein, total 5.9 (L) 6.4 - 8.2 g/dL    Albumin 1.0 (L) 3.5 - 5.0 g/dL    Globulin 4.9 (H) 2.0 - 4.0 g/dL    A-G Ratio 0.2 (L) 1.1 - 2.2     PHOSPHORUS    Collection Time: 11/18/22  3:19 AM   Result Value Ref Range    Phosphorus 2.5 (L) 2.6 - 4.7 MG/DL   FERRITIN    Collection Time: 11/18/22  3:19 AM   Result Value Ref Range    Ferritin 717 (H) 26 - 388 NG/ML   CBC WITH AUTOMATED DIFF    Collection Time: 11/18/22  3:19 AM   Result Value Ref Range    WBC 5.0 3.6 - 11.0 K/uL    RBC 2.45 (L) 3.80 - 5.20 M/uL    HGB 6.8 (L) 11.5 - 16.0 g/dL    HCT 20.8 (L) 35.0 - 47.0 %    MCV 84.9 80.0 - 99.0 FL    MCH 27.8 26.0 - 34.0 PG    MCHC 32.7 30.0 - 36.5 g/dL    RDW 13.0 11.5 - 14.5 %    PLATELET 793 006 - 819 K/uL    MPV 11.8 8.9 - 12.9 FL    NRBC 0.0 0  WBC    ABSOLUTE NRBC 0.00 0.00 - 0.01 K/uL    NEUTROPHILS 66 32 - 75 %    LYMPHOCYTES 26 12 - 49 %    MONOCYTES 7 5 - 13 %    EOSINOPHILS 0 0 - 7 %    BASOPHILS 0 0 - 1 %    IMMATURE GRANULOCYTES 1 (H) 0.0 - 0.5 %    ABS. NEUTROPHILS 3.2 1.8 - 8.0 K/UL    ABS. LYMPHOCYTES 1.3 0.8 - 3.5 K/UL    ABS. MONOCYTES 0.4 0.0 - 1.0 K/UL    ABS. EOSINOPHILS 0.0 0.0 - 0.4 K/UL    ABS. BASOPHILS 0.0 0.0 - 0.1 K/UL    ABS. IMM. GRANS. 0.1 (H) 0.00 - 0.04 K/UL    DF SMEAR SCANNED      RBC COMMENTS NORMOCYTIC, NORMOCHROMIC     IRON PROFILE    Collection Time: 11/18/22  3:19 AM   Result Value Ref Range    Iron 17 (L) 35 - 150 ug/dL    TIBC 122 (L) 250 - 450 ug/dL    Iron % saturation 14 (L) 20 - 50 %   OCCULT BLOOD, STOOL    Collection Time: 11/18/22  7:46 AM   Result Value Ref Range    Occult blood, stool Negative NEG     RBC, ALLOCATE    Collection Time: 11/18/22  8:15 AM   Result Value Ref Range    HISTORY CHECKED?  Historical check performed    TYPE & SCREEN    Collection Time: 11/18/22  8:51 AM   Result Value Ref Range    Crossmatch Expiration 11/21/2022,2359     ABO/Rh(D) AB POSITIVE     Antibody screen NEG     Unit number [id number]     Blood component type RC LR     Unit division 00     Status of unit ISSUED     Crossmatch result Compatible        UA notable for nephrotic range proteinuria and microscopic hematuria  MANUEL direct positive  [place], RNP, dsDNA positive     Assessment:     Active Problems:    Bilateral pulmonary embolism ([address] (11/12/2022)    Nephrotic range proteinuria    Positive MANUEL, dsDNA, Sm, RNP    Patient has a Positive MANUEL, Sm/RNP ,dsDNA with active urine sediment that is highly suspicious for lupus nephritis. She also has b/l PEs and renal vein thrombosis. Her Cr is wnl and she is being tx for PNA at this time. Agree with Renal biopsy to find the etiology of proteinuria. It is very possible her fevers, airspace disease and pleural effusions are 2/2 an autoimmune process as well. Plan:     Since her Cr is wnl and she is being tx for presumptive PNA,  I am going to hold off on starting high dose steroids right now, but have a very low threshold for starting. Would like to see her complete abx first and feel more confident she does not have active PNA. Once she is stable enough for a renal biopsy, recommend pursing to determine the etiology of the proteinuria  I am ordering complements,  MANUEL by IF and ANCAs to complete work up    --She will need follow up chest imaging to look for resolution of the pleural effusion and  airspace disease.       Signed By: Kb Reddy MD     November 18, 2022

## 2022-11-18 NOTE — PROGRESS NOTES
Problem: Falls - Risk of  Goal: *Absence of Falls  Description: Document Norma Avila Fall Risk and appropriate interventions in the flowsheet.   Outcome: Progressing Towards Goal  Note: Fall Risk Interventions:            Medication Interventions: Teach patient to arise slowly         History of Falls Interventions: Bed/chair exit alarm         Problem: Pulmonary Embolism Care Plan (Adult)  Goal: *Improvement of existing pulmonary embolism  Outcome: Progressing Towards Goal  Goal: *Absence of bleeding  Outcome: Progressing Towards Goal  Goal: *Labs within defined limits  Outcome: Progressing Towards Goal

## 2022-11-18 NOTE — PROGRESS NOTES
Transition of Care Plan:     RUR: 5%- Low Risk  HARVINDER: 11/21? Disposition:Home   Follow up appointments: PCP, [address]  DME needed: none  Transportation at Discharge: Boyfriend to provide transportation  Alyson Vásquez or means to access home:    Pt has access  IM Medicare Letter: n/a- BS of [employer]  Caregiver Contact: Thao Medellin - [phone]  Discharge Caregiver contacted prior to discharge? If pt desires  Care Conference needed?:        n/a    CM will continue to monitor discharge plan.      Jacy Bird[address]  Ext [phone]

## 2022-11-18 NOTE — PROGRESS NOTES
0700: adEnd of Shift Note    Bedside shift change report given to Zoë Jaimes RN (oncoming nurse) by Peter Shahid (offgoing nurse). Report included the following information SBAR, Kardex, Intake/Output, MAR, Recent Results, and Cardiac Rhythm ST    Shift worked:  [id number]     Shift summary and any significant changes:     Unable to get occult stool sample; no events; low grade fevers but not high enough for Tylenol     Concerns for physician to address:  Low fevers- rheum consulted     Zone phone for oncoming shift:          Activity:  Activity Level: Up ad leticia  Number times ambulated in hallways past shift: 0  Number of times OOB to chair past shift: 0    Cardiac:   Cardiac Monitoring: Yes      Cardiac Rhythm: Sinus Tachy    Access:  Current line(s): PIV     Genitourinary:   Urinary status: voiding    Respiratory:   O2 Device: None (Room air)  Chronic home O2 use?: NO  Incentive spirometer at bedside: NO       GI:  Last Bowel Movement Date: 11/17/22  Current diet:  ADULT DIET Regular  Passing flatus: YES  Tolerating current diet: YES       Pain Management:   Patient states pain is manageable on current regimen: YES    Skin:  Gaurav Score: 22  Interventions: increase time out of bed    Patient Safety:  Fall Score:  Total Score: 1  Interventions: gripper socks       Length of Stay:  Expected LOS: 2d 14h  Actual LOS: [address]

## 2022-11-18 NOTE — PROGRESS NOTES
Hematology Oncology Progress Note       Follow up for: bilateral PE and renal vein thrombosis. Chart notes reviewed since last visit. Case discussed with following: patient, RN    Patient complains of the following:  she is tired of being here and feels better today, so wants to go home. Additional concerns noted by the staff: temp curve improving    Patient Vitals for the past 24 hrs:   BP Temp Pulse Resp SpO2 Weight   11/18/22 0858 -- -- -- -- 94 % --   11/18/22 0751 (!) 143/81 99.5 °F (37.5 °C) (!) 106 18 94 % --   11/18/22 0622 -- -- -- -- -- 116 kg (255 lb 11.7 oz)   11/18/22 0325 125/70 100.2 °F (37.9 °C) (!) 108 18 93 % --   11/17/22 2316 -- 100.3 °F (37.9 °C) -- -- -- --   11/17/22 2306 (!) 147/78 (!) 100.5 °F (38.1 °C) (!) 113 18 97 % --   11/17/22 1939 118/77 99.2 °F (37.3 °C) (!) 115 17 94 % --   11/17/22 1655 129/82 100 °F (37.8 °C) (!) 118 16 96 % --   11/17/22 1434 131/78 99.6 °F (37.6 °C) (!) 112 19 97 % --   11/17/22 1158 119/61 99.4 °F (37.4 °C) (!) 110 17 96 % --         Review of Systems: negative for 11 organ systems except as noted. Physical Examination:  Constitutional Alert, cooperative, oriented. Mood and affect appropriate. Appears close to chronological age. Well nourished. Well developed. Seems    Head Normocephalic; no scars   Eyes Conjunctivae and sclerae are clear and without icterus. Pupils are round   ENMT Sinuses are nontender. No oral exudates, ulcers, masses, thrush or mucositis. Oropharynx clear. Tongue normal.   Neck Supple without masses or thyromegaly. No jugular venous distension. Hematologic/Lymphatic No petechiae or purpura. No tender or palpable lymph nodes appreciated   Respiratory Lungs are clear to auscultation without rhonchi or wheezing. Cardiovascular Regular rate and rhythm of heart without murmurs, gallops or rubs. Chest / Line Site Chest is symmetric with no chest wall deformities.    Abdomen Non-tender, non-distended, no masses, ascites or hepatosplenomegaly. Good bowel sounds. Musculoskeletal No tenderness or swelling, normal range of motion without obvious weakness. Extremities No visible deformities, no cyanosis, clubbing or edema. Skin No rashes, scars, or lesions suggestive of malignancy. No petechiae, purpura, or ecchymoses. No excoriations. Neurologic No sensory or motor deficits noted but not specifically tested   Psychiatric Alert and oriented. Coherent speech. Verbalizes understanding of our discussions today. Labs:  Recent Results (from the past 24 hour(s))   METABOLIC PANEL, COMPREHENSIVE    Collection Time: 11/18/22  3:19 AM   Result Value Ref Range    Sodium 135 (L) 136 - 145 mmol/L    Potassium 3.4 (L) 3.5 - 5.1 mmol/L    Chloride 104 97 - 108 mmol/L    CO2 22 21 - 32 mmol/L    Anion gap 9 5 - 15 mmol/L    Glucose 87 65 - 100 mg/dL    BUN 8 6 - 20 MG/DL    Creatinine 0.67 0.55 - 1.02 MG/DL    BUN/Creatinine ratio 12 12 - 20      eGFR >60 >60 ml/min/1.73m2    Calcium 7.6 (L) 8.5 - 10.1 MG/DL    Bilirubin, total 0.4 0.2 - 1.0 MG/DL    ALT (SGPT) 10 (L) 12 - 78 U/L    AST (SGOT) 38 (H) 15 - 37 U/L    Alk.  phosphatase 52 45 - 117 U/L    Protein, total 5.9 (L) 6.4 - 8.2 g/dL    Albumin 1.0 (L) 3.5 - 5.0 g/dL    Globulin 4.9 (H) 2.0 - 4.0 g/dL    A-G Ratio 0.2 (L) 1.1 - 2.2     PHOSPHORUS    Collection Time: 11/18/22  3:19 AM   Result Value Ref Range    Phosphorus 2.5 (L) 2.6 - 4.7 MG/DL   FERRITIN    Collection Time: 11/18/22  3:19 AM   Result Value Ref Range    Ferritin 717 (H) 26 - 388 NG/ML   CBC WITH AUTOMATED DIFF    Collection Time: 11/18/22  3:19 AM   Result Value Ref Range    WBC 5.0 3.6 - 11.0 K/uL    RBC 2.45 (L) 3.80 - 5.20 M/uL    HGB 6.8 (L) 11.5 - 16.0 g/dL    HCT 20.8 (L) 35.0 - 47.0 %    MCV 84.9 80.0 - 99.0 FL    MCH 27.8 26.0 - 34.0 PG    MCHC 32.7 30.0 - 36.5 g/dL    RDW 13.0 11.5 - 14.5 %    PLATELET 979 073 - 831 K/uL    MPV 11.8 8.9 - 12.9 FL    NRBC 0.0 0  WBC    ABSOLUTE NRBC 0.00 0.00 - 0.01 K/uL    NEUTROPHILS 66 32 - 75 %    LYMPHOCYTES 26 12 - 49 %    MONOCYTES 7 5 - 13 %    EOSINOPHILS 0 0 - 7 %    BASOPHILS 0 0 - 1 %    IMMATURE GRANULOCYTES 1 (H) 0.0 - 0.5 %    ABS. NEUTROPHILS 3.2 1.8 - 8.0 K/UL    ABS. LYMPHOCYTES 1.3 0.8 - 3.5 K/UL    ABS. MONOCYTES 0.4 0.0 - 1.0 K/UL    ABS. EOSINOPHILS 0.0 0.0 - 0.4 K/UL    ABS. BASOPHILS 0.0 0.0 - 0.1 K/UL    ABS. IMM. GRANS. 0.1 (H) 0.00 - 0.04 K/UL    DF SMEAR SCANNED      RBC COMMENTS NORMOCYTIC, NORMOCHROMIC     IRON PROFILE    Collection Time: 11/18/22  3:19 AM   Result Value Ref Range    Iron 17 (L) 35 - 150 ug/dL    TIBC 122 (L) 250 - 450 ug/dL    Iron % saturation 14 (L) 20 - 50 %   OCCULT BLOOD, STOOL    Collection Time: 11/18/22  7:46 AM   Result Value Ref Range    Occult blood, stool Negative NEG     RBC, ALLOCATE    Collection Time: 11/18/22  8:15 AM   Result Value Ref Range    HISTORY CHECKED? Historical check performed    TYPE & SCREEN    Collection Time: 11/18/22  8:51 AM   Result Value Ref Range    Crossmatch Expiration 11/21/2022,2359     ABO/Rh(D) PENDING     Antibody screen PENDING        Imaging:  CTA chest, abd and pelvis 11/12/22  THYROID: 2.1 cm right thyroid nodule. Small left thyroid nodules. MEDIASTINUM/SOLANGE: Mildly enlarged bilateral axillary lymph nodes, 1.2-1.3 cm. Right paratracheal lymph nodes at the 1.1 cm. HEART/PERICARDIUM: Unremarkable. AORTA: No aneurysm. PULMONARY ARTERIES: Suboptimal contrast opacification of the pulmonary arterial  circuit, though there are bilateral lower lobe pulmonary emboli. LUNGS/PLEURA: Patchy airspace disease in the bilateral lower lobes and lingula. Small to moderate left pleural effusion. No pneumothorax. BONES: No destructive bone lesion. ADDITIONAL COMMENTS: N/A     LIVER: No mass or biliary dilatation. GALLBLADDER: Pericholecystic fluid. Possible stones/sludge within the  gallbladder. SPLEEN: Enlarged, 16.5 cm in craniocaudal dimension.  No definite splenic vein  thrombus. PANCREAS: No mass or ductal dilatation. ADRENALS: No mass. KIDNEYS: Mild asymmetric enlargement of the left kidney relative to the right,  with mild left perinephric stranding. No hydronephrosis or nephrolithiasis. Subtle filling defects in the left renal vein  GI TRACT: No bowel obstruction or wall thickening  PERITONEUM: No free air or free fluid. APPENDIX: Unremarkable. Poonam Fanning RETROPERITONEUM: No lymphadenopathy or aortic aneurysm. URINARY BLADDER: No mass or calculus. LYMPH NODES: Prominent bilateral external iliac chain and inguinal lymph nodes. REPRODUCTIVE ORGANS: Unremarkable. FREE FLUID: None. BONES: No destructive bone lesion. IMPRESSION     1. Acute bilateral lower lobe pulmonary emboli. 2. Patchy bilateral airspace disease and small to moderate left pleural  effusion. Mildly enlarged thoracic lymph nodes. 3. Left renal vein thrombus, with slight renal enlargement and perinephric  stranding as above. 4. Splenomegaly. No definite splenic vein thrombosis. 5. Pericholecystic fluid. Possible cholelithiasis/sludge. 6. 2.1 cm right thyroid nodule. Recommend outpatient ultrasound, possibly  warranting fine-needle aspiration. Dopplers lower extremities 11/12/22     No evidence of acute deep vein thrombosis in the right common femoral, femoral, popliteal, posterior tibial, and peroneal veins. The veins were imaged in the transverse and longitudinal planes. The vessels showed normal color filling and compressibility. Doppler interrogation showed phasic and spontaneous flow. No evidence of deep vein thrombosis in the right lower extremity. No evidence of acute deep vein thrombosis in the left common femoral, femoral, popliteal, posterior tibial, and peroneal veins. The veins were imaged in the transverse and longitudinal planes. The vessels showed normal color filling and compressibility. Doppler interrogation showed phasic and spontaneous flow.   No evidence of deep vein thrombosis in the left lower extremity. Assessment and Plan:   hypercoagulable state manifested by renal vein thrombosis and bilateral pulmonary emboli - Renal vein thrombosis is commonly associated with nephrotic syndrome. Some have hypothesized that the urinary losses of antithrombin III or that a decrease in protein S due to urinary loss or excess binding to increased levels of [id number]-[id number] protein may be contributing factors. Antiphospholipid antibody syndrome has been documented in about 20% of recurrent renal vein thrombosis in young to middle aged adults. The classic triad of acute flank pain, hematuria,, and sudden deterioration in renal function is seen in 10-20% of cases. More commonly, one sees a more chronic forms usually manifested by slowly worsening renal function, progressive proteinuria, and edema. Given her renal vein thrombosis and bilateral PE,  she will need to be on anticoagulation for an extended period. Fortunately her weight and creatinine are within acceptable range for a DOAC. I ordered the more common screening studies for VTE that have a chance of being accurate acute with an event. Acutely protein C, S, ATIII will be impacted by consumption during clot, while paradoxically sometimes going up as an acute phase reactant. Given her young age, these ideally would be checked but as noted earlier, you can lose these as part of the nephrotic syndrome. Her anticardiolipin antibody panel is negative, beta 2 glycoprotein antibodies are negative and lupus anticoagulant testing is negative, ruling out antiphospholipid antibody syndrome. The factor V Leiden and prothrombin mutation testing are pending. Her gammopathy panel reveals an M spike of 0.2 grams/dL (do not see that ESPERANZA has been ordered on serum, so will order). Her FLC ratio is normal so not a monoclonal process, making myeloma ulikley and favoring that the M protein is an MGUS.     + dxDNA - raises question of SLE. Rheumatology being consulted. Dr. Tamera Robles apparently saw her earlier today - note not yet on chart. She has an implanted etonogestrel, which is a progestogen, and this may have increased her risk of VTE a bit. [facility] [address] and talked to one of their MDs about reviewing her method of birth control. Patient is comfortable with using an alternative method of birth control. That MD suggested that pt followup with Dr. Aliya Langford as outpatient to discuss potentially removing it. Fevers - urine and blood cultures pending from 11/15. Urine culture from 11/12 negative. Blood culture 11/12 no growth at 4 days. RSV, influenza A and B negative on 11/12 and 1115. Hepatitis testing negative. Chest Xray raised concern of bibasilar atelectasis vs PNA vs pulmonary infarcts. Left pleural effusion with followup Xray recommended in 8-10 weeks to ensure resolution. Her CT scan raised the question of pericholecystic fluid, possible cholelithiasis and sludge which might prove to another possible source of her fever. With lack of pain and GI symptoms,  however, this seems less likely. If nothing pans out, with productive cough and CXray findings, pneumonia +/- pulmonary infarction are the leading contenders for cause of fever. Now that there is a supsicion of lupus, could vasculitis be causing her fever? Currently on rocephin and azithromycin. Ideally she would stay in house and undergo her kidney biopsy on Monday (will need lovenox held prior to procedure).  Once bx done, can switch to DOAC (given that this has to be held 48 hrs to procedure, would not start now as it would need to be held immediately and I have tried to explain this to her)       Taras Favre MD [facility] office  [address]  [place], [facility] [address]  Phone [phone]  Fax [phone]

## 2022-11-18 NOTE — PROGRESS NOTES
Hospitalist Progress Note    NAME: Sherron Phoenix   :  2000   MRN:  [id number]            Subjective:     Chief Complaint / Reason for Physician Visit  FU PE   AFebrile this morning, hemoglobin dropped down to 6.8  Patient reported that she she has currently her menses  No reported blood in her stool  Discussed with RN events overnight. Review of Systems:  Symptom Y/N Comments  Symptom Y/N Comments   Fever/Chills n   Chest Pain n    Poor Appetite n   Edema n    Cough n   Abdominal Pain n    Sputum n   Joint Pain n    SOB/LUNA n   Pruritis/Rash n    Nausea/vomit n   Tolerating PT/OT     Diarrhea n   Tolerating Diet     Constipation n   Other       Could NOT obtain due to:      Objective:     VITALS:   Last 24hrs VS reviewed since prior progress note.  Most recent are:  Patient Vitals for the past 24 hrs:   Temp Pulse Resp BP SpO2   22 1527 99 °F (37.2 °C) (!) 107 18 127/80 98 %   22 1407 99 °F (37.2 °C) (!) 105 18 131/73 98 %   22 1352 -- (!) 105 -- 130/80 --   22 1337 -- (!) 102 -- (!) 144/71 --   22 1322 -- (!) 115 -- 136/86 --   22 1307 99 °F (37.2 °C) (!) 105 18 138/67 99 %   22 1252 -- (!) 102 -- 132/71 --   22 1237 99.1 °F (37.3 °C) (!) 110 -- 126/78 --   22 1222 -- (!) 104 18 122/82 98 %   22 1217 98.9 °F (37.2 °C) (!) 106 18 132/74 98 %   22 1202 99.5 °F (37.5 °C) (!) 110 18 132/72 98 %   22 1111 99.2 °F (37.3 °C) (!) 114 18 (!) 148/85 95 %   22 0858 -- -- -- -- 94 %   22 0751 99.5 °F (37.5 °C) (!) 106 18 (!) 143/81 94 %   22 0325 100.2 °F (37.9 °C) (!) 108 18 125/70 93 %   22 2316 100.3 °F (37.9 °C) -- -- -- --   22 2306 (!) 100.5 °F (38.1 °C) (!) 113 18 (!) 147/78 97 %   22 1939 99.2 °F (37.3 °C) (!) 115 17 118/77 94 %         Intake/Output Summary (Last 24 hours) at 2022 1655  Last data filed at 2022 1441  Gross per 24 hour   Intake 323.33 ml   Output --   Net 323.33 ml PHYSICAL EXAM:  General: WD, WN. Alert, cooperative, no acute distress    EENT:  EOMI. Anicteric sclerae. MMM  Resp:  CTA bilaterally, no wheezing or rales. No accessory muscle use  CV:  Regular  rhythm,  No edema  GI:  Soft, Non distended, Non tender. +Bowel sounds  Neurologic:  Alert and oriented X 3, normal speech,   Psych:   Good insight. Not anxious nor agitated  Skin:  No rashes. No jaundice    Procedures: see electronic medical records for all procedures/Xrays and details which were not copied into this note but were reviewed prior to creation of Plan. LABS:  I reviewed today's most current labs and imaging studies.   Pertinent labs include:  Recent Labs     11/18/22 0319 11/17/22 0540 11/16/22  0239   WBC 5.0 4.4 4.9   HGB 6.8* 8.3* 10.1*   HCT 20.8* 25.4* 30.1*    216 149*       Recent Labs     11/18/22 0319 11/17/22 0540 11/16/22  0339   * 135* 134*   K 3.4* 3.5 3.6    104 103   CO2 22 22 20*   GLU 87 88 90   BUN 8 9 12   CREA 0.67 0.60 0.71   CA 7.6* 7.4* 7.3*   MG  --   --  1.8   PHOS 2.5*  --   --    ALB 1.0*  --   --    TBILI 0.4  --   --    ALT 10*  --   --    INR  --   --  1.1         Signed: Carmel Varma MD        Reviewed most current lab test results and cultures  YES  Reviewed most current radiology test results   YES  Review and summation of old records today    NO  Reviewed patient's current orders and MAR    YES  PMH/SH reviewed - no change compared to H&P      Assessment / Plan:  Acute bilateral pulmonary emboli POA  Left renal vein thrombosis POA  Persistent fevers   Concern for Lupus   Acute on chronic blood loss anemia  Presents with pleuritic flank pain  LE dopplers-negative  HS troponin and pBNP-negative  COVID negative  Telemetry  Presence of left renal vein thrombosis argues for possible hypercoaguable state              Has hormonal implant for pregnancy prevention              > 300 mg protein in urine and serum albumin 1.3 raises issue of nephrotic syndrome                          Check spot urine protein/creat ratio  Consulted hematology and nephrology, expertise appreciated  Lovenox 1 mg/kg, hold for renal biopsy then transition to NOAC after renal bx. Follow up outpatient for discussion of birth control options. Acute on chronic anemia   Iron deficiency anemia  Hb dropped down to 8 from 10g on 11/17  Iron profile showed low serum iron, low TIBC and low iron saturation but elevated ferritin. Will defer to heme-onc  Stool  occult blood negative  Hemoglobin dropped down to 6.8, a unit of PRBC ordered  Might benefit from IV iron    SIRS POA , respiratory rate 38, temp 100.8  Acute bilateral patchy airspace disease POA questionable infection versus pulmonary infarction  Upper respiratory infection POA  Ruled out COVID-19 POA with fever, upper respiratory symptoms, new PEs, diarrhea  CT chest with bilateral PE, patchy bilateral airspace disease  Concern for underlying infection in addition to the pulmonary emboli              Fever could also be due to the blood clots  Rapid COVID-negative  Influenza A/B antigen testing negative  Respiratory PCR-negative  Procalcitonin 0.06 Argues against bacterial pneumonia  Ucx negative              Hold antibiotics  11/15-persistent fever. Some tachycardia. Repeated CXR-atelectasis vs pna vs pulmonary infarcts, repeated cultures, and viral panels. No new complaints of cough, etc. Started ceftriaxone and azithromycin. Continue to follow. 11/16: continue spiking fevers , ID consulted , bcx NTD   11/17: AFebrile this morning, reviewed lab work, MANUEL/anti DNA + , concern for lupus   Rheum consulted .  Renal bx still on hold due to persistent fevers      2.1 cm right thyroid nodule  Followed prior to admit, reports she had a biopsy that was benign several months ago  TSH 6.80, FT3 1.3, FT4 1.1  PCP follow up after acute illness     Cholelithiasis POA  Intermittent RUQ pain, currently non tender    40 or above Morbid obesity / Body mass index is 43.9 kg/m². Code status: Full  Prophylaxis: Lovenox  Recommended Disposition: Home w/Family     ________________________________________________________________________  Care Plan discussed with:    Comments   Patient x    Family      RN     Care Manager     Consultant  x [facility] onc                      Multidiciplinary team rounds were held today with , nursing, pharmacist and clinical coordinator. Patient's plan of care was discussed; medications were reviewed and discharge planning was addressed.      ________________________________________________________________________  Total NON critical care TIME:  35   Minutes    Total CRITICAL CARE TIME Spent:   Minutes non procedure based      Comments   >50% of visit spent in counseling and coordination of care     ________________________________________________________________________  Henri Barnett MD

## 2022-11-18 NOTE — PROGRESS NOTES
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  can't recommend renal biopsy under these condidtions. D/W heme/pt--on broad spectrum antibiotics now  Can always do renal biopsy as an outpt. Recommend rheum consult. Will set up Renal biopsy for Monday if patient is still here (Lovenox will need to be held after tonights dose). Otherwise will need to coordinate as outpatient    PRBC today    Oral KCl 40meq x1 dose    Recommend ECHO-    CTA/CT:  1. Acute bilateral lower lobe pulmonary emboli. 2. Patchy bilateral airspace disease and small to moderate left pleural  effusion. Mildly enlarged thoracic lymph nodes. 3. Left renal vein thrombus, with slight renal enlargement and perinephric  stranding as above. 4. Splenomegaly. No definite splenic vein thrombosis. 5. Pericholecystic fluid. Possible cholelithiasis/sludge. 6. 2.1 cm right thyroid nodule. Recommend outpatient ultrasound, possibly  warranting fine-needle aspiration. Pt has seen  for her MNG           Subjective:   CHIEF COMPLAINT:  \"No offense but I would really like to go home\" Low grade fever last night. No complaints.       Past Medical History:   Diagnosis Date    Anxiety and depression       Past Surgical History:   Procedure Laterality Date    HX WISDOM TEETH EXTRACTION       Social History     Tobacco Use    Smoking status: Never    Smokeless tobacco: Never   Substance Use Topics    Alcohol use: Yes     Comment: rarely      Family History   Problem Relation Age of Onset    No Known Problems Mother     No Known Problems Father     Cancer Sister         hodgkins lymphoma    No Known Problems Brother     Lung Cancer Maternal Grandmother     Diabetes Maternal Grandmother     Hypertension Maternal Grandmother     No Known Problems Maternal Grandfather     Cancer Paternal Grandmother     No Known Problems Paternal Grandfather       No Known Allergies   Prior to Admission medications    Not on File     REVIEW OF SYSTEMS:    Sister with NHL  No n/v/cp/sob    Objective:   VITALS:    Visit Vitals  BP (!) 143/81 (BP 1 Location: Left upper arm)   Pulse (!) 106   Temp 99.5 °F (37.5 °C)   Resp 18   Ht 5' 4\" (1.626 m)   Wt 116 kg (255 lb 11.7 oz)   SpO2 94%   BMI 43.90 kg/m²     PHYSICAL EXAM:  Gen:  []  WD []  WN  [] cachectic []  thin [x]  obese []  disheveled             []  ill apearing  []   Critical  []   Chronic    [x]  No acute distress    HEENT:   [x] NC/AT/PERRL    [] pink conjunctivae      [] pale conjunctivae                  PERRL  [] yes  [] no      [] moist mucosa    [] dry mucosa    hearing intact to voice [x] yes  [] No                 NECK:   supple [] yes  [] no        masses [] yes  [] No               thyroid  []  non tender  []  tender    RESP:   [x] CTA bilaterally/no wheezing/rhonchi/rales/crackles    [] rhonchi bilaterally - no dullness  [] wheezing   [] rhonchi   [] crackles     use of accessory muscles [] yes [x] no    CARD:   [x]  regular rate and rhythm/No murmurs/rubs/gallops    murmur  [] yes ()  [] no      Rubs  [] yes  [] no       Gallops [] yes  [] no    Rate []  regular  []  irregular        carotid bruits  [] Right  []  Left                 LE edema [] yes  [x] no           JVP  [] yes   []  no    ABD:    [x] soft/non distended/non tender/+bowel sounds/no HSM    []  Rigid    tenderness [] yes [] no   Liver enlargement  []  yes []  no                Spleen enlargement  []  yes []  no     distended []  yes [] no     bowel sound  [] hypoactive   [] hyperactive    LYMPH:    Neck []  yes []  no       Axillae []  yes []  no    SKIN:   Rashes []  yes   [x]  no    Ulcers []  yes   []  no               [] tight to palpitation    skin turgor [x]  good  [] poor  [] decreased               Cyanosis/clubbing []  yes []  no    NEUR:   [x] cranial nerves II-XII grossly intact       [] Cranial nerves deficit                 []  facial droop    []  slurred speech   [] aphasic     [] Strength normal     []  weakness  []  LUE  []   RUE/ []  LLE  []   RLE    follows commands  [x]  yes []  no           PSYCH:   insight [] poor [x] good   Alert and Oriented to  [x] person  [x] place  [x]  time                    [] depressed [] anxious [] agitated  [] lethargic [] stuporous  [] sedated     LAB DATA REVIEWED:    Recent Labs     11/18/22 0319 11/17/22 0540   WBC 5.0 4.4   HGB 6.8* 8.3*   HCT 20.8* 25.4*    216       Recent Labs     11/18/22 0319 11/17/22 0540 11/16/22 0339   * 135* 134*   K 3.4* 3.5 3.6    104 103   CO2 22 22 20*   BUN 8 9 12   CREA 0.67 0.60 0.71   GLU 87 88 90   CA 7.6* 7.4* 7.3*   MG  --   --  1.8   PHOS 2.5*  --   --        Recent Labs     11/18/22 0319   ALT 10*   AP 52   TBILI 0.4   ALB 1.0*   GLOB 4.9*       Recent Labs     11/16/22 0339   INR 1.1   PTP 11.2*        Recent Labs     11/18/22 0319   TIBC 122*   PSAT 14*   FERR 717*        No results for input(s): PH, PCO2, PO2 in the last 72 hours. No results for input(s): CPK, CKMB in the last 72 hours.     No lab exists for component: TROPONINI  No results found for: GLUCPOC    Procedures: see electronic medical records for all procedures/Xrays and details which were not copied into this note but were reviewed prior to creation of Plan.    ________________________________________________________________________       ___________________________________________________  Consulting Physician: Paula Son, MD

## 2022-11-19 LAB
ABO + RH BLD: NORMAL
ALBUMIN SERPL-MCNC: 0.9 G/DL (ref 3.5–5)
ALBUMIN/GLOB SERPL: 0.2 {RATIO} (ref 1.1–2.2)
ALP SERPL-CCNC: 51 U/L (ref 45–117)
ALT SERPL-CCNC: 13 U/L (ref 12–78)
ANION GAP SERPL CALC-SCNC: 8 MMOL/L (ref 5–15)
AST SERPL-CCNC: 39 U/L (ref 15–37)
BASOPHILS # BLD: 0 K/UL (ref 0–0.1)
BASOPHILS NFR BLD: 0 % (ref 0–1)
BILIRUB SERPL-MCNC: 0.2 MG/DL (ref 0.2–1)
BLD PROD TYP BPU: NORMAL
BLOOD GROUP ANTIBODIES SERPL: NORMAL
BPU ID: NORMAL
BUN SERPL-MCNC: 7 MG/DL (ref 6–20)
BUN/CREAT SERPL: 13 (ref 12–20)
C3 SERPL-MCNC: 61 MG/DL (ref 82–167)
C4 SERPL-MCNC: 7 MG/DL (ref 12–38)
CALCIUM SERPL-MCNC: 7.4 MG/DL (ref 8.5–10.1)
CHLORIDE SERPL-SCNC: 107 MMOL/L (ref 97–108)
CO2 SERPL-SCNC: 22 MMOL/L (ref 21–32)
CREAT SERPL-MCNC: 0.54 MG/DL (ref 0.55–1.02)
CROSSMATCH RESULT,%XM: NORMAL
DIFFERENTIAL METHOD BLD: ABNORMAL
EOSINOPHIL # BLD: 0 K/UL (ref 0–0.4)
EOSINOPHIL NFR BLD: 1 % (ref 0–7)
ERYTHROCYTE [DISTWIDTH] IN BLOOD BY AUTOMATED COUNT: 13 % (ref 11.5–14.5)
GLOBULIN SER CALC-MCNC: 4.6 G/DL (ref 2–4)
GLUCOSE SERPL-MCNC: 92 MG/DL (ref 65–100)
HCT VFR BLD AUTO: 29.2 % (ref 35–47)
HGB BLD-MCNC: 10 G/DL (ref 11.5–16)
IMM GRANULOCYTES # BLD AUTO: 0 K/UL (ref 0–0.04)
IMM GRANULOCYTES NFR BLD AUTO: 1 % (ref 0–0.5)
LYMPHOCYTES # BLD: 0.8 K/UL (ref 0.8–3.5)
LYMPHOCYTES NFR BLD: 25 % (ref 12–49)
MAGNESIUM SERPL-MCNC: 1.8 MG/DL (ref 1.6–2.4)
MCH RBC QN AUTO: 28.6 PG (ref 26–34)
MCHC RBC AUTO-ENTMCNC: 34.2 G/DL (ref 30–36.5)
MCV RBC AUTO: 83.4 FL (ref 80–99)
MONOCYTES # BLD: 0.2 K/UL (ref 0–1)
MONOCYTES NFR BLD: 7 % (ref 5–13)
NEUTS SEG # BLD: 2 K/UL (ref 1.8–8)
NEUTS SEG NFR BLD: 66 % (ref 32–75)
NRBC # BLD: 0 K/UL (ref 0–0.01)
NRBC BLD-RTO: 0 PER 100 WBC
PHOSPHATE SERPL-MCNC: 2.6 MG/DL (ref 2.6–4.7)
PLATELET # BLD AUTO: 262 K/UL (ref 150–400)
PMV BLD AUTO: 11.9 FL (ref 8.9–12.9)
POTASSIUM SERPL-SCNC: 4 MMOL/L (ref 3.5–5.1)
PROT SERPL-MCNC: 5.5 G/DL (ref 6.4–8.2)
RBC # BLD AUTO: 3.5 M/UL (ref 3.8–5.2)
SODIUM SERPL-SCNC: 137 MMOL/L (ref 136–145)
SPECIMEN EXP DATE BLD: NORMAL
STATUS OF UNIT,%ST: NORMAL
UNIT DIVISION, %UDIV: 0
WBC # BLD AUTO: 3.1 K/UL (ref 3.6–11)

## 2022-11-19 PROCEDURE — 65660000001 HC RM ICU INTERMED STEPDOWN

## 2022-11-19 PROCEDURE — 74011000250 HC RX REV CODE- 250: Performed by: INTERNAL MEDICINE

## 2022-11-19 PROCEDURE — 74011000258 HC RX REV CODE- 258: Performed by: INTERNAL MEDICINE

## 2022-11-19 PROCEDURE — 83735 ASSAY OF MAGNESIUM: CPT

## 2022-11-19 PROCEDURE — 80053 COMPREHEN METABOLIC PANEL: CPT

## 2022-11-19 PROCEDURE — 85025 COMPLETE CBC W/AUTO DIFF WBC: CPT

## 2022-11-19 PROCEDURE — 74011250636 HC RX REV CODE- 250/636: Performed by: INTERNAL MEDICINE

## 2022-11-19 PROCEDURE — 84100 ASSAY OF PHOSPHORUS: CPT

## 2022-11-19 PROCEDURE — 36415 COLL VENOUS BLD VENIPUNCTURE: CPT

## 2022-11-19 RX ADMIN — SODIUM CHLORIDE, PRESERVATIVE FREE 10 ML: 5 INJECTION INTRAVENOUS at 18:25

## 2022-11-19 RX ADMIN — ENOXAPARIN SODIUM 120 MG: 150 INJECTION SUBCUTANEOUS at 09:15

## 2022-11-19 RX ADMIN — SODIUM CHLORIDE, PRESERVATIVE FREE 10 ML: 5 INJECTION INTRAVENOUS at 21:30

## 2022-11-19 RX ADMIN — CEFEPIME 2 G: 2 INJECTION, POWDER, FOR SOLUTION INTRAVENOUS at 01:50

## 2022-11-19 RX ADMIN — SODIUM CHLORIDE, PRESERVATIVE FREE 10 ML: 5 INJECTION INTRAVENOUS at 05:20

## 2022-11-19 RX ADMIN — CEFEPIME 2 G: 2 INJECTION, POWDER, FOR SOLUTION INTRAVENOUS at 18:26

## 2022-11-19 RX ADMIN — CEFEPIME 2 G: 2 INJECTION, POWDER, FOR SOLUTION INTRAVENOUS at 09:15

## 2022-11-19 RX ADMIN — ENOXAPARIN SODIUM 120 MG: 150 INJECTION SUBCUTANEOUS at 20:16

## 2022-11-19 RX ADMIN — AZITHROMYCIN DIHYDRATE 500 MG: 500 INJECTION, POWDER, LYOPHILIZED, FOR SOLUTION INTRAVENOUS at 21:29

## 2022-11-19 NOTE — PROGRESS NOTES
Hospitalist Progress Note    NAME: Lopez Valadez   :  2000   MRN:  [id number]            Subjective:     Chief Complaint / Reason for Physician Visit  FU PE   No acute issues  Discussed with RN events overnight. Review of Systems:  Symptom Y/N Comments  Symptom Y/N Comments   Fever/Chills n   Chest Pain n    Poor Appetite n   Edema n    Cough n   Abdominal Pain n    Sputum n   Joint Pain n    SOB/LUNA n   Pruritis/Rash n    Nausea/vomit n   Tolerating PT/OT     Diarrhea n   Tolerating Diet     Constipation n   Other       Could NOT obtain due to:      Objective:     VITALS:   Last 24hrs VS reviewed since prior progress note. Most recent are:  Patient Vitals for the past 24 hrs:   Temp Pulse Resp BP SpO2   22 0820 97.7 °F (36.5 °C) (!) 103 18 126/81 99 %   22 0200 99.8 °F (37.7 °C) 98 21 127/64 98 %   22 2308 99.4 °F (37.4 °C) 99 21 130/75 98 %   22 2021 99.7 °F (37.6 °C) 97 20 127/87 98 %   22 1600 -- (!) 103 -- -- --   22 1527 99 °F (37.2 °C) (!) 107 18 127/80 98 %   22 1407 99 °F (37.2 °C) (!) 105 18 131/73 98 %   22 1352 -- (!) 105 -- 130/80 --   22 1337 -- (!) 102 -- (!) 144/71 --   22 1322 -- (!) 115 -- 136/86 --   22 1307 99 °F (37.2 °C) (!) 105 18 138/67 99 %   22 1252 -- (!) 102 -- 132/71 --   22 1237 99.1 °F (37.3 °C) (!) 110 -- 126/78 --   22 1222 -- (!) 104 18 122/82 98 %   22 1217 98.9 °F (37.2 °C) (!) 106 18 132/74 98 %   22 1202 99.5 °F (37.5 °C) (!) 110 18 132/72 98 %   22 1111 99.2 °F (37.3 °C) (!) 114 18 (!) 148/85 95 %         Intake/Output Summary (Last 24 hours) at 2022 0950  Last data filed at 2022 0405  Gross per 24 hour   Intake 773.33 ml   Output --   Net 773.33 ml          PHYSICAL EXAM:  General: WD, WN. Alert, cooperative, no acute distress    EENT:  EOMI. Anicteric sclerae. MMM  Resp:  CTA bilaterally, no wheezing or rales.   No accessory muscle use  CV:  Regular rhythm,  No edema  GI:  Soft, Non distended, Non tender. +Bowel sounds  Neurologic:  Alert and oriented X 3, normal speech,   Psych:   Good insight. Not anxious nor agitated  Skin:  No rashes. No jaundice    Procedures: see electronic medical records for all procedures/Xrays and details which were not copied into this note but were reviewed prior to creation of Plan. LABS:  I reviewed today's most current labs and imaging studies.   Pertinent labs include:  Recent Labs     11/19/22 0145 11/18/22  1624 11/18/22 0319 11/17/22  0540   WBC 3.1*  --  5.0 4.4   HGB 10.0* 9.9* 6.8* 8.3*   HCT 29.2* 29.3* 20.8* 25.4*     --  287 216       Recent Labs     11/19/22 0145 11/18/22 0319 11/17/22  0540    135* 135*   K 4.0 3.4* 3.5    104 104   CO2 22 22 22   GLU 92 87 88   BUN 7 8 9   CREA 0.54* 0.67 0.60   CA 7.4* 7.6* 7.4*   MG 1.8  --   --    PHOS 2.6 2.5*  --    ALB 0.9* 1.0*  --    TBILI 0.2 0.4  --    ALT 13 10*  --          Signed: Hunter Ayers MD        Reviewed most current lab test results and cultures  YES  Reviewed most current radiology test results   YES  Review and summation of old records today    NO  Reviewed patient's current orders and MAR    YES  PMH/SH reviewed - no change compared to H&P      Assessment / Plan:  Acute bilateral pulmonary emboli POA  Left renal vein thrombosis POA  Persistent fevers   Concern for Lupus   Acute on chronic blood loss anemia  Presents with pleuritic flank pain  LE dopplers-negative  HS troponin and pBNP-negative  COVID negative  Telemetry  Presence of left renal vein thrombosis argues for possible hypercoaguable state              Has hormonal implant for pregnancy prevention              > 300 mg protein in urine and serum albumin 1.3 raises issue of nephrotic syndrome                          Check spot urine protein/creat ratio  Consulted hematology and nephrology, expertise appreciated  Lovenox 1 mg/kg, hold for renal biopsy then transition to NOAC after renal bx. Follow up outpatient for discussion of birth control options. 11/19: Patient seen by rheum yesterday, plan is to start prednisone once pneumonia has been adequately treated. Renal biopsy still pending     Acute on chronic anemia   Iron deficiency anemia  Hb dropped down to 8 from 10g on 11/17  Iron profile showed low serum iron, low TIBC and low iron saturation but elevated ferritin. Will defer to heme-onc  Stool  occult blood negative  Hemoglobin dropped down to 6.8, a unit of PRBC ordered  Might benefit from IV iron  11/19: s/p  a unit of PRBC, repeat hemoglobin is around 10    SIRS POA , respiratory rate 38, temp 100.8  Acute bilateral patchy airspace disease POA questionable infection versus pulmonary infarction  Upper respiratory infection POA  Ruled out COVID-19 POA with fever, upper respiratory symptoms, new PEs, diarrhea  CT chest with bilateral PE, patchy bilateral airspace disease  Concern for underlying infection in addition to the pulmonary emboli              Fever could also be due to the blood clots  Rapid COVID-negative  Influenza A/B antigen testing negative  Respiratory PCR-negative  Procalcitonin 0.06 Argues against bacterial pneumonia  Ucx negative              Hold antibiotics  11/15-persistent fever. Some tachycardia. Repeated CXR-atelectasis vs pna vs pulmonary infarcts, repeated cultures, and viral panels. No new complaints of cough, etc. Started ceftriaxone and azithromycin. Continue to follow. 11/16: continue spiking fevers , ID consulted , bcx NTD   11/17: AFebrile this morning, reviewed lab work, MANUEL/anti DNA + , concern for lupus   Rheum consulted .  Renal bx still on hold due to persistent fevers      2.1 cm right thyroid nodule  Followed prior to admit, reports she had a biopsy that was benign several months ago  TSH 6.80, FT3 1.3, FT4 1.1  PCP follow up after acute illness     Cholelithiasis POA  Intermittent RUQ pain, currently non tender    40 or above Morbid obesity / Body mass index is 43.9 kg/m². Code status: Full  Prophylaxis: Lovenox  Recommended Disposition: Home w/Family     ________________________________________________________________________  Care Plan discussed with:    Comments   Patient x    Family      RN     Care Manager     Consultant  x [facility] onc                      Multidiciplinary team rounds were held today with , nursing, pharmacist and clinical coordinator. Patient's plan of care was discussed; medications were reviewed and discharge planning was addressed.      ________________________________________________________________________  Total NON critical care TIME:  35   Minutes    Total CRITICAL CARE TIME Spent:   Minutes non procedure based      Comments   >50% of visit spent in counseling and coordination of care     ________________________________________________________________________  Chichi Browne MD

## 2022-11-19 NOTE — PROGRESS NOTES
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 Continues on IV antibiotics. I'm  not inclined to pursue renal biopsy under these condidtions. Can always do renal biopsy as an outpt. Recommend rheum consult. Potassium better. Recommend ECHO-      Pt has seen  for her MNG           Subjective:   CHIEF COMPLAINT:  \"when can I go home? \" Low grade fever last night. No complaints. No N/V. No dyspnea. No pain.       Past Medical History:   Diagnosis Date    Anxiety and depression       Past Surgical History:   Procedure Laterality Date    HX WISDOM TEETH EXTRACTION       Social History     Tobacco Use    Smoking status: Never    Smokeless tobacco: Never   Substance Use Topics    Alcohol use: Yes     Comment: rarely      Family History   Problem Relation Age of Onset    No Known Problems Mother     No Known Problems Father     Cancer Sister         hodgkins lymphoma    No Known Problems Brother     Lung Cancer Maternal Grandmother     Diabetes Maternal Grandmother     Hypertension Maternal Grandmother     No Known Problems Maternal Grandfather     Cancer Paternal Grandmother     No Known Problems Paternal Grandfather       No Known Allergies   Prior to Admission medications    Not on File     REVIEW OF SYSTEMS:    Sister with NHL  No n/v/cp/sob    Objective:   VITALS:    Visit Vitals  /64 (BP 1 Location: Left upper arm, BP Patient Position: At rest)   Pulse 98   Temp 99.8 °F (37.7 °C)   Resp 21   Ht 5' 4\" (1.626 m)   Wt 116 kg (255 lb 11.7 oz)   SpO2 98%   BMI 43.90 kg/m²     PHYSICAL EXAM:  Gen:  []  WD []  WN  [] cachectic []  thin [x]  obese []  disheveled             []  ill apearing  []   Critical  []   Chronic    [x]  No acute distress    HEENT:   [x] NC/AT/PERRL    [] pink conjunctivae      [] pale conjunctivae                  PERRL  [] yes  [] no      [] moist mucosa    [] dry mucosa    hearing intact to voice [x] yes  [] No                 NECK:   supple [] yes  [] no        masses [] yes  [] No               thyroid  []  non tender  []  tender    RESP:   [x] CTA bilaterally/no wheezing/rhonchi/rales/crackles    [] rhonchi bilaterally - no dullness  [] wheezing   [] rhonchi   [] crackles     use of accessory muscles [] yes [x] no    CARD:   [x]  regular rate and rhythm/No murmurs/rubs/gallops    murmur  [] yes ()  [] no      Rubs  [] yes  [] no       Gallops [] yes  [] no    Rate []  regular  []  irregular        carotid bruits  [] Right  []  Left                 LE edema [] yes  [x] no           JVP  []  yes   []  no    ABD:    [x] soft/non distended/non tender/+bowel sounds/no HSM    []  Rigid    tenderness [] yes [] no   Liver enlargement  []  yes []  no                Spleen enlargement  []  yes []  no     distended []  yes [] no     bowel sound  [] hypoactive   [] hyperactive    LYMPH:    Neck []  yes []  no       Axillae []  yes []  no    SKIN:   Rashes []  yes   [x]  no    Ulcers []  yes   []  no               [] tight to palpitation    skin turgor [x]  good  [] poor  [] decreased               Cyanosis/clubbing []  yes []  no    NEUR:   [x] cranial nerves II-XII grossly intact       [] Cranial nerves deficit                 []  facial droop    []  slurred speech   [] aphasic     [] Strength normal     []  weakness  []  LUE  []   RUE/ []  LLE  []   RLE    follows commands  [x]  yes []  no           PSYCH:   insight [] poor [x] good   Alert and Oriented to  [x] person  [x] place  [x]  time                    [] depressed [] anxious [] agitated  [] lethargic [] stuporous  [] sedated     LAB DATA REVIEWED:    Recent Labs     11/19/22 0145 11/18/22  1624 11/18/22 0319   WBC 3.1*  --  5.0   HGB 10.0* 9.9* 6.8*   HCT 29.2* 29.3* 20.8*     --  287       Recent Labs     11/19/22 0145 11/18/22 0319 11/17/22  0540    135* 135*   K 4.0 3.4* 3.5    104 104   CO2 22 22 22   BUN 7 8 9   CREA 0.54* 0.67 0.60   GLU 92 87 88   CA 7.4* 7.6* 7.4*   MG 1.8  --   --    PHOS 2.6 2.5*  --        Recent Labs     11/19/22 0145 11/18/22 0319   ALT 13 10*   AP 51 52   TBILI 0.2 0.4   ALB 0.9* 1.0*   GLOB 4.6* 4.9*       No results for input(s): INR, PTP, APTT, INREXT, INREXT in the last 72 hours. Recent Labs     11/18/22 0319   TIBC 122*   PSAT 14*   FERR 717*        No results for input(s): PH, PCO2, PO2 in the last 72 hours. No results for input(s): CPK, CKMB in the last 72 hours.     No lab exists for component: TROPONINI  No results found for: GLUCPOC    Procedures: see electronic medical records for all procedures/Xrays and details which were not copied into this note but were reviewed prior to creation of Plan.    ________________________________________________________________________       ___________________________________________________  Consulting Physician: Erin Vidales, MD

## 2022-11-19 NOTE — PROGRESS NOTES
0730 Bedside shift change report given to [address] (oncoming nurse) by Amanda Schneider (offgoing nurse). Report included the following information SBAR, Kardex, ED Summary, MAR, Recent Results, and Cardiac Rhythm NSR/Sinus Tach . End of Shift Note    Bedside shift change report given to Amanda Schneider (oncoming nurse) by Radha Still (offgoing nurse). Report included the following information SBAR and Kardex    Shift worked:  [id number]     Shift summary and any significant changes:     Patient has been afebrile today     Concerns for physician to address:  None      Zone phone for oncoming shift:   [phone]       Activity:  Activity Level: Up ad leticia  Number times ambulated in hallways past shift: 0  Number of times OOB to chair past shift: 3    Cardiac:   Cardiac Monitoring: Yes      Cardiac Rhythm: Sinus Rhythm    Access:  Current line(s): PIV / Endurance catheter    Genitourinary:   Urinary status: voiding    Respiratory:   O2 Device: None (Room air)  Chronic home O2 use?: NO  Incentive spirometer at bedside: NO       GI:  Last Bowel Movement Date: 11/18/22  Current diet:  ADULT DIET Regular  DIET NPO  Passing flatus: YES  Tolerating current diet: YES       Pain Management:   Patient states pain is manageable on current regimen: YES    Skin:  Gaurav Score: 22  Interventions: increase time out of bed    Patient Safety:  Fall Score:  Total Score: 1  Interventions: gripper socks and pt to call before getting OOB       Length of Stay:  Expected LOS: 2d 14h  Actual LOS: [address]

## 2022-11-19 NOTE — PROGRESS NOTES
Comprehensive Nutrition Assessment    Type and Reason for Visit: Initial, RD nutrition re-screen/LOS    Nutrition Recommendations/Plan:   Continue regular diet     Malnutrition Assessment:  Malnutrition Status:  No malnutrition (11/19/22 1512)        Nutrition Assessment:     Chart reviewed for LOS. Pt noted for acute bilateral pulmonary emboli, left renal vein thrombosis, persistent fevers, concern for Lupus, anemia, SIRS, cholelithiasis. Good appetite documented. Pt receiving a regular diet without noted concerns. Weight hx appears stable. No acute nutrition concerns at this time. Patient Vitals for the past 168 hrs:   % Diet Eaten   11/17/22 1434 26 - 50%   11/17/22 1158 26 - 50%   11/17/22 0733 0%   11/16/22 1725 26 - 50%   11/15/22 1756 51 - 75%   11/15/22 0837 51 - 75%   11/13/22 1741 26 - 50%   11/13/22 1200 1 - 25%   11/13/22 0936 26 - 50%       Wt Readings from Last 5 Encounters:   11/18/22 116 kg (255 lb 11.7 oz)   06/07/22 117.9 kg (260 lb)   02/28/22 121 kg (266 lb 12.8 oz)   05/07/19 108.1 kg (238 lb 6.4 oz)   ]    Nutrition Related Findings:     Labs reviewed. Meds: zithromax, cefepime, miralax. Trace edema BLE. BM 11/18. Wound Type: None    Current Nutrition Intake & Therapies:  Average Meal Intake: 26-50%  Average Supplement Intake: None ordered  ADULT DIET Regular  DIET NPO    Anthropometric Measures:  Height: 5' 4\" (162.6 cm)  Ideal Body Weight (IBW): 120 lbs (55 kg)     Current Body Wt:  116 kg (255 lb 11.7 oz), 213.1 % IBW.  Standing scale  Current BMI (kg/m2): 43.9        Weight Adjustment: No adjustment                 BMI Category: Obese class 3 (BMI 40.0 or greater)    Estimated Daily Nutrient Needs:  Energy Requirements Based On: Kcal/kg  Weight Used for Energy Requirements: Current  Energy (kcal/day): 4888-2103 kcals (12-14 kcals/kg)  Weight Used for Protein Requirements: Ideal  Protein (g/day): 83g (1.5g/kg IBW)  Method Used for Fluid Requirements: 1 ml/kcal  Fluid (ml/day): 1600mL    Nutrition Diagnosis:   No nutrition diagnosis at this time     Nutrition Interventions:   Food and/or Nutrient Delivery: Continue current diet  Nutrition Education/Counseling: No recommendations at this time  Coordination of Nutrition Care: No recommendation at this time       Goals:     Goals: PO intake 75% or greater, by next RD assessment       Nutrition Monitoring and Evaluation:   Behavioral-Environmental Outcomes: None identified  Food/Nutrient Intake Outcomes: Food and nutrient intake  Physical Signs/Symptoms Outcomes: Biochemical data, GI status, Fluid status or edema, Weight, Skin    Discharge Planning:    Continue current diet    Yosef Soliz, [address]  Contact: [phone]

## 2022-11-19 NOTE — PROGRESS NOTES
Problem: Falls - Risk of  Goal: *Absence of Falls  Description: Document Alisson George Fall Risk and appropriate interventions in the flowsheet.   Outcome: Progressing Towards Goal  Note: Fall Risk Interventions:            Medication Interventions: Patient to call before getting OOB         History of Falls Interventions: Bed/chair exit alarm         Problem: Patient Education: Go to Patient Education Activity  Goal: Patient/Family Education  Outcome: Progressing Towards Goal     Problem: Pulmonary Embolism Care Plan (Adult)  Goal: *Improvement of existing pulmonary embolism  Outcome: Progressing Towards Goal  Goal: *Absence of bleeding  Outcome: Progressing Towards Goal  Goal: *Labs within defined limits  Outcome: Progressing Towards Goal     Problem: Patient Education: Go to Patient Education Activity  Goal: Patient/Family Education  Outcome: Progressing Towards Goal

## 2022-11-19 NOTE — PROGRESS NOTES
0700  adEnd of Shift Note    Bedside shift change report given to  (oncoming nurse) by Lesvia Deal RN (offgoing nurse). Report included the following information SBAR, Kardex, ED Summary, Intake/Output, MAR, Accordion, Recent Results, and Cardiac Rhythm Sinus    Shift worked:  7p to 7a     Shift summary and any significant changes:     No significant changes     Concerns for physician to address:       Zone phone for oncoming shift:          Activity:  Activity Level: Up ad leticia  Number times ambulated in hallways past shift: 0  Number of times OOB to chair past shift: 2    Cardiac:   Cardiac Monitoring: Yes      Cardiac Rhythm: Sinus Rhythm    Access:  Current line(s): PIV     Genitourinary:   Urinary status: voiding    Respiratory:   O2 Device: None (Room air)  Chronic home O2 use?: NO  Incentive spirometer at bedside: NO       GI:  Last Bowel Movement Date: 11/18/22  Current diet:  ADULT DIET Regular  DIET NPO  Passing flatus: YES  Tolerating current diet: YES       Pain Management:   Patient states pain is manageable on current regimen: YES    Skin:  Gaurav Score: 22  Interventions: increase time out of bed, limit briefs, and nutritional support     Patient Safety:  Fall Score:  Total Score: 1  Interventions: pt to call before getting OOB       Length of Stay:  Expected LOS: 2d 14h  Actual LOS: Tad Mckeon, RN no

## 2022-11-20 LAB
ANION GAP SERPL CALC-SCNC: 6 MMOL/L (ref 5–15)
APTT PPP: 36.7 SEC (ref 22.1–31)
BASOPHILS # BLD: 0 K/UL (ref 0–0.1)
BASOPHILS # BLD: 0 K/UL (ref 0–0.1)
BASOPHILS NFR BLD: 0 % (ref 0–1)
BASOPHILS NFR BLD: 1 % (ref 0–1)
BUN SERPL-MCNC: 7 MG/DL (ref 6–20)
BUN/CREAT SERPL: 15 (ref 12–20)
CALCIUM SERPL-MCNC: 7.8 MG/DL (ref 8.5–10.1)
CHLORIDE SERPL-SCNC: 109 MMOL/L (ref 97–108)
CO2 SERPL-SCNC: 23 MMOL/L (ref 21–32)
CREAT SERPL-MCNC: 0.48 MG/DL (ref 0.55–1.02)
DIFFERENTIAL METHOD BLD: ABNORMAL
DIFFERENTIAL METHOD BLD: ABNORMAL
EOSINOPHIL # BLD: 0 K/UL (ref 0–0.4)
EOSINOPHIL # BLD: 0 K/UL (ref 0–0.4)
EOSINOPHIL NFR BLD: 1 % (ref 0–7)
EOSINOPHIL NFR BLD: 1 % (ref 0–7)
ERYTHROCYTE [DISTWIDTH] IN BLOOD BY AUTOMATED COUNT: 13.1 % (ref 11.5–14.5)
ERYTHROCYTE [DISTWIDTH] IN BLOOD BY AUTOMATED COUNT: 13.2 % (ref 11.5–14.5)
GLUCOSE SERPL-MCNC: 97 MG/DL (ref 65–100)
HCT VFR BLD AUTO: 30.8 % (ref 35–47)
HCT VFR BLD AUTO: 46.3 % (ref 35–47)
HGB BLD-MCNC: 10.4 G/DL (ref 11.5–16)
HGB BLD-MCNC: 15.7 G/DL (ref 11.5–16)
IMM GRANULOCYTES # BLD AUTO: 0 K/UL (ref 0–0.04)
IMM GRANULOCYTES # BLD AUTO: 0.1 K/UL (ref 0–0.04)
IMM GRANULOCYTES NFR BLD AUTO: 1 % (ref 0–0.5)
IMM GRANULOCYTES NFR BLD AUTO: 2 % (ref 0–0.5)
LYMPHOCYTES # BLD: 0.4 K/UL (ref 0.8–3.5)
LYMPHOCYTES # BLD: 0.7 K/UL (ref 0.8–3.5)
LYMPHOCYTES NFR BLD: 26 % (ref 12–49)
LYMPHOCYTES NFR BLD: 28 % (ref 12–49)
MCH RBC QN AUTO: 27.7 PG (ref 26–34)
MCH RBC QN AUTO: 27.9 PG (ref 26–34)
MCHC RBC AUTO-ENTMCNC: 33.8 G/DL (ref 30–36.5)
MCHC RBC AUTO-ENTMCNC: 33.9 G/DL (ref 30–36.5)
MCV RBC AUTO: 81.8 FL (ref 80–99)
MCV RBC AUTO: 82.6 FL (ref 80–99)
MONOCYTES # BLD: 0.2 K/UL (ref 0–1)
MONOCYTES # BLD: 0.2 K/UL (ref 0–1)
MONOCYTES NFR BLD: 10 % (ref 5–13)
MONOCYTES NFR BLD: 7 % (ref 5–13)
NEUTS SEG # BLD: 0.9 K/UL (ref 1.8–8)
NEUTS SEG # BLD: 1.8 K/UL (ref 1.8–8)
NEUTS SEG NFR BLD: 59 % (ref 32–75)
NEUTS SEG NFR BLD: 64 % (ref 32–75)
NRBC # BLD: 0 K/UL (ref 0–0.01)
NRBC # BLD: 0 K/UL (ref 0–0.01)
NRBC BLD-RTO: 0 PER 100 WBC
NRBC BLD-RTO: 0 PER 100 WBC
PLATELET # BLD AUTO: 170 K/UL (ref 150–400)
PLATELET # BLD AUTO: 285 K/UL (ref 150–400)
PLATELET COMMENTS,PCOM: ABNORMAL
PMV BLD AUTO: 11.7 FL (ref 8.9–12.9)
PMV BLD AUTO: 13.2 FL (ref 8.9–12.9)
POTASSIUM SERPL-SCNC: 3.8 MMOL/L (ref 3.5–5.1)
RBC # BLD AUTO: 3.73 M/UL (ref 3.8–5.2)
RBC # BLD AUTO: 5.66 M/UL (ref 3.8–5.2)
RBC MORPH BLD: ABNORMAL
RBC MORPH BLD: ABNORMAL
SODIUM SERPL-SCNC: 138 MMOL/L (ref 136–145)
THERAPEUTIC RANGE,PTTT: ABNORMAL SECS (ref 58–77)
WBC # BLD AUTO: 1.5 K/UL (ref 3.6–11)
WBC # BLD AUTO: 2.8 K/UL (ref 3.6–11)

## 2022-11-20 PROCEDURE — 36415 COLL VENOUS BLD VENIPUNCTURE: CPT

## 2022-11-20 PROCEDURE — 65660000001 HC RM ICU INTERMED STEPDOWN

## 2022-11-20 PROCEDURE — 74011000250 HC RX REV CODE- 250: Performed by: INTERNAL MEDICINE

## 2022-11-20 PROCEDURE — 74011250636 HC RX REV CODE- 250/636: Performed by: INTERNAL MEDICINE

## 2022-11-20 PROCEDURE — 74011000258 HC RX REV CODE- 258: Performed by: INTERNAL MEDICINE

## 2022-11-20 PROCEDURE — 85730 THROMBOPLASTIN TIME PARTIAL: CPT

## 2022-11-20 PROCEDURE — 85025 COMPLETE CBC W/AUTO DIFF WBC: CPT

## 2022-11-20 PROCEDURE — 80048 BASIC METABOLIC PNL TOTAL CA: CPT

## 2022-11-20 RX ORDER — HEPARIN SODIUM 1000 [USP'U]/ML
80 INJECTION, SOLUTION INTRAVENOUS; SUBCUTANEOUS AS NEEDED
Status: DISCONTINUED | OUTPATIENT
Start: 2022-11-20 | End: 2022-11-22

## 2022-11-20 RX ORDER — HEPARIN SODIUM 1000 [USP'U]/ML
40 INJECTION, SOLUTION INTRAVENOUS; SUBCUTANEOUS AS NEEDED
Status: DISCONTINUED | OUTPATIENT
Start: 2022-11-20 | End: 2022-11-22

## 2022-11-20 RX ORDER — HEPARIN SODIUM 10000 [USP'U]/100ML
13-36 INJECTION, SOLUTION INTRAVENOUS
Status: DISCONTINUED | OUTPATIENT
Start: 2022-11-20 | End: 2022-11-22

## 2022-11-20 RX ADMIN — SODIUM CHLORIDE, PRESERVATIVE FREE 10 ML: 5 INJECTION INTRAVENOUS at 17:14

## 2022-11-20 RX ADMIN — HEPARIN SODIUM 13 UNITS/KG/HR: 10000 INJECTION, SOLUTION INTRAVENOUS at 17:07

## 2022-11-20 RX ADMIN — CEFEPIME 2 G: 2 INJECTION, POWDER, FOR SOLUTION INTRAVENOUS at 02:10

## 2022-11-20 RX ADMIN — SODIUM CHLORIDE, PRESERVATIVE FREE 10 ML: 5 INJECTION INTRAVENOUS at 21:36

## 2022-11-20 RX ADMIN — ENOXAPARIN SODIUM 120 MG: 150 INJECTION SUBCUTANEOUS at 09:08

## 2022-11-20 RX ADMIN — CEFEPIME 2 G: 2 INJECTION, POWDER, FOR SOLUTION INTRAVENOUS at 17:12

## 2022-11-20 RX ADMIN — SODIUM CHLORIDE, PRESERVATIVE FREE 10 ML: 5 INJECTION INTRAVENOUS at 05:49

## 2022-11-20 RX ADMIN — CEFEPIME 2 G: 2 INJECTION, POWDER, FOR SOLUTION INTRAVENOUS at 09:09

## 2022-11-20 NOTE — PROGRESS NOTES
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 I'm  not inclined to pursue renal biopsy under these condidtions. Can always do renal biopsy as an outpt. Recommend rheum consult. Potassium better. Recommend ECHO-      Pt has seen  for her MNG    ADDENDUM:  DW Dr. Nomey Rod who would like to proceed with renal biopsy. I'll make NPO. Order entered. Subjective:   CHIEF COMPLAINT:  \"I feel fine\" . No complaints. No N/V. No dyspnea. No pain.       Past Medical History:   Diagnosis Date    Anxiety and depression       Past Surgical History:   Procedure Laterality Date    HX WISDOM TEETH EXTRACTION       Social History     Tobacco Use    Smoking status: Never    Smokeless tobacco: Never   Substance Use Topics    Alcohol use: Yes     Comment: rarely      Family History   Problem Relation Age of Onset    No Known Problems Mother     No Known Problems Father     Cancer Sister         hodgkins lymphoma    No Known Problems Brother     Lung Cancer Maternal Grandmother     Diabetes Maternal Grandmother     Hypertension Maternal Grandmother     No Known Problems Maternal Grandfather     Cancer Paternal Grandmother     No Known Problems Paternal Grandfather No Known Allergies   Prior to Admission medications    Not on File     REVIEW OF SYSTEMS:    Sister with NHL  No n/v/cp/sob    Objective:   VITALS:    Visit Vitals  /78   Pulse 100   Temp 99.1 °F (37.3 °C)   Resp 18   Ht 5' 4\" (1.626 m)   Wt 116 kg (255 lb 11.7 oz)   SpO2 98%   BMI 43.90 kg/m²     PHYSICAL EXAM:  Gen:  []  WD []  WN  [] cachectic []  thin [x]  obese []  disheveled             []  ill apearing  []   Critical  []   Chronic    [x]  No acute distress    HEENT:   [x] NC/AT/PERRL    [] pink conjunctivae      [] pale conjunctivae                  PERRL  [] yes  [] no      [] moist mucosa    [] dry mucosa    hearing intact to voice [x] yes  [] No                 NECK:   supple [] yes  [] no        masses [] yes  [] No               thyroid  []  non tender  []  tender    RESP:   [x] CTA bilaterally/no wheezing/rhonchi/rales/crackles    [] rhonchi bilaterally - no dullness  [] wheezing   [] rhonchi   [] crackles     use of accessory muscles [] yes [x] no    CARD:   [x]  regular rate and rhythm/No murmurs/rubs/gallops    murmur  [] yes ()  [] no      Rubs  [] yes  [] no       Gallops [] yes  [] no    Rate []  regular  []  irregular        carotid bruits  [] Right  []  Left                 LE edema [] yes  [x] no           JVP  []  yes   []  no    ABD:    [x] soft/non distended/non tender/+bowel sounds/no HSM    []  Rigid    tenderness [] yes [] no   Liver enlargement  []  yes []  no                Spleen enlargement  []  yes []  no     distended []  yes [] no     bowel sound  [] hypoactive   [] hyperactive    LYMPH:    Neck []  yes []  no       Axillae []  yes []  no    SKIN:   Rashes []  yes   [x]  no    Ulcers []  yes   []  no               [] tight to palpitation    skin turgor [x]  good  [] poor  [] decreased               Cyanosis/clubbing []  yes []  no    NEUR:   [x] cranial nerves II-XII grossly intact       [] Cranial nerves deficit                 []  facial droop    []  slurred speech   [] aphasic     [] Strength normal     []  weakness  []  LUE  []   RUE/ []  LLE  []   RLE    follows commands  [x]  yes []  no           PSYCH:   insight [] poor [x] good   Alert and Oriented to  [x] person  [x] place  [x]  time                    [] depressed [] anxious [] agitated  [] lethargic [] stuporous  [] sedated     LAB DATA REVIEWED:    Recent Labs     11/20/22 0057 11/19/22 0145   WBC 2.8* 3.1*   HGB 10.4* 10.0*   HCT 30.8* 29.2*    262       Recent Labs     11/20/22 0057 11/19/22 0145 11/18/22 0319    137 135*   K 3.8 4.0 3.4*   * 107 104   CO2 23 22 22   BUN 7 7 8   CREA 0.48* 0.54* 0.67   GLU 97 92 87   CA 7.8* 7.4* 7.6*   MG  --  1.8  --    PHOS  --  2.6 2.5*       Recent Labs     11/19/22 0145 11/18/22 0319   ALT 13 10*   AP 51 52   TBILI 0.2 0.4   ALB 0.9* 1.0*   GLOB 4.6* 4.9*       No results for input(s): INR, PTP, APTT, INREXT, INREXT in the last 72 hours. Recent Labs     11/18/22 0319   TIBC 122*   PSAT 14*   FERR 717*        No results for input(s): PH, PCO2, PO2 in the last 72 hours. No results for input(s): CPK, CKMB in the last 72 hours.     No lab exists for component: TROPONINI  No results found for: GLUCPOC    Procedures: see electronic medical records for all procedures/Xrays and details which were not copied into this note but were reviewed prior to creation of Plan.    ________________________________________________________________________       ___________________________________________________  Consulting Physician: Benita Vega MD

## 2022-11-20 NOTE — PROGRESS NOTES
11/20/22 1144   Vital Signs   Temp 98.2 °F (36.8 °C)   Temp Source Oral   Pulse (Heart Rate) (!) 119   Heart Rate Source Monitor   Cardiac Rhythm Sinus Rhythm   Resp Rate 17   O2 Sat (%) 99 %   Level of Consciousness 0   /85   MAP (Monitor) 101   MAP (Calculated) 102   BP 1 Method Automatic   MEWS Score 3       MEWS score 3 d/t elevated HR. MD notified will continue to monitor patient.

## 2022-11-20 NOTE — PROGRESS NOTES
Spiritual Care Assessment/Progress Note  [facility]      NAME: Breana Oconnell      MRN: [id number]  AGE: 20 y.o.  SEX: female  Adventist Affiliation: No preference   Language: English     11/20/2022     Total Time (in minutes): 14     Spiritual Assessment begun in [facility] through conversation with:         [x]Patient        [] Family    [] Friend(s)        Reason for Consult: Initial/Spiritual assessment, patient floor     Spiritual beliefs: (Please include comment if needed)     [x] Identifies with a eric tradition:         [] Supported by a eric community:            [] Claims no spiritual orientation:           [] Seeking spiritual identity:                [] Adheres to an individual form of spirituality:           [] Not able to assess:                           Identified resources for coping:      [] Prayer                               [] Music                  [] Guided Imagery     [x] Family/friends                 [] Pet visits     [] Devotional reading                         [] Unknown     [] Other:                                                Interventions offered during this visit: (See comments for more details)    Patient Interventions: Affirmation of emotions/emotional suffering, Affirmation of eric, Catharsis/review of pertinent events in supportive environment, Iconic (affirming the presence of God/Higher Power), Initial/Spiritual assessment, patient floor           Plan of Care:     [] Support spiritual and/or cultural needs    [] Support AMD and/or advance care planning process      [] Support grieving process   [] Coordinate Rites and/or Rituals    [] Coordination with community clergy   [x] No spiritual needs identified at this time   [] Detailed Plan of Care below (See Comments)  [] Make referral to Music Therapy  [] Make referral to Pet Therapy     [] Make referral to Addiction services  [] Make referral to [facility]  [] Make referral to Spiritual Care Partner  [] No future visits requested        [x] Contact Spiritual Care for further referrals     Comments:  Reviewed chart prior to visit on [facility] unit for spiritual assessment. Patient was seated in recliner; her boyfriend was visiting. Patient indicated she is doing well today. Introduced myself and support offer by [id number] [address] [facility]. Ms Alfred Mcclellan expressed no spiritual needs or concerns at this time. She lives out near [place] and shared she occasionally attends Gnosticism. Provided listening presence and advised of ongoing availability of support.    available upon referral by staff or by patient/family request.       CLINT Figueroa, [employer], Staff Chaplain AGUSTO GRIMES [facility] Paging Service  [phone])

## 2022-11-20 NOTE — PROGRESS NOTES
Problem: Falls - Risk of  Goal: *Absence of Falls  Description: Document Arely Singleton Fall Risk and appropriate interventions in the flowsheet.   Outcome: Progressing Towards Goal  Note: Fall Risk Interventions:            Medication Interventions: Evaluate medications/consider consulting pharmacy, Teach patient to arise slowly         History of Falls Interventions: Evaluate medications/consider consulting pharmacy         Problem: Patient Education: Go to Patient Education Activity  Goal: Patient/Family Education  Outcome: Progressing Towards Goal     Problem: Pulmonary Embolism Care Plan (Adult)  Goal: *Improvement of existing pulmonary embolism  Outcome: Progressing Towards Goal  Goal: *Absence of bleeding  Outcome: Progressing Towards Goal  Goal: *Labs within defined limits  Outcome: Progressing Towards Goal

## 2022-11-20 NOTE — PROGRESS NOTES
Problem: Falls - Risk of  Goal: *Absence of Falls  Description: Document Rufus Oh Fall Risk and appropriate interventions in the flowsheet.   Outcome: Progressing Towards Goal  Note: Fall Risk Interventions:            Medication Interventions: Evaluate medications/consider consulting pharmacy, Patient to call before getting OOB, Teach patient to arise slowly         History of Falls Interventions: Evaluate medications/consider consulting pharmacy         Problem: Patient Education: Go to Patient Education Activity  Goal: Patient/Family Education  Outcome: Progressing Towards Goal     Problem: Pulmonary Embolism Care Plan (Adult)  Goal: *Improvement of existing pulmonary embolism  Outcome: Progressing Towards Goal  Goal: *Absence of bleeding  Outcome: Progressing Towards Goal  Goal: *Labs within defined limits  Outcome: Progressing Towards Goal

## 2022-11-20 NOTE — PROGRESS NOTES
Hospitalist Progress Note    NAME: Luis Alberto Mason   :  2000   MRN:  [id number]            Subjective:     Chief Complaint / Reason for Physician Visit  FU PE   Patient is eager to go home  Discussed with RN events overnight. Review of Systems:  Symptom Y/N Comments  Symptom Y/N Comments   Fever/Chills n   Chest Pain n    Poor Appetite n   Edema n    Cough n   Abdominal Pain n    Sputum n   Joint Pain n    SOB/LUNA n   Pruritis/Rash n    Nausea/vomit n   Tolerating PT/OT     Diarrhea n   Tolerating Diet     Constipation n   Other       Could NOT obtain due to:      Objective:     VITALS:   Last 24hrs VS reviewed since prior progress note. Most recent are:  Patient Vitals for the past 24 hrs:   Temp Pulse Resp BP SpO2   22 0757 -- 100 18 119/78 98 %   22 0308 99.1 °F (37.3 °C) 100 20 (!) 143/108 99 %   22 2304 98.7 °F (37.1 °C) 98 20 132/86 99 %   22 1905 99.5 °F (37.5 °C) 98 20 125/72 98 %   22 1445 98.6 °F (37 °C) 93 20 127/87 97 %   22 1038 98.6 °F (37 °C) 91 18 138/89 99 %       No intake or output data in the 24 hours ending 22 0847       PHYSICAL EXAM:  General: WD, WN. Alert, cooperative, no acute distress    EENT:  EOMI. Anicteric sclerae. MMM  Resp:  CTA bilaterally, no wheezing or rales. No accessory muscle use  CV:  Regular  rhythm,  No edema  GI:  Soft, Non distended, Non tender. +Bowel sounds  Neurologic:  Alert and oriented X 3, normal speech,   Psych:   Good insight. Not anxious nor agitated  Skin:  No rashes. No jaundice    Procedures: see electronic medical records for all procedures/Xrays and details which were not copied into this note but were reviewed prior to creation of Plan. LABS:  I reviewed today's most current labs and imaging studies.   Pertinent labs include:  Recent Labs     22  0057 22  0145 22  1624 22  0319   WBC 2.8* 3.1*  --  5.0   HGB 10.4* 10.0* 9.9* 6.8*   HCT 30.8* 29.2* 29.3* 20.8*    262  -- 287       Recent Labs     11/20/22  0057 11/19/22  0145 11/18/22  0319    137 135*   K 3.8 4.0 3.4*   * 107 104   CO2 23 22 22   GLU 97 92 87   BUN 7 7 8   CREA 0.48* 0.54* 0.67   CA 7.8* 7.4* 7.6*   MG  --  1.8  --    PHOS  --  2.6 2.5*   ALB  --  0.9* 1.0*   TBILI  --  0.2 0.4   ALT  --  13 10*         Signed: Alhaji Fischer MD        Reviewed most current lab test results and cultures  YES  Reviewed most current radiology test results   YES  Review and summation of old records today    NO  Reviewed patient's current orders and MAR    YES  PMH/SH reviewed - no change compared to H&P      Assessment / Plan:  Acute bilateral pulmonary emboli POA  Left renal vein thrombosis POA  Persistent fevers   Concern for Lupus   Acute on chronic blood loss anemia  Presents with pleuritic flank pain  LE dopplers-negative  HS troponin and pBNP-negative  COVID negative  Telemetry  Presence of left renal vein thrombosis argues for possible hypercoaguable state              Has hormonal implant for pregnancy prevention              > 300 mg protein in urine and serum albumin 1.3 raises issue of nephrotic syndrome                          Check spot urine protein/creat ratio  Consulted hematology and nephrology, expertise appreciated  Lovenox 1 mg/kg, hold for renal biopsy then transition to NOAC after renal bx. Follow up outpatient for discussion of birth control options. 11/19: Patient seen by rheum yesterday, plan is to start prednisone once pneumonia has been adequately treated. Renal biopsy still pending  11/20: Order for renal biopsy for tomorrow, keep n.p.o. Will switch Lovenox to heparin drip, will stop heparin drip 4 -6h prior to the biopsy, it is more easier to titrate down Lovenox  Spoke with IR over the phone   Acute on chronic anemia   Iron deficiency anemia  Hb dropped down to 8 from 10g on 11/17  Iron profile showed low serum iron, low TIBC and low iron saturation but elevated ferritin.   Will defer to heme-onc  Stool  occult blood negative  Hemoglobin dropped down to 6.8, a unit of PRBC ordered  Might benefit from IV iron  11/19: s/p  a unit of PRBC, repeat hemoglobin is around 10    SIRS POA , respiratory rate 38, temp 100.8  Acute bilateral patchy airspace disease POA questionable infection versus pulmonary infarction  Upper respiratory infection POA  Ruled out COVID-19 POA with fever, upper respiratory symptoms, new PEs, diarrhea  CT chest with bilateral PE, patchy bilateral airspace disease  Concern for underlying infection in addition to the pulmonary emboli              Fever could also be due to the blood clots  Rapid COVID-negative  Influenza A/B antigen testing negative  Respiratory PCR-negative  Procalcitonin 0.06 Argues against bacterial pneumonia  Ucx negative              Hold antibiotics  11/15-persistent fever. Some tachycardia. Repeated CXR-atelectasis vs pna vs pulmonary infarcts, repeated cultures, and viral panels. No new complaints of cough, etc. Started ceftriaxone and azithromycin. Continue to follow. 11/16: continue spiking fevers , ID consulted , bcx NTD   11/17: AFebrile this morning, reviewed lab work, MANUEL/anti DNA + , concern for lupus   Rheum consulted . Renal bx still on hold due to persistent fevers   11/20: afebrile      2.1 cm right thyroid nodule  Followed prior to admit, reports she had a biopsy that was benign several months ago  TSH 6.80, FT3 1.3, FT4 1.1  PCP follow up after acute illness     Cholelithiasis POA  Intermittent RUQ pain, currently non tender    40 or above Morbid obesity / Body mass index is 43.9 kg/m².     Code status: Full  Prophylaxis: Lovenox  Recommended Disposition: Home w/Family     ________________________________________________________________________  Care Plan discussed with:    Comments   Patient x    Family      RN     Care Manager     Consultant  x Nephro /IR                      Multidiciplinary team rounds were held today with , nursing, pharmacist and clinical coordinator. Patient's plan of care was discussed; medications were reviewed and discharge planning was addressed.      ________________________________________________________________________  Total NON critical care TIME: 40*  Minutes    Total CRITICAL CARE TIME Spent:   Minutes non procedure based      Comments   >50% of visit spent in counseling and coordination of care     ________________________________________________________________________  Nasrin Sheikh MD

## 2022-11-20 NOTE — PROGRESS NOTES
0730 Bedside shift change report given to [address] (oncoming nurse) by Garry Zheng (offgoing nurse). Report included the following information SBAR and Kardex. End of Shift Note    Bedside shift change report given to [address] (oncoming nurse) by Phu Menard (offgoing nurse). Report included the following information SBAR, Kardex, ED Summary, MAR, Recent Results, and Cardiac Rhythm NSR/Sinus Tach    Shift worked:  [id number]     Shift summary and any significant changes:    Patient is to be NPO at midnight for renal biopsy. Lovenox discontinued. Heparin gtt started. CBC and PTT drawn prior to start of Heparin gtt. WBC decreased to 1.5. PTT to be rechecked at 2300  Heparin to be stopped 4-6 hours prior to kidney biopsy. Patient has been afebrile throughout the day. Patient mentioned that an appointment was made to have birthcontrol removed tomorrow. Notified MD of patient's concern to have it removed. Concerns for physician to address:  None      [facility]   [facility]       Activity:  Activity Level: Up ad leticia  Number times ambulated in hallways past shift: 0  Number of times OOB to chair past shift: 3    Cardiac:   Cardiac Monitoring: Yes      Cardiac Rhythm: Sinus Rhythm    Access:  Current line(s): PIV / Endurance     Genitourinary:   Urinary status: voiding    Respiratory:   O2 Device: None (Room air)  Chronic home O2 use?: NO  Incentive spirometer at bedside: NO       GI:  Last Bowel Movement Date: 11/18/22  Current diet:  ADULT DIET Regular  DIET NPO  Passing flatus: YES  Tolerating current diet: YES       Pain Management:   Patient states pain is manageable on current regimen: N/A    Skin:  Gaurav Score: 22  Interventions: increase time out of bed    Patient Safety:  Fall Score:  Total Score: 1  Interventions: gripper socks and pt to call before getting OOB       Length of Stay:  Expected LOS: 2d 14h  Actual LOS: [facility]

## 2022-11-20 NOTE — PROGRESS NOTES
0700  adEnd of Shift Note    Bedside shift change report given to  (oncoming nurse) by Alvarado Garsia RN (offgoing nurse). Report included the following information SBAR, Kardex, ED Summary, OR Summary, Procedure Summary, Intake/Output, MAR, Accordion, and Cardiac Rhythm Sinus    Shift worked:  7p to 7a       Shift summary and any significant changes:     Temp Max 99.5 this night     Concerns for physician to address:       Zone phone for oncoming shift:          Activity:  Activity Level: Up ad leticia  Number times ambulated in hallways past shift: 0  Number of times OOB to chair past shift: 3    Cardiac:   Cardiac Monitoring: Yes      Cardiac Rhythm: Sinus Rhythm    Access:  Current line(s): PIV     Genitourinary:   Urinary status: voiding    Respiratory:   O2 Device: None (Room air)  Chronic home O2 use?: NO  Incentive spirometer at bedside: NO       GI:  Last Bowel Movement Date: 11/18/22  Current diet:  ADULT DIET Regular  DIET NPO  Passing flatus: YES  Tolerating current diet: YES       Pain Management:   Patient states pain is manageable on current regimen: YES    Skin:  Gaurav Score: 22  Interventions: increase time out of bed    Patient Safety:  Fall Score:  Total Score: 1  Interventions: bed/chair alarm and gripper socks       Length of Stay:  Expected LOS: 2d 14h  Actual LOS: [address] [place] , RN

## 2022-11-21 ENCOUNTER — APPOINTMENT (OUTPATIENT)
Dept: CT IMAGING | Age: 22
DRG: 176 | End: 2022-11-21
Attending: INTERNAL MEDICINE
Payer: COMMERCIAL

## 2022-11-21 LAB
APTT PPP: 32.8 SEC (ref 22.1–31)
APTT PPP: 78.3 SEC (ref 22.1–31)
APTT PPP: 89.3 SEC (ref 22.1–31)
BACTERIA SPEC CULT: NORMAL
BASOPHILS # BLD: 0 K/UL (ref 0–0.1)
BASOPHILS NFR BLD: 0 % (ref 0–1)
BLASTS NFR BLD MANUAL: 0 %
DIFFERENTIAL METHOD BLD: ABNORMAL
EOSINOPHIL # BLD: 0 K/UL (ref 0–0.4)
EOSINOPHIL NFR BLD: 0 % (ref 0–7)
ERYTHROCYTE [DISTWIDTH] IN BLOOD BY AUTOMATED COUNT: 12.9 % (ref 11.5–14.5)
F2 GENE MUT ANL BLD/T: NORMAL
F5 P.R506Q BLD/T QL: NORMAL
HCT VFR BLD AUTO: 33.3 % (ref 35–47)
HCT VFR BLD AUTO: 34 % (ref 35–47)
HGB BLD-MCNC: 11 G/DL (ref 11.5–16)
HGB BLD-MCNC: 11.6 G/DL (ref 11.5–16)
IMM GRANULOCYTES # BLD AUTO: 0 K/UL
IMM GRANULOCYTES NFR BLD AUTO: 0 %
INR PPP: 1 (ref 0.9–1.1)
LYMPHOCYTES # BLD: 0.5 K/UL (ref 0.8–3.5)
LYMPHOCYTES NFR BLD: 16 % (ref 12–49)
MCH RBC QN AUTO: 28.2 PG (ref 26–34)
MCHC RBC AUTO-ENTMCNC: 34.1 G/DL (ref 30–36.5)
MCV RBC AUTO: 82.5 FL (ref 80–99)
METAMYELOCYTES NFR BLD MANUAL: 1 %
MONOCYTES # BLD: 0.2 K/UL (ref 0–1)
MONOCYTES NFR BLD: 6 % (ref 5–13)
MYELOCYTES NFR BLD MANUAL: 0 %
NEUTS BAND NFR BLD MANUAL: 0 % (ref 0–6)
NEUTS SEG # BLD: 2.3 K/UL (ref 1.8–8)
NEUTS SEG NFR BLD: 77 % (ref 32–75)
NRBC # BLD: 0 K/UL (ref 0–0.01)
NRBC BLD-RTO: 0 PER 100 WBC
OTHER CELLS NFR BLD MANUAL: 0 %
PLATELET # BLD AUTO: 299 K/UL (ref 150–400)
PLATELET COMMENTS,PCOM: ABNORMAL
PMV BLD AUTO: 11.9 FL (ref 8.9–12.9)
PROMYELOCYTES NFR BLD MANUAL: 0 %
PROTHROMBIN TIME: 10.4 SEC (ref 9–11.1)
RBC # BLD AUTO: 4.12 M/UL (ref 3.8–5.2)
RBC MORPH BLD: ABNORMAL
SERVICE CMNT-IMP: NORMAL
THERAPEUTIC RANGE,PTTT: ABNORMAL SECS (ref 58–77)
WBC # BLD AUTO: 3 K/UL (ref 3.6–11)
WBC MORPH BLD: ABNORMAL

## 2022-11-21 PROCEDURE — 0TB13ZX EXCISION OF LEFT KIDNEY, PERCUTANEOUS APPROACH, DIAGNOSTIC: ICD-10-PCS | Performed by: RADIOLOGY

## 2022-11-21 PROCEDURE — 77030040395 HC NDL BIOP COAX INTRO MRTM -B

## 2022-11-21 PROCEDURE — 36415 COLL VENOUS BLD VENIPUNCTURE: CPT

## 2022-11-21 PROCEDURE — 88305 TISSUE EXAM BY PATHOLOGIST: CPT

## 2022-11-21 PROCEDURE — 74011000250 HC RX REV CODE- 250: Performed by: INTERNAL MEDICINE

## 2022-11-21 PROCEDURE — 85027 COMPLETE CBC AUTOMATED: CPT

## 2022-11-21 PROCEDURE — 74011250636 HC RX REV CODE- 250/636

## 2022-11-21 PROCEDURE — 85610 PROTHROMBIN TIME: CPT

## 2022-11-21 PROCEDURE — 85730 THROMBOPLASTIN TIME PARTIAL: CPT

## 2022-11-21 PROCEDURE — 77030014115

## 2022-11-21 PROCEDURE — 2709999900 HC NON-CHARGEABLE SUPPLY

## 2022-11-21 PROCEDURE — 77012 CT SCAN FOR NEEDLE BIOPSY: CPT

## 2022-11-21 PROCEDURE — 65660000001 HC RM ICU INTERMED STEPDOWN

## 2022-11-21 PROCEDURE — 77030014006 HC SPNG HEMSTAT J&J -A

## 2022-11-21 PROCEDURE — 77030018781

## 2022-11-21 PROCEDURE — 85018 HEMOGLOBIN: CPT

## 2022-11-21 RX ORDER — SODIUM CHLORIDE 9 MG/ML
25 INJECTION, SOLUTION INTRAVENOUS CONTINUOUS
Status: DISCONTINUED | OUTPATIENT
Start: 2022-11-21 | End: 2022-11-21

## 2022-11-21 RX ORDER — FENTANYL CITRATE 50 UG/ML
25-100 INJECTION, SOLUTION INTRAMUSCULAR; INTRAVENOUS
Status: DISCONTINUED | OUTPATIENT
Start: 2022-11-21 | End: 2022-11-21

## 2022-11-21 RX ORDER — MIDAZOLAM HYDROCHLORIDE 1 MG/ML
1-5 INJECTION, SOLUTION INTRAMUSCULAR; INTRAVENOUS
Status: DISCONTINUED | OUTPATIENT
Start: 2022-11-21 | End: 2022-11-21

## 2022-11-21 RX ORDER — LIDOCAINE HYDROCHLORIDE 10 MG/ML
10 INJECTION INFILTRATION; PERINEURAL
Status: DISPENSED | OUTPATIENT
Start: 2022-11-21 | End: 2022-11-22

## 2022-11-21 RX ADMIN — FENTANYL CITRATE 25 MCG: 50 INJECTION, SOLUTION INTRAMUSCULAR; INTRAVENOUS at 15:33

## 2022-11-21 RX ADMIN — SODIUM CHLORIDE, PRESERVATIVE FREE 10 ML: 5 INJECTION INTRAVENOUS at 05:46

## 2022-11-21 RX ADMIN — SODIUM CHLORIDE 25 ML/HR: 9 INJECTION, SOLUTION INTRAVENOUS at 15:05

## 2022-11-21 RX ADMIN — MIDAZOLAM 1 MG: 1 INJECTION INTRAMUSCULAR; INTRAVENOUS at 15:34

## 2022-11-21 RX ADMIN — SODIUM CHLORIDE, PRESERVATIVE FREE 10 ML: 5 INJECTION INTRAVENOUS at 21:59

## 2022-11-21 RX ADMIN — MIDAZOLAM 1 MG: 1 INJECTION INTRAMUSCULAR; INTRAVENOUS at 15:36

## 2022-11-21 RX ADMIN — FENTANYL CITRATE 25 MCG: 50 INJECTION, SOLUTION INTRAMUSCULAR; INTRAVENOUS at 15:36

## 2022-11-21 RX ADMIN — SODIUM CHLORIDE, PRESERVATIVE FREE 10 ML: 5 INJECTION INTRAVENOUS at 13:15

## 2022-11-21 NOTE — PROGRESS NOTES
Hospitalist Progress Note    NAME: Bridget Alvarenga   :  2000   MRN:  [id number]            Subjective:     Chief Complaint / Reason for Physician Visit  HOLLIS RICO   Getting her renal biospy today   Has been afebrile for 72h now   Discussed with RN events overnight. Review of Systems:  Symptom Y/N Comments  Symptom Y/N Comments   Fever/Chills n   Chest Pain n    Poor Appetite n   Edema n    Cough n   Abdominal Pain n    Sputum n   Joint Pain n    SOB/LUNA n   Pruritis/Rash n    Nausea/vomit n   Tolerating PT/OT     Diarrhea n   Tolerating Diet     Constipation n   Other       Could NOT obtain due to:      Objective:     VITALS:   Last 24hrs VS reviewed since prior progress note. Most recent are:  Patient Vitals for the past 24 hrs:   Temp Pulse Resp BP SpO2   22 0742 -- (!) 104 -- 139/78 99 %   22 0229 98.4 °F (36.9 °C) 90 18 (!) 130/95 100 %   22 2301 98.7 °F (37.1 °C) 85 18 (!) 132/93 100 %   22 1939 98.4 °F (36.9 °C) 93 18 136/86 98 %   22 1635 98.1 °F (36.7 °C) 91 18 131/79 98 %   22 1144 98.2 °F (36.8 °C) (!) 119 17 135/85 99 %       No intake or output data in the 24 hours ending 22 0849       PHYSICAL EXAM:  General: WD, WN. Alert, cooperative, no acute distress    EENT:  EOMI. Anicteric sclerae. MMM  Resp:  CTA bilaterally, no wheezing or rales. No accessory muscle use  CV:  Regular  rhythm,  No edema  GI:  Soft, Non distended, Non tender. +Bowel sounds  Neurologic:  Alert and oriented X 3, normal speech,   Psych:   Good insight. Not anxious nor agitated  Skin:  No rashes. No jaundice    Procedures: see electronic medical records for all procedures/Xrays and details which were not copied into this note but were reviewed prior to creation of Plan. LABS:  I reviewed today's most current labs and imaging studies.   Pertinent labs include:  Recent Labs     22  1648 22  0057 22  0145   WBC 1.5* 2.8* 3.1*   HGB 15.7 10.4* 10.0*   HCT 46.3 30.8* 29.2*    285 262       Recent Labs     11/21/22  0642 11/20/22  0057 11/19/22  0145   NA  --  138 137   K  --  3.8 4.0   CL  --  109* 107   CO2  --  23 22   GLU  --  97 92   BUN  --  7 7   CREA  --  0.48* 0.54*   CA  --  7.8* 7.4*   MG  --   --  1.8   PHOS  --   --  2.6   ALB  --   --  0.9*   TBILI  --   --  0.2   ALT  --   --  13   INR 1.0  --   --          Signed: Danish Lua MD        Reviewed most current lab test results and cultures  YES  Reviewed most current radiology test results   YES  Review and summation of old records today    NO  Reviewed patient's current orders and MAR    YES  PMH/SH reviewed - no change compared to H&P      Assessment / Plan:  Acute bilateral pulmonary emboli POA  Left renal vein thrombosis POA  Persistent fevers   Concern for Lupus   Acute on chronic blood loss anemia  Presents with pleuritic flank pain  LE dopplers-negative  HS troponin and pBNP-negative  COVID negative  Telemetry  Presence of left renal vein thrombosis argues for possible hypercoaguable state              Has hormonal implant for pregnancy prevention              > 300 mg protein in urine and serum albumin 1.3 raises issue of nephrotic syndrome                          Check spot urine protein/creat ratio  Consulted hematology and nephrology, expertise appreciated  Lovenox 1 mg/kg, hold for renal biopsy then transition to NOAC after renal bx. Follow up outpatient for discussion of birth control options. 11/19: Patient seen by rheum yesterday, plan is to start prednisone once pneumonia has been adequately treated. Renal biopsy still pending  11/20: Order for renal biopsy for tomorrow, keep n.p.o.   Will switch Lovenox to heparin drip, will stop heparin drip 4 -6h prior to the biopsy, it is more easier to titrate down Lovenox  Spoke with IR over the phone  11/21: Is getting her kidney biopsy today, restart heparin drip after the kidney biopsy  Monitor for bleeding  Will switch to a NOAC before discharge   Acute on chronic anemia   Iron deficiency anemia  Hb dropped down to 8 from 10g on 11/17  Iron profile showed low serum iron, low TIBC and low iron saturation but elevated ferritin. Will defer to heme-onc  Stool  occult blood negative  Hemoglobin dropped down to 6.8, a unit of PRBC ordered  Might benefit from IV iron  11/19: s/p  a unit of PRBC, repeat hemoglobin is around 10    SIRS POA , respiratory rate 38, temp 100.8  Acute bilateral patchy airspace disease POA questionable infection versus pulmonary infarction  Upper respiratory infection POA  Ruled out COVID-19 POA with fever, upper respiratory symptoms, new PEs, diarrhea  CT chest with bilateral PE, patchy bilateral airspace disease  Concern for underlying infection in addition to the pulmonary emboli              Fever could also be due to the blood clots  Rapid COVID-negative  Influenza A/B antigen testing negative  Respiratory PCR-negative  Procalcitonin 0.06 Argues against bacterial pneumonia  Ucx negative              Hold antibiotics  11/15-persistent fever. Some tachycardia. Repeated CXR-atelectasis vs pna vs pulmonary infarcts, repeated cultures, and viral panels. No new complaints of cough, etc. Started ceftriaxone and azithromycin. Continue to follow. 11/16: continue spiking fevers , ID consulted , bcx NTD   11/17: AFebrile this morning, reviewed lab work, MANUEL/anti DNA + , concern for lupus   Rheum consulted . Renal bx still on hold due to persistent fevers   11/20: afebrile      Leucopenia   Wbc dropped down to 1.5 , now up to 3  2.1 cm right thyroid nodule  Followed prior to admit, reports she had a biopsy that was benign several months ago  TSH 6.80, FT3 1.3, FT4 1.1  PCP follow up after acute illness     Cholelithiasis POA  Intermittent RUQ pain, currently non tender    40 or above Morbid obesity / Body mass index is 43.9 kg/m².     Code status: Full  Prophylaxis: Lovenox  Recommended Disposition: Home w/Family ________________________________________________________________________  Care Plan discussed with:    Comments   Patient x    Family      RN     Care Manager     Consultant  x                      Multidiciplinary team rounds were held today with , nursing, pharmacist and clinical coordinator. Patient's plan of care was discussed; medications were reviewed and discharge planning was addressed.      ________________________________________________________________________  Total NON critical care TIME: 35 Minutes    Total CRITICAL CARE TIME Spent:   Minutes non procedure based      Comments   >50% of visit spent in counseling and coordination of care     ________________________________________________________________________  Gertrude Foreman MD

## 2022-11-21 NOTE — PROGRESS NOTES
Transition of Care Plan:     RUR: 5%- Low Risk  HARVINDER: 11/22  Renal Biopsy  Disposition: Home   Follow up appointments: PCP, Speicalist  DME needed: none  Transportation at Discharge: Boyfriend to provide transportation  [address] or means to access home:    Pt has access  IM Medicare Letter: n/a- BS of [employer]  Caregiver Contact: Thao Verdugo - [phone]  Discharge Caregiver contacted prior to discharge? If pt desires  Care Conference needed?:        n/a     CM will continue to monitor discharge plan.       Patrizia Bird  Ext [phone]

## 2022-11-21 NOTE — PROGRESS NOTES
Problem: Falls - Risk of  Goal: *Absence of Falls  Description: Document Roberts Fail Fall Risk and appropriate interventions in the flowsheet.   Outcome: Progressing Towards Goal  Note: Fall Risk Interventions:            Medication Interventions: Bed/chair exit alarm, Patient to call before getting OOB, Teach patient to arise slowly         History of Falls Interventions: Bed/chair exit alarm, Consult care management for discharge planning, Door open when patient unattended, Room close to nurse's station         Problem: Patient Education: Go to Patient Education Activity  Goal: Patient/Family Education  Outcome: Progressing Towards Goal     Problem: Pulmonary Embolism Care Plan (Adult)  Goal: *Improvement of existing pulmonary embolism  Outcome: Progressing Towards Goal  Goal: *Absence of bleeding  Outcome: Progressing Towards Goal  Goal: *Labs within defined limits  Outcome: Progressing Towards Goal     Problem: Patient Education: Go to Patient Education Activity  Goal: Patient/Family Education  Outcome: Progressing Towards Goal     Problem: Anemia Care Plan (Adult and Pediatric)  Goal: *Labs within defined limits  Outcome: Progressing Towards Goal  Goal: *Tolerates increased activity  Outcome: Progressing Towards Goal     Problem: Patient Education: Go to Patient Education Activity  Goal: Patient/Family Education  Outcome: Progressing Towards Goal     Problem: General Medical Care Plan  Goal: *Vital signs within specified parameters  Outcome: Progressing Towards Goal  Goal: *Labs within defined limits  Outcome: Progressing Towards Goal  Goal: *Absence of infection signs and symptoms  Outcome: Progressing Towards Goal  Goal: *Optimal pain control at patient's stated goal  Outcome: Progressing Towards Goal  Goal: *Skin integrity maintained  Outcome: Progressing Towards Goal  Goal: *Fluid volume balance  Outcome: Progressing Towards Goal  Goal: *Optimize nutritional status  Outcome: Progressing Towards Goal  Goal: *Anxiety reduced or absent  Outcome: Progressing Towards Goal  Goal: *Progressive mobility and function (eg: ADL's)  Outcome: Progressing Towards Goal     Problem: Patient Education: Go to Patient Education Activity  Goal: Patient/Family Education  Outcome: Progressing Towards Goal

## 2022-11-21 NOTE — PROGRESS NOTES
Hematology Oncology Progress Note       Follow up for: bilateral PE and renal vein thrombosis. Chart notes reviewed since last visit. Case discussed with following:     Patient complains of the following:  No complaints    Additional concerns noted by the staff: temp curve improving    Patient Vitals for the past 24 hrs:   BP Temp Pulse Resp SpO2   11/21/22 0742 139/78 98.7 °F (37.1 °C) (!) 104 17 99 %   11/21/22 0229 (!) 130/95 98.4 °F (36.9 °C) 90 18 100 %   11/20/22 2301 (!) 132/93 98.7 °F (37.1 °C) 85 18 100 %   11/20/22 1939 136/86 98.4 °F (36.9 °C) 93 18 98 %   11/20/22 1635 131/79 98.1 °F (36.7 °C) 91 18 98 %   11/20/22 1144 135/85 98.2 °F (36.8 °C) (!) 119 17 99 %         Review of Systems: negative for 11 organ systems except as noted. Physical Examination:  Gen NAD  Resp no distress  Psych normal        Labs:  Recent Results (from the past 24 hour(s))   PTT    Collection Time: 11/20/22  4:48 PM   Result Value Ref Range    aPTT 36.7 (H) 22.1 - 31.0 sec    aPTT, therapeutic range     58.0 - 77.0 SECS   CBC WITH AUTOMATED DIFF    Collection Time: 11/20/22  4:48 PM   Result Value Ref Range    WBC 1.5 (L) 3.6 - 11.0 K/uL    RBC 5.66 (H) 3.80 - 5.20 M/uL    HGB 15.7 11.5 - 16.0 g/dL    HCT 46.3 35.0 - 47.0 %    MCV 81.8 80.0 - 99.0 FL    MCH 27.7 26.0 - 34.0 PG    MCHC 33.9 30.0 - 36.5 g/dL    RDW 13.2 11.5 - 14.5 %    PLATELET 354 633 - 343 K/uL    MPV 13.2 (H) 8.9 - 12.9 FL    NRBC 0.0 0  WBC    ABSOLUTE NRBC 0.00 0.00 - 0.01 K/uL    NEUTROPHILS 59 32 - 75 %    LYMPHOCYTES 28 12 - 49 %    MONOCYTES 10 5 - 13 %    EOSINOPHILS 1 0 - 7 %    BASOPHILS 1 0 - 1 %    IMMATURE GRANULOCYTES 1 (H) 0.0 - 0.5 %    ABS. NEUTROPHILS 0.9 (L) 1.8 - 8.0 K/UL    ABS. LYMPHOCYTES 0.4 (L) 0.8 - 3.5 K/UL    ABS. MONOCYTES 0.2 0.0 - 1.0 K/UL    ABS. EOSINOPHILS 0.0 0.0 - 0.4 K/UL    ABS. BASOPHILS 0.0 0.0 - 0.1 K/UL    ABS. IMM.  GRANS. 0.0 0.00 - 0.04 K/UL    DF SMEAR SCANNED      PLATELET COMMENTS Large Platelets      RBC COMMENTS SAVITA CELLS  PRESENT       PTT    Collection Time: 11/20/22 11:08 PM   Result Value Ref Range    aPTT 78.3 (H) 22.1 - 31.0 sec    aPTT, therapeutic range     58.0 - 77.0 SECS   PTT    Collection Time: 11/21/22  6:42 AM   Result Value Ref Range    aPTT 89.3 (HH) 22.1 - 31.0 sec    aPTT, therapeutic range     58.0 - 77.0 SECS   PROTHROMBIN TIME + INR    Collection Time: 11/21/22  6:42 AM   Result Value Ref Range    INR 1.0 0.9 - 1.1      Prothrombin time 10.4 9.0 - 11.1 sec       Imaging:  CTA chest, abd and pelvis 11/12/22  THYROID: 2.1 cm right thyroid nodule. Small left thyroid nodules. MEDIASTINUM/SOLANGE: Mildly enlarged bilateral axillary lymph nodes, 1.2-1.3 cm. Right paratracheal lymph nodes at the 1.1 cm. HEART/PERICARDIUM: Unremarkable. AORTA: No aneurysm. PULMONARY ARTERIES: Suboptimal contrast opacification of the pulmonary arterial  circuit, though there are bilateral lower lobe pulmonary emboli. LUNGS/PLEURA: Patchy airspace disease in the bilateral lower lobes and lingula. Small to moderate left pleural effusion. No pneumothorax. BONES: No destructive bone lesion. ADDITIONAL COMMENTS: N/A     LIVER: No mass or biliary dilatation. GALLBLADDER: Pericholecystic fluid. Possible stones/sludge within the  gallbladder. SPLEEN: Enlarged, 16.5 cm in craniocaudal dimension. No definite splenic vein  thrombus. PANCREAS: No mass or ductal dilatation. ADRENALS: No mass. KIDNEYS: Mild asymmetric enlargement of the left kidney relative to the right,  with mild left perinephric stranding. No hydronephrosis or nephrolithiasis. Subtle filling defects in the left renal vein  GI TRACT: No bowel obstruction or wall thickening  PERITONEUM: No free air or free fluid. APPENDIX: Unremarkable. Barnetta Valdez RETROPERITONEUM: No lymphadenopathy or aortic aneurysm. URINARY BLADDER: No mass or calculus. LYMPH NODES: Prominent bilateral external iliac chain and inguinal lymph nodes.   REPRODUCTIVE ORGANS: Unremarkable. FREE FLUID: None. BONES: No destructive bone lesion. IMPRESSION     1. Acute bilateral lower lobe pulmonary emboli. 2. Patchy bilateral airspace disease and small to moderate left pleural  effusion. Mildly enlarged thoracic lymph nodes. 3. Left renal vein thrombus, with slight renal enlargement and perinephric  stranding as above. 4. Splenomegaly. No definite splenic vein thrombosis. 5. Pericholecystic fluid. Possible cholelithiasis/sludge. 6. 2.1 cm right thyroid nodule. Recommend outpatient ultrasound, possibly  warranting fine-needle aspiration. Dopplers lower extremities 11/12/22     No evidence of acute deep vein thrombosis in the right common femoral, femoral, popliteal, posterior tibial, and peroneal veins. The veins were imaged in the transverse and longitudinal planes. The vessels showed normal color filling and compressibility. Doppler interrogation showed phasic and spontaneous flow. No evidence of deep vein thrombosis in the right lower extremity. No evidence of acute deep vein thrombosis in the left common femoral, femoral, popliteal, posterior tibial, and peroneal veins. The veins were imaged in the transverse and longitudinal planes. The vessels showed normal color filling and compressibility. Doppler interrogation showed phasic and spontaneous flow. No evidence of deep vein thrombosis in the left lower extremity. Assessment and Plan:   hypercoagulable state manifested by renal vein thrombosis and bilateral pulmonary emboli - Renal vein thrombosis is commonly associated with nephrotic syndrome. Some have hypothesized that the urinary losses of antithrombin III or that a decrease in protein S due to urinary loss or excess binding to increased levels of [id number] protein may be contributing factors. Antiphospholipid antibody syndrome has been documented in about 20% of recurrent renal vein thrombosis in young to middle aged adults.       The classic triad of acute flank pain, hematuria,, and sudden deterioration in renal function is seen in 10-20% of cases. More commonly, one sees a more chronic forms usually manifested by slowly worsening renal function, progressive proteinuria, and edema. Given her renal vein thrombosis and bilateral PE,  she will need to be on anticoagulation for an extended period. Fortunately her weight and creatinine are within acceptable range for a DOAC. Dr Tamera Contreras ordered the more common screening studies for VTE that have a chance of being accurate acute with an event. Acutely protein C, S, ATIII will be impacted by consumption during clot, while paradoxically sometimes going up as an acute phase reactant. Given her young age, these ideally would be checked but as noted earlier, you can lose these as part of the nephrotic syndrome. Her anticardiolipin antibody panel is negative, beta 2 glycoprotein antibodies are negative and lupus anticoagulant testing is negative, ruling out antiphospholipid antibody syndrome. The factor V Leiden and prothrombin mutation testing are pending. Her gammopathy panel reveals an M spike of 0.2 grams/dL (do not see that ESPERANZA has been ordered on serum, so will order). Her FLC ratio is normal so not a monoclonal process, making myeloma ulikley and favoring that the M protein is an MGUS.     + dxDNA - raises question of SLE. Rheumatology following    She has an implanted etonogestrel, which is a progestogen, and this may have increased her risk of VTE a bit. I have contacted [facility] and talked to one of their MDs about reviewing her method of birth control. Patient is comfortable with using an alternative method of birth control. That MD suggested that pt followup with Dr. Fairchild Simpler as outpatient to discuss potentially removing it. Fevers - urine and blood cultures pending from 11/15. Urine culture from 11/12 negative. Blood culture 11/12 no growth at 4 days.  RSV, influenza A and B negative on 11/12 and 1115. Hepatitis testing negative. Chest Xray raised concern of bibasilar atelectasis vs PNA vs pulmonary infarcts. Left pleural effusion with followup Xray recommended in 8-10 weeks to ensure resolution. Her CT scan raised the question of pericholecystic fluid, possible cholelithiasis and sludge which might prove to another possible source of her fever. With lack of pain and GI symptoms,  however, this seems less likely. If nothing pans out, with productive cough and CXray findings, pneumonia +/- pulmonary infarction are the leading contenders for cause of fever. Now that there is a supsicion of lupus, could vasculitis be causing her fever?   Currently on cefepime    Heparin on hold for kidney biopsy today, once all procedures are complete, could start on DOAC    WBC down over the weekend, will recheck

## 2022-11-21 NOTE — PROGRESS NOTES
Report given to ITALO Hernandez on [facility] at [phone]. Transport placed back to bed. Biopsy performed without complication, sedation given without complication. Patient has no complaint of pain or discomfort at this time, VSS stable on RA.  Patient tolerated PO challenge without complication.    -Patient to lay flat for 6 hours post biopsy (may get up with assistance at 2200)    -Heparin gtt to be held for 12 hours post biopsy (may restart 0400 per I.R. NP Tanya Garcia)    -Care team to assist patient back to bed from stretcher top minimize bleeding risks    Orders placed into patient chart for continuation of nursing care

## 2022-11-21 NOTE — PROGRESS NOTES
Problem: Falls - Risk of  Goal: *Absence of Falls  Description: Document Anders Cuevas Fall Risk and appropriate interventions in the flowsheet.   Outcome: Progressing Towards Goal  Note: Fall Risk Interventions:            Medication Interventions: Assess postural VS orthostatic hypotension, Patient to call before getting OOB, Teach patient to arise slowly, Bed/chair exit alarm         History of Falls Interventions: Bed/chair exit alarm         Problem: Patient Education: Go to Patient Education Activity  Goal: Patient/Family Education  Outcome: Progressing Towards Goal     Problem: Pulmonary Embolism Care Plan (Adult)  Goal: *Improvement of existing pulmonary embolism  Outcome: Progressing Towards Goal  Goal: *Absence of bleeding  Outcome: Progressing Towards Goal  Goal: *Labs within defined limits  Outcome: Progressing Towards Goal     Problem: Pulmonary Embolism Care Plan (Adult)  Goal: *Improvement of existing pulmonary embolism  Outcome: Progressing Towards Goal  Goal: *Absence of bleeding  Outcome: Progressing Towards Goal  Goal: *Labs within defined limits  Outcome: Progressing Towards Goal     Problem: Patient Education: Go to Patient Education Activity  Goal: Patient/Family Education  Outcome: Progressing Towards Goal     Problem: Anemia Care Plan (Adult and Pediatric)  Goal: *Labs within defined limits  Outcome: Progressing Towards Goal  Goal: *Tolerates increased activity  Outcome: Progressing Towards Goal     Problem: Anemia Care Plan (Adult and Pediatric)  Goal: *Labs within defined limits  Outcome: Progressing Towards Goal  Goal: *Tolerates increased activity  Outcome: Progressing Towards Goal     Problem: Patient Education: Go to Patient Education Activity  Goal: Patient/Family Education  Outcome: Progressing Towards Goal     Problem: General Medical Care Plan  Goal: *Vital signs within specified parameters  Outcome: Progressing Towards Goal  Goal: *Labs within defined limits  Outcome: Progressing Towards Goal  Goal: *Absence of infection signs and symptoms  Outcome: Progressing Towards Goal  Goal: *Optimal pain control at patient's stated goal  Outcome: Progressing Towards Goal  Goal: *Skin integrity maintained  Outcome: Progressing Towards Goal  Goal: *Fluid volume balance  Outcome: Progressing Towards Goal  Goal: *Optimize nutritional status  Outcome: Progressing Towards Goal  Goal: *Anxiety reduced or absent  Outcome: Progressing Towards Goal  Goal: *Progressive mobility and function (eg: ADL's)  Outcome: Progressing Towards Goal     Problem: Patient Education: Go to Patient Education Activity  Goal: Patient/Family Education  Outcome: Progressing Towards Goal

## 2022-11-21 NOTE — PROGRESS NOTES
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 DW pt who agrees to proceed. Potassium better. Recommend ECHO-      Pt has seen  for her MNG           Subjective:   CHIEF COMPLAINT:  \"I'm hungry\" . No complaints. No N/V. No dyspnea. No pain.       Past Medical History:   Diagnosis Date    Anxiety and depression       Past Surgical History:   Procedure Laterality Date    HX WISDOM TEETH EXTRACTION       Social History     Tobacco Use    Smoking status: Never    Smokeless tobacco: Never   Substance Use Topics    Alcohol use: Yes     Comment: rarely      Family History   Problem Relation Age of Onset    No Known Problems Mother     No Known Problems Father     Cancer Sister         hodgkins lymphoma    No Known Problems Brother     Lung Cancer Maternal Grandmother     Diabetes Maternal Grandmother     Hypertension Maternal Grandmother     No Known Problems Maternal Grandfather     Cancer Paternal Grandmother     No Known Problems Paternal Grandfather       No Known Allergies   Prior to Admission medications    Not on File     REVIEW OF SYSTEMS:    Sister with NHL  No n/v/cp/sob    Objective:   VITALS:    Visit Vitals  /78   Pulse (!) 104   Temp 98.7 °F (37.1 °C)   Resp 17   Ht 5' 4\" (1.626 m)   Wt 116 kg (255 lb 11.7 oz)   SpO2 99%   BMI 43.90 kg/m²     PHYSICAL EXAM:  Gen:  []  WD []  WN  [] cachectic []  thin [x]  obese []  disheveled             []  ill apearing  []   Critical  []   Chronic    [x]  No acute distress    HEENT:   [x] NC/AT/PERRL    [] pink conjunctivae      [] pale conjunctivae                  PERRL  [] yes  [] no      [] moist mucosa    [] dry mucosa    hearing intact to voice [x] yes  [] No                 NECK:   supple [] yes  [] no        masses [] yes  [] No               thyroid  []  non tender  []  tender    RESP:   [x] CTA bilaterally/no wheezing/rhonchi/rales/crackles    [] rhonchi bilaterally - no dullness  [] wheezing   [] rhonchi   [] crackles     use of accessory muscles [] yes [x] no    CARD:   [x]  regular rate and rhythm/No murmurs/rubs/gallops    murmur  [] yes ()  [] no      Rubs  [] yes  [] no       Gallops [] yes  [] no    Rate []  regular  []  irregular        carotid bruits  [] Right  []  Left                 LE edema [] yes  [x] no           JVP  []  yes   []  no    ABD:    [x] soft/non distended/non tender/+bowel sounds/no HSM    []  Rigid    tenderness [] yes [] no   Liver enlargement  []  yes []  no                Spleen enlargement  []  yes []  no     distended []  yes [] no     bowel sound  [] hypoactive   [] hyperactive    LYMPH:    Neck []  yes []  no       Axillae []  yes []  no    SKIN:   Rashes []  yes   [x]  no    Ulcers []  yes   []  no               [] tight to palpitation    skin turgor [x]  good  [] poor  [] decreased               Cyanosis/clubbing []  yes []  no    NEUR:   [x] cranial nerves II-XII grossly intact       [] Cranial nerves deficit                 []  facial droop    []  slurred speech   [] aphasic     [] Strength normal     []  weakness  []  LUE  []   RUE/ []  LLE  []   RLE    follows commands  [x]  yes []  no           PSYCH:   insight [] poor [x] good   Alert and Oriented to  [x] person  [x] place  [x]  time [] depressed [] anxious [] agitated  [] lethargic [] stuporous  [] sedated     LAB DATA REVIEWED:    Recent Labs     11/21/22  1026 11/20/22  1648   WBC 3.0* 1.5*   HGB 11.6 15.7   HCT 34.0* 46.3    170       Recent Labs     11/20/22  0057 11/19/22  0145    137   K 3.8 4.0   * 107   CO2 23 22   BUN 7 7   CREA 0.48* 0.54*   GLU 97 92   CA 7.8* 7.4*   MG  --  1.8   PHOS  --  2.6       Recent Labs     11/19/22  0145   ALT 13   AP 51   TBILI 0.2   ALB 0.9*   GLOB 4.6*       Recent Labs     11/21/22  0642 11/20/22  2308 11/20/22  1648   INR 1.0  --   --    PTP 10.4  --   --    APTT 89.3* 78.3* 36.7*        No results for input(s): FE, TIBC, PSAT, FERR in the last 72 hours. No results for input(s): PH, PCO2, PO2 in the last 72 hours. No results for input(s): CPK, CKMB in the last 72 hours.     No lab exists for component: TROPONINI  No results found for: GLUCPOC    Procedures: see electronic medical records for all procedures/Xrays and details which were not copied into this note but were reviewed prior to creation of Plan.    ________________________________________________________________________       ___________________________________________________  Consulting Physician: Rosa Harris MD

## 2022-11-21 NOTE — PROGRESS NOTES
0730 - Heparin drip held for one hour due to high pTT.     0830 - Patient has biopsy today, heparin drip not restarted and will continue to be held. Informed by IR that patient's pTT was too high for surgery right now and will need to be rechecked at 1330 before going to biopsy. adEnd of Shift Note    Bedside shift change report given to ITALO Velarde (oncoming nurse) by Tennille Lara RN (offgoing nurse). Report included the following information SBAR, Kardex, Intake/Output, MAR, and Recent Results    Shift worked:  day     Shift summary and any significant changes:     Heparin should be held until 0400 on 11/22. Bed rest until 10pm today  H&H at 10pm and 0400. Concerns for physician to address:  none     Zone phone for oncoming shift:          Activity:  Activity Level: Up ad leticia  Number times ambulated in hallways past shift: 0  Number of times OOB to chair past shift: 0    Cardiac:   Cardiac Monitoring: Yes      Cardiac Rhythm: Sinus Rhythm    Access:  Current line(s): PIV     Genitourinary:   Urinary status: voiding and external catheter    Respiratory:   O2 Device: None (Room air)  Chronic home O2 use?: NO  Incentive spirometer at bedside: NO       GI:  Last Bowel Movement Date: 11/18/22  Current diet:  DIET NPO  Passing flatus: YES  Tolerating current diet: YES       Pain Management:   Patient states pain is manageable on current regimen: YES    Skin:  Gaurav Score: 21  Interventions: turn team, speciality bed, float heels, and increase time out of bed    Patient Safety:  Fall Score:  Total Score: 1  Interventions: bed/chair alarm, assistive device (walker, cane, etc), gripper socks, and pt to call before getting OOB       Length of Stay:  Expected LOS: 2d 14h  Actual LOS: 9      Tennille Lara RN

## 2022-11-21 NOTE — PROGRESS NOTES
adEnd of Shift Note    Bedside shift change report given to Anam Wilkes RN (oncoming nurse) by Mona Edwards RN (offgoing nurse). Report included the following information SBAR, Kardex, ED Summary, Procedure Summary, Intake/Output, MAR, Recent Results, Med Rec Status, and Cardiac Rhythm Sinus Rhythm    Shift worked:  7p-7a     Shift summary and any significant changes:    Titrated heparin gtt per protocol. NPO at midnight. AM labs completed. No fevers overnight. Concerns for physician to address:       Zone phone for oncoming shift:          Activity:  Activity Level: Up ad leticia  Number times ambulated in hallways past shift: 0  Number of times OOB to chair past shift: 0    Cardiac:   Cardiac Monitoring: Yes      Cardiac Rhythm: Sinus Rhythm    Access:  Current line(s): PIV     Genitourinary:   Urinary status: voiding    Respiratory:   O2 Device: None (Room air)  Chronic home O2 use?: NO  Incentive spirometer at bedside: NO       GI:  Last Bowel Movement Date: 11/18/22  Current diet:  ADULT DIET Regular  DIET NPO  Passing flatus: YES  Tolerating current diet: YES       Pain Management:   Patient states pain is manageable on current regimen: YES    Skin:  Gaurav Score: 21  Interventions: increase time out of bed and limit briefs    Patient Safety:  Fall Score:  Total Score: 1  Interventions: bed/chair alarm, gripper socks, pt to call before getting OOB, and stay with me (per policy)       Length of Stay:  Expected LOS: 2d 14h  Actual LOS: [address], RN

## 2022-11-21 NOTE — PROGRESS NOTES
Spoke with RN and informed that PTT needs to be redrawn at 1330 to determine if patient is at safe therapeutic level to have renal biopsy. RN will give patient the option to have renal biopsy done tomorrow since we will be waiting on lab results today.

## 2022-11-21 NOTE — PROGRESS NOTES
Name of Procedure: renal biopsy left side with moderate sedation     Sedation medications given: fentanyl and versed     Versed: 2 mg     Fentanyl:  50 mcg     Sedation Tolerated:  well, no complications     Total Sedation Time: 20 minutes     Sedation Start: 1530  Sedation End: 1550     Vital Signs:  VSS throughout case, mild tachycardia to low 100-110's reported to NP. Fluids Removed: none     Samples sent to lab: pathology at bedside for procedure - samples sent to lab [phone] 04/25    Any complications related to procedure: none     Patient is A&Ox4, on RA, and is in NAD at this time. Patient has no complaint of pain or discomfort at this time - hemostasis ofp uncture wound achieved intra-procedure and no signs of active bleeding noted at this time. Biopsy site covered with clean dry dressing. Patient is eating crackers and drinking water at this time.      Report to be called to inpatient floor [facility] [phone]) once patient is recovered from sedation and tolerates PO challenge

## 2022-11-22 VITALS
TEMPERATURE: 97.8 F | BODY MASS INDEX: 42.27 KG/M2 | WEIGHT: 247.6 LBS | RESPIRATION RATE: 16 BRPM | DIASTOLIC BLOOD PRESSURE: 75 MMHG | HEART RATE: 118 BPM | OXYGEN SATURATION: 99 % | HEIGHT: 64 IN | SYSTOLIC BLOOD PRESSURE: 133 MMHG

## 2022-11-22 LAB
ANION GAP SERPL CALC-SCNC: 7 MMOL/L (ref 5–15)
BASOPHILS # BLD: 0 K/UL (ref 0–0.1)
BASOPHILS NFR BLD: 0 % (ref 0–1)
BLASTS NFR BLD MANUAL: 0 %
BUN SERPL-MCNC: 5 MG/DL (ref 6–20)
BUN/CREAT SERPL: 10 (ref 12–20)
C-ANCA TITR SER IF: NORMAL TITER
CALCIUM SERPL-MCNC: 8 MG/DL (ref 8.5–10.1)
CHLORIDE SERPL-SCNC: 111 MMOL/L (ref 97–108)
CO2 SERPL-SCNC: 23 MMOL/L (ref 21–32)
CREAT SERPL-MCNC: 0.52 MG/DL (ref 0.55–1.02)
DIFFERENTIAL METHOD BLD: ABNORMAL
EOSINOPHIL # BLD: 0 K/UL (ref 0–0.4)
EOSINOPHIL NFR BLD: 0 % (ref 0–7)
ERYTHROCYTE [DISTWIDTH] IN BLOOD BY AUTOMATED COUNT: 13.2 % (ref 11.5–14.5)
FOLATE SERPL-MCNC: 14.4 NG/ML (ref 5–21)
GLUCOSE SERPL-MCNC: 98 MG/DL (ref 65–100)
HCT VFR BLD AUTO: 33.4 % (ref 35–47)
HGB BLD-MCNC: 11 G/DL (ref 11.5–16)
IMM GRANULOCYTES # BLD AUTO: 0 K/UL
IMM GRANULOCYTES NFR BLD AUTO: 0 %
LYMPHOCYTES # BLD: 0.4 K/UL (ref 0.8–3.5)
LYMPHOCYTES NFR BLD: 16 % (ref 12–49)
MCH RBC QN AUTO: 27.8 PG (ref 26–34)
MCHC RBC AUTO-ENTMCNC: 32.9 G/DL (ref 30–36.5)
MCV RBC AUTO: 84.6 FL (ref 80–99)
METAMYELOCYTES NFR BLD MANUAL: 0 %
MONOCYTES # BLD: 0.2 K/UL (ref 0–1)
MONOCYTES NFR BLD: 7 % (ref 5–13)
MYELOCYTES NFR BLD MANUAL: 0 %
MYELOPEROXIDASE AB SER IA-ACNC: <0.2 UNITS (ref 0–0.9)
NEUTS BAND NFR BLD MANUAL: 0 % (ref 0–6)
NEUTS SEG # BLD: 2.2 K/UL (ref 1.8–8)
NEUTS SEG NFR BLD: 77 % (ref 32–75)
NRBC # BLD: 0 K/UL (ref 0–0.01)
NRBC BLD-RTO: 0 PER 100 WBC
OTHER CELLS NFR BLD MANUAL: 0 %
P-ANCA ATYPICAL TITR SER IF: NORMAL TITER
P-ANCA TITR SER IF: NORMAL TITER
PLATELET # BLD AUTO: 289 K/UL (ref 150–400)
PMV BLD AUTO: 11.8 FL (ref 8.9–12.9)
POTASSIUM SERPL-SCNC: 3.7 MMOL/L (ref 3.5–5.1)
PROMYELOCYTES NFR BLD MANUAL: 0 %
PROTEINASE3 AB SER IA-ACNC: <0.2 UNITS (ref 0–0.9)
RBC # BLD AUTO: 3.95 M/UL (ref 3.8–5.2)
RBC MORPH BLD: ABNORMAL
SODIUM SERPL-SCNC: 141 MMOL/L (ref 136–145)
VIT B12 SERPL-MCNC: 641 PG/ML (ref 193–986)
WBC # BLD AUTO: 2.8 K/UL (ref 3.6–11)

## 2022-11-22 PROCEDURE — 74011250637 HC RX REV CODE- 250/637: Performed by: INTERNAL MEDICINE

## 2022-11-22 PROCEDURE — 74011250636 HC RX REV CODE- 250/636: Performed by: INTERNAL MEDICINE

## 2022-11-22 PROCEDURE — 82525 ASSAY OF COPPER: CPT

## 2022-11-22 PROCEDURE — 80048 BASIC METABOLIC PNL TOTAL CA: CPT

## 2022-11-22 PROCEDURE — 84630 ASSAY OF ZINC: CPT

## 2022-11-22 PROCEDURE — 36415 COLL VENOUS BLD VENIPUNCTURE: CPT

## 2022-11-22 PROCEDURE — 74011636637 HC RX REV CODE- 636/637: Performed by: INTERNAL MEDICINE

## 2022-11-22 PROCEDURE — 82746 ASSAY OF FOLIC ACID SERUM: CPT

## 2022-11-22 PROCEDURE — 82607 VITAMIN B-12: CPT

## 2022-11-22 PROCEDURE — 74011000250 HC RX REV CODE- 250: Performed by: INTERNAL MEDICINE

## 2022-11-22 PROCEDURE — 85027 COMPLETE CBC AUTOMATED: CPT

## 2022-11-22 RX ORDER — PREDNISONE 20 MG/1
20 TABLET ORAL
Status: DISCONTINUED | OUTPATIENT
Start: 2022-11-22 | End: 2022-11-22

## 2022-11-22 RX ORDER — PREDNISONE 20 MG/1
20 TABLET ORAL
Status: DISCONTINUED | OUTPATIENT
Start: 2022-11-22 | End: 2022-11-22 | Stop reason: HOSPADM

## 2022-11-22 RX ORDER — PREDNISONE 20 MG/1
20 TABLET ORAL
Qty: 14 TABLET | Refills: 0 | Status: SHIPPED | OUTPATIENT
Start: 2022-11-22 | End: 2022-12-06

## 2022-11-22 RX ORDER — LANOLIN ALCOHOL/MO/W.PET/CERES
325 CREAM (GRAM) TOPICAL
Qty: 30 TABLET | Refills: 0 | Status: SHIPPED | OUTPATIENT
Start: 2022-11-22 | End: 2022-12-22

## 2022-11-22 RX ADMIN — PREDNISONE 20 MG: 20 TABLET ORAL at 10:40

## 2022-11-22 RX ADMIN — SODIUM CHLORIDE, PRESERVATIVE FREE 10 ML: 5 INJECTION INTRAVENOUS at 05:54

## 2022-11-22 RX ADMIN — HEPARIN SODIUM 8 UNITS/KG/HR: 10000 INJECTION, SOLUTION INTRAVENOUS at 04:02

## 2022-11-22 RX ADMIN — RIVAROXABAN 15 MG: 15 TABLET, FILM COATED ORAL at 10:39

## 2022-11-22 NOTE — PROGRESS NOTES
adEnd of Shift Note    Bedside shift change report given to RN (oncoming nurse) by Mariusz Knight RN (offgoing nurse). Report included the following information SBAR, Kardex, ED Summary, Procedure Summary, Intake/Output, MAR, Recent Results, Med Rec Status, and Cardiac Rhythm SinusRhythm    Shift worked:  7p-7a     Shift summary and any significant changes:    Q6 HH checks. Restarted heparin gtt at 0400. AM labs completed. VSS. Changed diet back to regular. Concerns for physician to address:       Zone phone for oncoming shift:          Activity:  Activity Level: Bed Rest (until 2200 d/t procedure)  Number times ambulated in hallways past shift: 0  Number of times OOB to chair past shift: 0    Cardiac:   Cardiac Monitoring: Yes      Cardiac Rhythm: Sinus Rhythm    Access:  Current line(s): PIV     Genitourinary:   Urinary status: voiding    Respiratory:   O2 Device: None (Room air)  Chronic home O2 use?: NO  Incentive spirometer at bedside: YES       GI:  Last Bowel Movement Date: 11/18/22  Current diet:  DIET NPO  Passing flatus: YES  Tolerating current diet: YES       Pain Management:   Patient states pain is manageable on current regimen: YES    Skin:  Gaurav Score: 21  Interventions: float heels, increase time out of bed, and limit briefs    Patient Safety:  Fall Score:  Total Score: 1  Interventions: bed/chair alarm, gripper socks, and pt to call before getting OOB       Length of Stay:  Expected LOS: 2d 14h  Actual LOS: Elsie Shukla, RN

## 2022-11-22 NOTE — PROGRESS NOTES
Problem: Falls - Risk of  Goal: *Absence of Falls  Description: Document Jerome Chase Fall Risk and appropriate interventions in the flowsheet.   Outcome: Progressing Towards Goal  Note: Fall Risk Interventions:            Medication Interventions: Bed/chair exit alarm, Patient to call before getting OOB, Teach patient to arise slowly         History of Falls Interventions: Bed/chair exit alarm, Consult care management for discharge planning, Door open when patient unattended, Investigate reason for fall, Room close to nurse's station, Utilize gait belt for transfer/ambulation         Problem: Patient Education: Go to Patient Education Activity  Goal: Patient/Family Education  Outcome: Progressing Towards Goal     Problem: Pulmonary Embolism Care Plan (Adult)  Goal: *Improvement of existing pulmonary embolism  Outcome: Progressing Towards Goal  Goal: *Absence of bleeding  Outcome: Progressing Towards Goal  Goal: *Labs within defined limits  Outcome: Progressing Towards Goal     Problem: Patient Education: Go to Patient Education Activity  Goal: Patient/Family Education  Outcome: Progressing Towards Goal     Problem: Anemia Care Plan (Adult and Pediatric)  Goal: *Labs within defined limits  Outcome: Progressing Towards Goal  Goal: *Tolerates increased activity  Outcome: Progressing Towards Goal     Problem: Patient Education: Go to Patient Education Activity  Goal: Patient/Family Education  Outcome: Progressing Towards Goal     Problem: General Medical Care Plan  Goal: *Vital signs within specified parameters  Outcome: Progressing Towards Goal  Goal: *Labs within defined limits  Outcome: Progressing Towards Goal  Goal: *Absence of infection signs and symptoms  Outcome: Progressing Towards Goal  Goal: *Optimal pain control at patient's stated goal  Outcome: Progressing Towards Goal  Goal: *Skin integrity maintained  Outcome: Progressing Towards Goal  Goal: *Fluid volume balance  Outcome: Progressing Towards Goal  Goal: *Optimize nutritional status  Outcome: Progressing Towards Goal  Goal: *Anxiety reduced or absent  Outcome: Progressing Towards Goal  Goal: *Progressive mobility and function (eg: ADL's)  Outcome: Progressing Towards Goal     Problem: Patient Education: Go to Patient Education Activity  Goal: Patient/Family Education  Outcome: Progressing Towards Goal

## 2022-11-22 NOTE — PROGRESS NOTES
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 H/H stable. Potassium better. Recommend ECHO-    OK for DC. Will need close follow up. I will arrange. Subjective:   CHIEF COMPLAINT:  \"Can I go home? \" . No complaints. No N/V. No dyspnea. No pain. No hematuria      Past Medical History:   Diagnosis Date    Anxiety and depression       Past Surgical History:   Procedure Laterality Date    HX WISDOM TEETH EXTRACTION       Social History     Tobacco Use    Smoking status: Never    Smokeless tobacco: Never   Substance Use Topics    Alcohol use: Yes     Comment: rarely      Family History   Problem Relation Age of Onset    No Known Problems Mother     No Known Problems Father     Cancer Sister         hodgkins lymphoma    No Known Problems Brother     Lung Cancer Maternal Grandmother     Diabetes Maternal Grandmother     Hypertension Maternal Grandmother     No Known Problems Maternal Grandfather     Cancer Paternal Grandmother     No Known Problems Paternal Grandfather       No Known Allergies   Prior to Admission medications    Medication Sig Start Date End Date Taking? Authorizing Provider   rivaroxaban (XARELTO) 15 mg tab tablet Take 1 Tablet by mouth two (2) times daily (with meals) for 42 doses. 11/22/22 12/13/22 Yes Elver Garcia MD   rivaroxaban (XARELTO) 20 mg tab tablet Take 1 Tablet by mouth daily (with dinner) for 180 days. 12/13/22 6/11/23 Yes Elver Garcia MD   predniSONE (DELTASONE) 20 mg tablet Take 1 Tablet by mouth daily (with breakfast) for 14 doses. 11/22/22 12/6/22 Yes Elver Garcia MD   ferrous sulfate 325 mg (65 mg iron) tablet Take 1 Tablet by mouth Daily (before breakfast) for 30 days.  11/22/22 12/22/22 Yes Elver Garcia MD     REVIEW OF SYSTEMS:    Sister with NHL  No n/v/cp/sob    Objective:   VITALS:    Visit Vitals  BP (!) 145/88 (BP 1 Location: Left arm, BP Patient Position: Lying)   Pulse 87   Temp 98.4 °F (36.9 °C)   Resp 16   Ht 5' 4\" (1.626 m)   Wt 112.3 kg (247 lb 9.6 oz)   SpO2 100%   BMI 42.50 kg/m²     PHYSICAL EXAM:  Gen:  []  WD []  WN  [] cachectic []  thin [x]  obese []  disheveled             []  ill apearing  []   Critical  []   Chronic    [x]  No acute distress    HEENT:   [x] NC/AT/PERRL    [] pink conjunctivae      [] pale conjunctivae                  PERRL  [] yes  [] no      [] moist mucosa    [] dry mucosa    hearing intact to voice [x] yes  [] No                 NECK:   supple [] yes  [] no        masses [] yes  [] No               thyroid  []  non tender  []  tender    RESP:   [x] CTA bilaterally/no wheezing/rhonchi/rales/crackles    [] rhonchi bilaterally - no dullness  [] wheezing   [] rhonchi   [] crackles     use of accessory muscles [] yes [x] no    CARD:   [x]  regular rate and rhythm/No murmurs/rubs/gallops    murmur  [] yes ()  [] no      Rubs  [] yes  [] no       Gallops [] yes  [] no    Rate []  regular  []  irregular        carotid bruits  [] Right  []  Left                 LE edema [] yes  [x] no           JVP  []  yes   []  no    ABD:    [x] soft/non distended/non tender/+bowel sounds/no HSM    []  Rigid    tenderness [] yes [] no   Liver enlargement  []  yes []  no                Spleen enlargement  []  yes []  no     distended []  yes [] no     bowel sound  [] hypoactive   [] hyperactive    LYMPH:    Neck []  yes []  no       Axillae []  yes []  no    SKIN:   Rashes []  yes   [x]  no    Ulcers []  yes   []  no               [] tight to palpitation    skin turgor [x]  good  [] poor  [] decreased               Cyanosis/clubbing []  yes []  no    NEUR:   [x] cranial nerves II-XII grossly intact       [] Cranial nerves deficit                 []  facial droop    []  slurred speech   [] aphasic     [] Strength normal     []  weakness  []  LUE  []   RUE/ []  LLE  []   RLE    follows commands  [x]  yes []  no           PSYCH:   insight [] poor [x] good   Alert and Oriented to  [x] person  [x] place  [x]  time                    [] depressed [] anxious [] agitated  [] lethargic [] stuporous  [] sedated     LAB DATA REVIEWED:    Recent Labs     11/22/22  0413 11/21/22  2202 11/21/22  1026   WBC 2.8*  --  3.0*   HGB 11.0* 11.0* 11.6   HCT 33.4* 33.3* 34.0*     --  299       Recent Labs     11/22/22  0413 11/20/22  0057    138   K 3.7 3.8   * 109*   CO2 23 23   BUN 5* 7   CREA 0.52* 0.48*   GLU 98 97   CA 8.0* 7.8*       No results for input(s): ALT, AP, TBIL, TBILI, ALB, GLOB, GGT, AML, LPSE in the last 72 hours. No lab exists for component: SGOT, GPT, AMYP, HLPSE    Recent Labs     11/21/22  1320 11/21/22  0642 11/20/22  2308   INR  --  1.0  --    PTP  --  10.4  --    APTT 32.8* 89.3* 78.3*        No results for input(s): FE, TIBC, PSAT, FERR in the last 72 hours. No results for input(s): PH, PCO2, PO2 in the last 72 hours. No results for input(s): CPK, CKMB in the last 72 hours. No lab exists for component: TROPONINI  No results found for: GLUCPOC    Procedures: see electronic medical records for all procedures/Xrays and details which were not copied into this note but were reviewed prior to creation of Plan. ________________________________________________________________________       ___________________________________________________  Consulting Physician: Kwaku Antu, MD

## 2022-11-22 NOTE — PROGRESS NOTES
Received notification from bedside RN about patient with regards to: s/p renal biopsy, requesting diet order   VS: /80, HR 98, RR 18, O2 sat 94% on RA    Intervention given: Adult regular diet ordered

## 2022-11-22 NOTE — PROGRESS NOTES
I have reviewed discharge instructions with the PATIENT PARENT GUARDIAN: patient. The patient verbalized understanding. Discharge medications reviewed with patient and appropriate educational materials and side effects teaching were provided. Follow-up appointments reviewed. Opportunity for questions and clarification was provided. Venous access removed without difficulty. Patient's belongings gathered and sent with patient. Patient is ready for discharge.      Pastor Jacy RN

## 2022-11-22 NOTE — PROGRESS NOTES
Hematology Oncology Progress Note       Follow up for: bilateral PE and renal vein thrombosis. Chart notes reviewed since last visit. Case discussed with following:     Patient complains of the following:  No complaints    Additional concerns noted by the staff:     Patient Vitals for the past 24 hrs:   BP Temp Pulse Resp SpO2 Weight   11/22/22 0746 (!) 145/88 98.4 °F (36.9 °C) 87 16 100 % --   11/22/22 0557 -- -- -- -- -- 112.3 kg (247 lb 9.6 oz)   11/22/22 0412 130/86 98.6 °F (37 °C) 100 18 96 % --   11/21/22 2328 123/80 98 °F (36.7 °C) 98 18 94 % --   11/21/22 1921 125/75 98.4 °F (36.9 °C) 95 18 98 % --   11/21/22 1605 132/78 -- 100 18 100 % --   11/21/22 1600 139/71 -- 98 16 100 % --   11/21/22 1555 136/66 -- 98 20 100 % --   11/21/22 1550 124/76 -- 98 20 100 % --   11/21/22 1545 132/80 -- 100 22 100 % --   11/21/22 1540 132/78 -- (!) 103 20 100 % --   11/21/22 1535 124/72 -- (!) 105 11 100 % --   11/21/22 1530 124/70 -- (!) 106 20 100 % --   11/21/22 1506 125/79 96.8 °F (36 °C) (!) 108 16 98 % --   11/21/22 1151 (!) 121/95 97.8 °F (36.6 °C) 94 16 98 % --         Review of Systems: negative for 11 organ systems except as noted.      Physical Examination:  Gen NAD  Resp no distress  Psych normal        Labs:  Recent Results (from the past 24 hour(s))   CBC WITH MANUAL DIFF    Collection Time: 11/21/22 10:26 AM   Result Value Ref Range    WBC 3.0 (L) 3.6 - 11.0 K/uL    RBC 4.12 3.80 - 5.20 M/uL    HGB 11.6 11.5 - 16.0 g/dL    HCT 34.0 (L) 35.0 - 47.0 %    MCV 82.5 80.0 - 99.0 FL    MCH 28.2 26.0 - 34.0 PG    MCHC 34.1 30.0 - 36.5 g/dL    RDW 12.9 11.5 - 14.5 %    PLATELET 136 195 - 520 K/uL    MPV 11.9 8.9 - 12.9 FL    NRBC 0.0 0  WBC    ABSOLUTE NRBC 0.00 0.00 - 0.01 K/uL    NEUTROPHILS 77 (H) 32 - 75 %    BAND NEUTROPHILS 0 0 - 6 %    LYMPHOCYTES 16 12 - 49 %    MONOCYTES 6 5 - 13 %    EOSINOPHILS 0 0 - 7 %    BASOPHILS 0 0 - 1 %    METAMYELOCYTES 1 (H) 0 %    MYELOCYTES 0 0 %    PROMYELOCYTES 0 0 %    BLASTS 0 0 %    OTHER CELL 0 0      IMMATURE GRANULOCYTES 0 %    ABS. NEUTROPHILS 2.3 1.8 - 8.0 K/UL    ABS. LYMPHOCYTES 0.5 (L) 0.8 - 3.5 K/UL    ABS. MONOCYTES 0.2 0.0 - 1.0 K/UL    ABS. EOSINOPHILS 0.0 0.0 - 0.4 K/UL    ABS. BASOPHILS 0.0 0.0 - 0.1 K/UL    ABS. IMM. GRANS. 0.0 K/UL    DF MANUAL      PLATELET COMMENTS Large Platelets      RBC COMMENTS ANISOCYTOSIS  1+        RBC COMMENTS SAVITA CELLS  PRESENT        RBC COMMENTS OVALOCYTES  PRESENT        WBC COMMENTS REACTIVE LYMPHS     PTT    Collection Time: 11/21/22  1:20 PM   Result Value Ref Range    aPTT 32.8 (H) 22.1 - 31.0 sec    aPTT, therapeutic range     58.0 - 77.0 SECS   HGB & HCT    Collection Time: 11/21/22 10:02 PM   Result Value Ref Range    HGB 11.0 (L) 11.5 - 16.0 g/dL    HCT 33.3 (L) 35.0 - 47.0 %   CBC WITH MANUAL DIFF    Collection Time: 11/22/22  4:13 AM   Result Value Ref Range    WBC 2.8 (L) 3.6 - 11.0 K/uL    RBC 3.95 3.80 - 5.20 M/uL    HGB 11.0 (L) 11.5 - 16.0 g/dL    HCT 33.4 (L) 35.0 - 47.0 %    MCV 84.6 80.0 - 99.0 FL    MCH 27.8 26.0 - 34.0 PG    MCHC 32.9 30.0 - 36.5 g/dL    RDW 13.2 11.5 - 14.5 %    PLATELET 891 119 - 258 K/uL    MPV 11.8 8.9 - 12.9 FL    NRBC 0.0 0  WBC    ABSOLUTE NRBC 0.00 0.00 - 0.01 K/uL    NEUTROPHILS 77 (H) 32 - 75 %    BAND NEUTROPHILS 0 0 - 6 %    LYMPHOCYTES 16 12 - 49 %    MONOCYTES 7 5 - 13 %    EOSINOPHILS 0 0 - 7 %    BASOPHILS 0 0 - 1 %    METAMYELOCYTES 0 0 %    MYELOCYTES 0 0 %    PROMYELOCYTES 0 0 %    BLASTS 0 0 %    OTHER CELL 0 0      IMMATURE GRANULOCYTES 0 %    ABS. NEUTROPHILS 2.2 1.8 - 8.0 K/UL    ABS. LYMPHOCYTES 0.4 (L) 0.8 - 3.5 K/UL    ABS. MONOCYTES 0.2 0.0 - 1.0 K/UL    ABS. EOSINOPHILS 0.0 0.0 - 0.4 K/UL    ABS. BASOPHILS 0.0 0.0 - 0.1 K/UL    ABS. IMM.  GRANS. 0.0 K/UL    DF MANUAL      RBC COMMENTS NORMOCYTIC, NORMOCHROMIC     METABOLIC PANEL, BASIC    Collection Time: 11/22/22  4:13 AM   Result Value Ref Range    Sodium 141 136 - 145 mmol/L    Potassium 3.7 3.5 - 5.1 mmol/L    Chloride 111 (H) 97 - 108 mmol/L    CO2 23 21 - 32 mmol/L    Anion gap 7 5 - 15 mmol/L    Glucose 98 65 - 100 mg/dL    BUN 5 (L) 6 - 20 MG/DL    Creatinine 0.52 (L) 0.55 - 1.02 MG/DL    BUN/Creatinine ratio 10 (L) 12 - 20      eGFR >60 >60 ml/min/1.73m2    Calcium 8.0 (L) 8.5 - 10.1 MG/DL       Imaging:  CTA chest, abd and pelvis 11/12/22  THYROID: 2.1 cm right thyroid nodule. Small left thyroid nodules. MEDIASTINUM/SOLANGE: Mildly enlarged bilateral axillary lymph nodes, 1.2-1.3 cm. Right paratracheal lymph nodes at the 1.1 cm. HEART/PERICARDIUM: Unremarkable. AORTA: No aneurysm. PULMONARY ARTERIES: Suboptimal contrast opacification of the pulmonary arterial  circuit, though there are bilateral lower lobe pulmonary emboli. LUNGS/PLEURA: Patchy airspace disease in the bilateral lower lobes and lingula. Small to moderate left pleural effusion. No pneumothorax. BONES: No destructive bone lesion. ADDITIONAL COMMENTS: N/A     LIVER: No mass or biliary dilatation. GALLBLADDER: Pericholecystic fluid. Possible stones/sludge within the  gallbladder. SPLEEN: Enlarged, 16.5 cm in craniocaudal dimension. No definite splenic vein  thrombus. PANCREAS: No mass or ductal dilatation. ADRENALS: No mass. KIDNEYS: Mild asymmetric enlargement of the left kidney relative to the right,  with mild left perinephric stranding. No hydronephrosis or nephrolithiasis. Subtle filling defects in the left renal vein  GI TRACT: No bowel obstruction or wall thickening  PERITONEUM: No free air or free fluid. APPENDIX: Unremarkable. Pinky Angles RETROPERITONEUM: No lymphadenopathy or aortic aneurysm. URINARY BLADDER: No mass or calculus. LYMPH NODES: Prominent bilateral external iliac chain and inguinal lymph nodes. REPRODUCTIVE ORGANS: Unremarkable. FREE FLUID: None. BONES: No destructive bone lesion. IMPRESSION     1. Acute bilateral lower lobe pulmonary emboli.   2. Patchy bilateral airspace disease and small to moderate left pleural  effusion. Mildly enlarged thoracic lymph nodes. 3. Left renal vein thrombus, with slight renal enlargement and perinephric  stranding as above. 4. Splenomegaly. No definite splenic vein thrombosis. 5. Pericholecystic fluid. Possible cholelithiasis/sludge. 6. 2.1 cm right thyroid nodule. Recommend outpatient ultrasound, possibly  warranting fine-needle aspiration. Dopplers lower extremities 11/12/22     No evidence of acute deep vein thrombosis in the right common femoral, femoral, popliteal, posterior tibial, and peroneal veins. The veins were imaged in the transverse and longitudinal planes. The vessels showed normal color filling and compressibility. Doppler interrogation showed phasic and spontaneous flow. No evidence of deep vein thrombosis in the right lower extremity. No evidence of acute deep vein thrombosis in the left common femoral, femoral, popliteal, posterior tibial, and peroneal veins. The veins were imaged in the transverse and longitudinal planes. The vessels showed normal color filling and compressibility. Doppler interrogation showed phasic and spontaneous flow. No evidence of deep vein thrombosis in the left lower extremity. Assessment and Plan:   hypercoagulable state manifested by renal vein thrombosis and bilateral pulmonary emboli - Renal vein thrombosis is commonly associated with nephrotic syndrome. Some have hypothesized that the urinary losses of antithrombin III or that a decrease in protein S due to urinary loss or excess binding to increased levels of [id number] protein may be contributing factors. Antiphospholipid antibody syndrome has been documented in about 20% of recurrent renal vein thrombosis in young to middle aged adults. The classic triad of acute flank pain, hematuria,, and sudden deterioration in renal function is seen in 10-20% of cases.  More commonly, one sees a more chronic forms usually manifested by slowly worsening renal function, progressive proteinuria, and edema. Given her renal vein thrombosis and bilateral PE,  she will need to be on anticoagulation for an extended period. Fortunately her weight and creatinine are within acceptable range for a DOAC. Dr Lizz Mcqueen ordered the more common screening studies for VTE that have a chance of being accurate acute with an event. Acutely protein C, S, ATIII will be impacted by consumption during clot, while paradoxically sometimes going up as an acute phase reactant. Given her young age, these ideally would be checked but as noted earlier, you can lose these as part of the nephrotic syndrome. Her anticardiolipin antibody panel is negative, beta 2 glycoprotein antibodies are negative and lupus anticoagulant testing is negative, ruling out antiphospholipid antibody syndrome. The factor V Leiden and prothrombin mutation testing are negative, Her gammopathy panel reveals an M spike of 0.2 grams/dL (do not see that ESPERANZA has been ordered on serum, so will order). Her FLC ratio is normal so not a monoclonal process, making myeloma ulikley and favoring that the M protein is an MGUS.     + dxDNA - raises question of SLE. Rheumatology following    She has an implanted etonogestrel, which is a progestogen, and this may have increased her risk of VTE a bit. I have contacted [facility] and talked to one of their MDs about reviewing her method of birth control. Patient is comfortable with using an alternative method of birth control. That MD suggested that pt followup with Dr. Ese Villarreal as outpatient to discuss potentially removing it. Fevers - urine and blood cultures pending from 11/15. Urine culture from 11/12 negative. Blood culture 11/12 no growth at 4 days. RSV, influenza A and B negative on 11/12 and 1115. Hepatitis testing negative. Chest Xray raised concern of bibasilar atelectasis vs PNA vs pulmonary infarcts.  Left pleural effusion with followup Xray recommended in 8-10 weeks to ensure resolution. Her CT scan raised the question of pericholecystic fluid, possible cholelithiasis and sludge which might prove to another possible source of her fever. With lack of pain and GI symptoms,  however, this seems less likely. If nothing pans out, with productive cough and CXray findings, pneumonia +/- pulmonary infarction are the leading contenders for cause of fever. Now that there is a supsicion of lupus, could vasculitis be causing her fever?   Currently on cefepime    If all procedures done would recommend starting her on DOAC    WBC better than over the weekend, will send off B12/folate, copper, zinc  -Can follow up with Dr. Pop Rodriguez, will set her up with appointment

## 2022-11-22 NOTE — PROGRESS NOTES
CM acknowledges d/c order, pt to return home on today with outpatient follow up. AVS updated with outpatient follow up recommendations. CM acknowledges consult to check Xarelto pricing, called [facility] at [place]. 20mg $756.78  15 mg $542.28    CM provided bedside RN with 2 Xarelto discount cards: 1 Card for free month and 1 card for monthly discounted rate. Per pharmacist at [facility], recommended pt bring cards to pharmacy and pharmacist will process. No further CM needs identified. Care Management Interventions  PCP Verified by CM: Yes  Mode of Transport at Discharge:  Other (see comment) (Boyfriend/family)  Transition of Care Consult (CM Consult): Discharge Planning  Discharge Durable Medical Equipment: No  Physical Therapy Consult: No  Occupational Therapy Consult: No  Speech Therapy Consult: No  Support Systems: Spouse/Significant Other, Other Family Member(s)  Confirm Follow Up Transport: Family  The Patient and/or Patient Representative was Provided with a Choice of Provider and Agrees with the Discharge Plan?: Yes  Discharge Location  Patient Expects to be Discharged to[de-identified] [address] [place] [address] [address]

## 2022-11-22 NOTE — DISCHARGE SUMMARY
Hospitalist Discharge Summary     Patient ID:  José Miguel Delaney  [id number]  20 y.o.  2000  11/12/2022    PCP on record: Louann Colin NP    Admit date: 11/12/2022  Discharge date and time: 11/22/2022    DISCHARGE DIAGNOSIS:  Acute bilateral pulmonary emboli POA  Left renal vein thrombosis POA  Persistent fevers   Concern for Lupus   Acute on chronic blood loss anemia   Acute on chronic anemia   Iron deficiency anemia  SIRS POA , respiratory rate 38, temp 100.8  Acute bilateral patchy airspace disease POA questionable infection versus pulmonary infarction  Upper respiratory infection POA  Ruled out COVID-19 POA    2.1 cm right thyroid nodule  Cholelithiasis POA      CONSULTATIONS:  IP CONSULT TO HOSPITALIST  IP CONSULT TO NEPHROLOGY  IP CONSULT TO HEMATOLOGY  IP CONSULT TO RHEUMATOLOGY    Excerpted HPI from H&P of Jyoti Holliday MD:  CHIEF COMPLAINT:      HISTORY OF PRESENT ILLNESS:     20 y.o. female      Unvaccinated against COVID-19     Recent left ear infection several weeks ago  Took course of amoxacillin     Onset of left flank pain x 4 days  + Sinus congestion  Patient states she was seen a few days ago at an urgent care and diagnosed with muscle strain of the back. Diagnosed with sinus infection, started on doxycycline. Prescription for Robaxin as a muscle relaxer. Increasing left flank lower flank/thoracic area, sharp, 6 out of 10 in intensity since then    Worse with certain positions as well as taking a deep breath. Mild LUNA  Cough but no hemoptysyis  No Fevers, Headache, SSCP, abdominal pain, nausea, vomiting, melena, BRBPR  + 4 loose stools today     Came to ED due to the worsening left back pain     ED  , RR to 35  Temp 100.8  WBC 9.2  PLTs 103,000  PT INR 1.0, PTT 30  Serum albumin 1.3, creatinine 0.86  UA with > 300 mg protein, nitrite neg, 10-20 WBC, 10-20 RBC, 3+ bacteria, many squames  CTA chest and CT abdomen/pelvis IMPRESSION  1.  Acute bilateral lower lobe pulmonary emboli. 2. Patchy bilateral airspace disease and small to moderate left pleural  effusion. Mildly enlarged thoracic lymph nodes. 3. Left renal vein thrombus, with slight renal enlargement and perinephric  stranding as above. 4. Splenomegaly. No definite splenic vein thrombosis. 5. Pericholecystic fluid. Possible cholelithiasis/sludge. 6. 2.1 cm right thyroid nodule. Rapid COVID-19 negative  Lovenox 1 mg/kg  We were called to admit the patient  ______________________________________________________________________  DISCHARGE SUMMARY/HOSPITAL COURSE:  for full details see H&P, daily progress notes, labs, consult notes. Acute bilateral pulmonary emboli POA  Left renal vein thrombosis POA  Persistent fevers   Concern for Lupus   Acute on chronic blood loss anemia  Presents with pleuritic flank pain  LE dopplers-negative  HS troponin and pBNP-negative  COVID negative  Telemetry  Presence of left renal vein thrombosis argues for possible hypercoaguable state              Has hormonal implant for pregnancy prevention              > 300 mg protein in urine and serum albumin 1.3 raises issue of nephrotic syndrome                          Check spot urine protein/creat ratio  Consulted hematology and nephrology, expertise appreciated  Lovenox 1 mg/kg, hold for renal biopsy then transition to NOAC after renal bx. Pt was transitioned to Xarelto  Patient needs to follow-up with hematology  Discussed with rheumatology, advised to start prednisone 20 mg daily for 2 weeks with outpatient follow-up in 2 weeks  Patient  was instructed to follow-up with nephrology, to discuss about renal biopsy results  Was instructed to be compliant with her blood thinners     Acute on chronic anemia   Iron deficiency anemia  Hb dropped down to 8 from 10g on 11/17  Iron profile showed low serum iron, low TIBC and low iron saturation but elevated ferritin.   Will defer to heme-onc  Stool  occult blood negative  Hemoglobin dropped down to 6.8, s/p a unit of PRBC with hemoglobin at 10  Discharge on p.o. iron     SIRS POA , respiratory rate 38, temp 100.8  Acute bilateral patchy airspace disease POA questionable infection versus pulmonary infarction  Upper respiratory infection POA  Ruled out COVID-19 POA with fever, upper respiratory symptoms, new PEs, diarrhea  CT chest with bilateral PE, patchy bilateral airspace disease  Concern for underlying infection in addition to the pulmonary emboli              Fever could also be due to the blood clots  Rapid COVID-negative  Influenza A/B antigen testing negative  Respiratory PCR-negative  Procalcitonin 0.06 Argues against bacterial pneumonia  Ucx negative       Status post IV antibiotics, with improvement of symptoms  Afebrile for more than 72 hours           2.1 cm right thyroid nodule  Followed prior to admit, reports she had a biopsy that was benign several months ago  TSH 6.80, FT3 1.3, FT4 1.1  PCP follow up after acute illness     Cholelithiasis POA  Intermittent RUQ pain, currently non tender           _______________________________________________________________________  Patient seen and examined by me on discharge day. Pertinent Findings:  Gen:    Not in distress  Chest: Clear lungs  CVS:   Regular rhythm. No edema  Abd:  Soft, not distended, not tender  Neuro:  Alert, oriented x3  _______________________________________________________________________  DISCHARGE MEDICATIONS:   Current Discharge Medication List        START taking these medications    Details   !! rivaroxaban (XARELTO) 15 mg tab tablet Take 1 Tablet by mouth two (2) times daily (with meals) for 42 doses. Qty: 42 Tablet, Refills: 0  Start date: 11/22/2022, End date: 12/13/2022      !! rivaroxaban (XARELTO) 20 mg tab tablet Take 1 Tablet by mouth daily (with dinner) for 180 days.   Qty: 30 Tablet, Refills: 5  Start date: 12/13/2022, End date: 6/11/2023      predniSONE (DELTASONE) 20 mg tablet Take 1 Tablet by mouth daily (with breakfast) for 14 doses. Qty: 14 Tablet, Refills: 0  Start date: 11/22/2022, End date: 12/6/2022      ferrous sulfate 325 mg (65 mg iron) tablet Take 1 Tablet by mouth Daily (before breakfast) for 30 days. Qty: 30 Tablet, Refills: 0  Start date: 11/22/2022, End date: 12/22/2022       !! - Potential duplicate medications found. Please discuss with provider. Patient Follow Up Instructions:    Activity: Activity as tolerated  Diet: Regular Diet  Wound Care: None needed      Follow-up Information       Follow up With Specialties Details Why Contact Info    Alvaro Johnson MD Rheumatology Internal Medicine Schedule an appointment as soon as possible for a visit in 2 week(s)  [address] [facility] [phone]      Pennie Ambriz MD Nephrology Schedule an appointment as soon as possible for a visit in 1 week(s)  [address]   [address]  [phone]      Joie Tenorio MD Hematology and Oncology, Hematology Physician, Oncology Schedule an appointment as soon as possible for a visit in 2 week(s)  [address]   [address]  [facility]  [phone]            ________________________________________________________________    Risk of deterioration: High    Condition at Discharge:  Stable  __________________________________________________________________    Disposition  Home with family, no needs    ____________________________________________________________________    Code Status: Full Code  ___________________________________________________________________      Total time in minutes spent coordinating this discharge (includes going over instructions, follow-up, prescriptions, and preparing report for sign off to her PCP) :  >30 minutes    Signed:  Gertrude Foreman MD

## 2022-11-22 NOTE — DISCHARGE INSTRUCTIONS
DISCHARGE DIAGNOSIS:  Acute bilateral pulmonary emboli POA  Left renal vein thrombosis POA  Persistent fevers   Concern for Lupus   Acute on chronic blood loss anemia   Acute on chronic anemia   Iron deficiency anemia  SIRS POA , respiratory rate 38, temp 100.8  Acute bilateral patchy airspace disease POA questionable infection versus pulmonary infarction  Upper respiratory infection POA  Ruled out COVID-19 POA    2.1 cm right thyroid nodule  Cholelithiasis POA       MEDICATIONS:  It is important that you take the medication exactly as they are prescribed. Keep your medication in the bottles provided by the pharmacist and keep a list of the medication names, dosages, and times to be taken in your wallet. Do not take other medications without consulting your doctor. Pain Management: per above medications    What to do at Home    Recommended diet:  Regular Diet    Recommended activity: Activity as tolerated    If you have questions regarding the hospital related prescriptions or hospital related issues please call [address] [place] [address] 190 at . You can always direct your questions to your primary care doctor if you are unable to reach your hospital physician; your PCP works as an extension of your hospital doctor just like your hospital doctor is an extension of your PCP for your time at the hospital [facility]).     If you experience any of the following symptoms then please call your primary care physician or return to the emergency room if you cannot get hold of your doctor:  Fever, chills, nausea, vomiting, diarrhea, change in mentation, falling, bleeding, shortness of breath

## 2022-11-24 LAB
ANA SPECKLED TITR SER: ABNORMAL {TITER}
ANA TITR SER IF: POSITIVE {TITER}
COPPER SERPL-MCNC: 144 UG/DL (ref 80–158)
IGA SERPL-MCNC: 355 MG/DL (ref 87–352)
IGG SERPL-MCNC: 1009 MG/DL (ref 586–1602)
IGM SERPL-MCNC: 77 MG/DL (ref 26–217)
NOTE:, 016270: ABNORMAL
PROT PATTERN SERPL IFE-IMP: ABNORMAL
ZINC SERPL-MCNC: 79 UG/DL (ref 44–115)

## 2022-11-29 ENCOUNTER — OFFICE VISIT (OUTPATIENT)
Dept: FAMILY MEDICINE CLINIC | Age: 22
End: 2022-11-29
Payer: COMMERCIAL

## 2022-11-29 VITALS
BODY MASS INDEX: 42.75 KG/M2 | HEIGHT: 64 IN | OXYGEN SATURATION: 99 % | RESPIRATION RATE: 20 BRPM | SYSTOLIC BLOOD PRESSURE: 130 MMHG | TEMPERATURE: 97.5 F | HEART RATE: 95 BPM | WEIGHT: 250.4 LBS | DIASTOLIC BLOOD PRESSURE: 80 MMHG

## 2022-11-29 DIAGNOSIS — I26.99 BILATERAL PULMONARY EMBOLISM (HCC): ICD-10-CM

## 2022-11-29 DIAGNOSIS — D50.0 IRON DEFICIENCY ANEMIA DUE TO CHRONIC BLOOD LOSS: ICD-10-CM

## 2022-11-29 DIAGNOSIS — I82.3 THROMBOSIS OF LEFT RENAL VEIN (HCC): ICD-10-CM

## 2022-11-29 DIAGNOSIS — Z09 HOSPITAL DISCHARGE FOLLOW-UP: Primary | ICD-10-CM

## 2022-11-29 PROCEDURE — 99214 OFFICE O/P EST MOD 30 MIN: CPT

## 2022-11-29 PROCEDURE — 1111F DSCHRG MED/CURRENT MED MERGE: CPT

## 2022-11-29 RX ORDER — METHOCARBAMOL 500 MG/1
500 TABLET, FILM COATED ORAL 4 TIMES DAILY
COMMUNITY

## 2022-11-29 NOTE — PROGRESS NOTES
Transitional Care Management Progress Note    Patient: Arabella Cornejo  : 2000  PCP: Ignacio Lee NP    Date of office visit: 2022   Date of admission: 22  Date of discharge: 22  Hospital: [facility]    Call initiated w/i 2 business dates of discharge: *No response documented in the last 14 days   Date of the most recent call to the patient: *No documented post hospital discharge outreach found in the last 14 days      Assessment/Plan:   Diagnoses and all orders for this visit:    1. Hospital discharge follow-up  -     VT DISCHARGE MEDS RECONCILED W/ CURRENT OUTPATIENT MED LIST    2. Bilateral pulmonary embolism ([address]    3. Thrombosis of left renal vein (HCC)    4. Iron deficiency anemia due to chronic blood loss    The complexity of medical decision making for this patient's transitional care is high   Follow-up and Dispositions    Return in about 3 months (around 2023). Subjective: Arabella Cornejo is a 20 y.o. female presenting today for follow-up after hospital discharge. This encounter and supporting documentation was reviewed if available. Medication reconciliation was performed today. The main problem requiring admission was bilateral pulmonary embolism, left renal vein thrombosis, SIRS. Complications during admission: concern for SLE, anemia requiring transfusion      Interval history/Current status: Doing well with Xarelto, no bleeding concerns; has follow-up with specialists next week, including hematology, rheumatology, and nephrology. Admitting symptoms have: significantly improved      Medications marked \"taking\" at this time:  Prior to Admission medications    Medication Sig Start Date End Date Taking? Authorizing Provider   methocarbamoL (ROBAXIN) 500 mg tablet Take 500 mg by mouth four (4) times daily.    Yes Provider, Historical   rivaroxaban (XARELTO) 15 mg tab tablet Take 1 Tablet by mouth two (2) times daily (with meals) for 42 doses. 11/22/22 12/13/22 Yes Lg Reynolds MD   predniSONE (DELTASONE) 20 mg tablet Take 1 Tablet by mouth daily (with breakfast) for 14 doses. 11/22/22 12/6/22 Yes Lg Reynolds MD   ferrous sulfate 325 mg (65 mg iron) tablet Take 1 Tablet by mouth Daily (before breakfast) for 30 days. 11/22/22 12/22/22 Yes Lg Reynolds MD   rivaroxaban (XARELTO) 20 mg tab tablet Take 1 Tablet by mouth daily (with dinner) for 180 days.   Patient not taking: Reported on 11/29/2022 12/13/22 6/11/23  Lg Reynolds MD        ROS:  History obtained from chart review and the patient  General ROS: negative for - chills, fatigue, fever, malaise, or sleep disturbance  Psychological ROS: negative for - anxiety, depression, hallucinations, irritability, sleep disturbances, or suicidal ideation  Hematological and Lymphatic ROS: negative  Endocrine ROS: negative  Respiratory ROS: no cough, shortness of breath, or wheezing  Cardiovascular ROS: no chest pain or dyspnea on exertion  Gastrointestinal ROS: no abdominal pain, change in bowel habits, or black or bloody stools  Genito-Urinary ROS: no dysuria, trouble voiding, or hematuria  Musculoskeletal ROS: negative  Neurological ROS: negative  Dermatological ROS: negative       Patient Active Problem List   Diagnosis Code    Right thyroid nodule E04.1    Bilateral pulmonary embolism (HCC) I26.99     No Known Allergies  Past Medical History:   Diagnosis Date    Anxiety and depression      Past Surgical History:   Procedure Laterality Date    HX WISDOM TEETH EXTRACTION       Family History   Problem Relation Age of Onset    No Known Problems Mother     No Known Problems Father     Cancer Sister         hodgkins lymphoma    No Known Problems Brother     Lung Cancer Maternal Grandmother     Diabetes Maternal Grandmother     Hypertension Maternal Grandmother     No Known Problems Maternal Grandfather     Cancer Paternal Grandmother     No Known Problems Paternal Grandfather      Social History     Tobacco Use    Smoking status: Never    Smokeless tobacco: Never   Substance Use Topics    Alcohol use: Yes     Comment: rarely          Objective:   Visit Vitals  /80 (BP 1 Location: Left upper arm, BP Patient Position: Sitting, BP Cuff Size: Large adult)   Pulse 95   Temp 97.5 °F (36.4 °C)   Resp 20   Ht 5' 4\" (1.626 m)   Wt 250 lb 6.4 oz (113.6 kg)   LMP 11/27/2022 (Exact Date)   SpO2 99%   BMI 42.98 kg/m²        Physical Examination: General appearance - alert, well appearing, and in no distress  Mental status - normal mood, behavior, speech, dress, motor activity, and thought processes  Lymphatics - no palpable lymphadenopathy  Chest - clear to auscultation, no wheezes, rales or rhonchi, symmetric air entry  Heart - normal rate, regular rhythm, normal S1, S2, no murmurs, rubs, clicks or gallops  Abdomen - soft, nontender, nondistended, no masses or organomegaly  Neurological - alert, oriented, normal speech, no focal findings or movement disorder noted  Musculoskeletal - no joint tenderness, deformity or swelling  Skin - normal coloration and turgor, no rashes, no suspicious skin lesions noted       We discussed the expected course, resolution and complications of the diagnosis(es) in detail. Medication risks, benefits, costs, interactions, and alternatives were discussed as indicated. I advised her to contact the office if her condition worsens, changes or fails to improve as anticipated. She expressed understanding with the diagnosis(es) and plan.      Filemon Starks NP

## 2022-11-29 NOTE — PROGRESS NOTES
1. \"Have you been to the ER, urgent care clinic since your last visit? Hospitalized since your last visit? \" No    2. \"Have you seen or consulted any other health care providers outside of the [address] [place] [address] since your last visit? \" No     3. For patients aged 39-70: Has the patient had a colonoscopy / FIT/ Cologuard? NA - based on age      If the patient is female:    4. For patients aged 41-77: Has the patient had a mammogram within the past 2 years? NA - based on age or sex      11. For patients aged 21-65: Has the patient had a pap smear?  Yes - no Care Gap present    Chief Complaint   Patient presents with    Hospital Follow Up     [facility] paperwork     Visit Vitals  /80 (BP 1 Location: Left upper arm, BP Patient Position: Sitting, BP Cuff Size: Large adult)   Pulse 95   Temp 97.5 °F (36.4 °C)   Resp 20   Ht 5' 4\" (1.626 m)   Wt 250 lb 6.4 oz (113.6 kg)   SpO2 99%   BMI 42.98 kg/m²

## 2023-01-25 ENCOUNTER — HOSPITAL ENCOUNTER (EMERGENCY)
Age: 23
Discharge: HOME | End: 2023-01-25
Attending: EMERGENCY MEDICINE
Payer: MEDICAID

## 2023-01-25 ENCOUNTER — APPOINTMENT (OUTPATIENT)
Dept: CT IMAGING | Age: 23
End: 2023-01-25
Attending: EMERGENCY MEDICINE
Payer: MEDICAID

## 2023-01-25 ENCOUNTER — APPOINTMENT (OUTPATIENT)
Dept: GENERAL RADIOLOGY | Age: 23
End: 2023-01-25
Attending: STUDENT IN AN ORGANIZED HEALTH CARE EDUCATION/TRAINING PROGRAM
Payer: MEDICAID

## 2023-01-25 VITALS
HEIGHT: 64 IN | WEIGHT: 282.19 LBS | HEART RATE: 116 BPM | BODY MASS INDEX: 48.18 KG/M2 | DIASTOLIC BLOOD PRESSURE: 84 MMHG | RESPIRATION RATE: 18 BRPM | TEMPERATURE: 98.4 F | OXYGEN SATURATION: 97 % | SYSTOLIC BLOOD PRESSURE: 108 MMHG

## 2023-01-25 DIAGNOSIS — R10.9 ACUTE FLANK PAIN: Primary | ICD-10-CM

## 2023-01-25 DIAGNOSIS — N30.01 ACUTE CYSTITIS WITH HEMATURIA: ICD-10-CM

## 2023-01-25 LAB
ALBUMIN SERPL-MCNC: 0.9 G/DL (ref 3.5–5)
ALBUMIN/GLOB SERPL: 0.2 (ref 1.1–2.2)
ALP SERPL-CCNC: 77 U/L (ref 45–117)
ALT SERPL-CCNC: 16 U/L (ref 12–78)
ANION GAP SERPL CALC-SCNC: 9 MMOL/L (ref 5–15)
APPEARANCE UR: ABNORMAL
AST SERPL-CCNC: 18 U/L (ref 15–37)
ATRIAL RATE: 125 BPM
BACTERIA URNS QL MICRO: ABNORMAL /HPF
BASOPHILS # BLD: 0 K/UL (ref 0–0.1)
BASOPHILS NFR BLD: 0 % (ref 0–1)
BILIRUB SERPL-MCNC: <0.1 MG/DL (ref 0.2–1)
BILIRUB UR QL: NEGATIVE
BNP SERPL-MCNC: 23 PG/ML
BUN SERPL-MCNC: 11 MG/DL (ref 6–20)
BUN/CREAT SERPL: 18 (ref 12–20)
CALCIUM SERPL-MCNC: 7.1 MG/DL (ref 8.5–10.1)
CALCULATED P AXIS, ECG09: 55 DEGREES
CALCULATED R AXIS, ECG10: 30 DEGREES
CALCULATED T AXIS, ECG11: 14 DEGREES
CHLORIDE SERPL-SCNC: 106 MMOL/L (ref 97–108)
CO2 SERPL-SCNC: 25 MMOL/L (ref 21–32)
COLOR UR: ABNORMAL
COMMENT, HOLDF: NORMAL
CREAT SERPL-MCNC: 0.6 MG/DL (ref 0.55–1.02)
DIAGNOSIS, 93000: NORMAL
DIFFERENTIAL METHOD BLD: ABNORMAL
EOSINOPHIL # BLD: 0 K/UL (ref 0–0.4)
EOSINOPHIL NFR BLD: 0 % (ref 0–7)
EPITH CASTS URNS QL MICRO: ABNORMAL /LPF
ERYTHROCYTE [DISTWIDTH] IN BLOOD BY AUTOMATED COUNT: 13.8 % (ref 11.5–14.5)
GLOBULIN SER CALC-MCNC: 4.1 G/DL (ref 2–4)
GLUCOSE SERPL-MCNC: 102 MG/DL (ref 65–100)
GLUCOSE UR STRIP.AUTO-MCNC: NEGATIVE MG/DL
HCG UR QL: NEGATIVE
HCT VFR BLD AUTO: 36.5 % (ref 35–47)
HGB BLD-MCNC: 12.4 G/DL (ref 11.5–16)
HGB UR QL STRIP: ABNORMAL
IMM GRANULOCYTES # BLD AUTO: 0.1 K/UL (ref 0–0.04)
IMM GRANULOCYTES NFR BLD AUTO: 1 % (ref 0–0.5)
KETONES UR QL STRIP.AUTO: NEGATIVE MG/DL
LEUKOCYTE ESTERASE UR QL STRIP.AUTO: ABNORMAL
LYMPHOCYTES # BLD: 0.9 K/UL (ref 0.8–3.5)
LYMPHOCYTES NFR BLD: 10 % (ref 12–49)
MCH RBC QN AUTO: 28.8 PG (ref 26–34)
MCHC RBC AUTO-ENTMCNC: 34 G/DL (ref 30–36.5)
MCV RBC AUTO: 84.7 FL (ref 80–99)
MONOCYTES # BLD: 0.6 K/UL (ref 0–1)
MONOCYTES NFR BLD: 7 % (ref 5–13)
NEUTS SEG # BLD: 7.3 K/UL (ref 1.8–8)
NEUTS SEG NFR BLD: 82 % (ref 32–75)
NITRITE UR QL STRIP.AUTO: NEGATIVE
NRBC # BLD: 0 K/UL (ref 0–0.01)
NRBC BLD-RTO: 0 PER 100 WBC
P-R INTERVAL, ECG05: 148 MS
PH UR STRIP: 6.5 (ref 5–8)
PLATELET # BLD AUTO: 166 K/UL (ref 150–400)
PMV BLD AUTO: 11.2 FL (ref 8.9–12.9)
POTASSIUM SERPL-SCNC: 3.5 MMOL/L (ref 3.5–5.1)
PROT SERPL-MCNC: 5 G/DL (ref 6.4–8.2)
PROT UR STRIP-MCNC: >300 MG/DL
Q-T INTERVAL, ECG07: 304 MS
QRS DURATION, ECG06: 72 MS
QTC CALCULATION (BEZET), ECG08: 438 MS
RBC # BLD AUTO: 4.31 M/UL (ref 3.8–5.2)
RBC #/AREA URNS HPF: ABNORMAL /HPF (ref 0–5)
SAMPLES BEING HELD,HOLD: NORMAL
SODIUM SERPL-SCNC: 140 MMOL/L (ref 136–145)
SP GR UR REFRACTOMETRY: 1.02
TROPONIN-HIGH SENSITIVITY: 5 NG/L (ref 0–51)
UA: UC IF INDICATED,UAUC: ABNORMAL
UROBILINOGEN UR QL STRIP.AUTO: 0.2 EU/DL (ref 0.2–1)
VENTRICULAR RATE, ECG03: 125 BPM
WBC # BLD AUTO: 8.9 K/UL (ref 3.6–11)
WBC URNS QL MICRO: ABNORMAL /HPF (ref 0–4)

## 2023-01-25 PROCEDURE — 74011000258 HC RX REV CODE- 258: Performed by: EMERGENCY MEDICINE

## 2023-01-25 PROCEDURE — 71046 X-RAY EXAM CHEST 2 VIEWS: CPT

## 2023-01-25 PROCEDURE — 99285 EMERGENCY DEPT VISIT HI MDM: CPT

## 2023-01-25 PROCEDURE — 85025 COMPLETE CBC W/AUTO DIFF WBC: CPT

## 2023-01-25 PROCEDURE — 96375 TX/PRO/DX INJ NEW DRUG ADDON: CPT

## 2023-01-25 PROCEDURE — 84484 ASSAY OF TROPONIN QUANT: CPT

## 2023-01-25 PROCEDURE — 96365 THER/PROPH/DIAG IV INF INIT: CPT

## 2023-01-25 PROCEDURE — 83880 ASSAY OF NATRIURETIC PEPTIDE: CPT

## 2023-01-25 PROCEDURE — 81025 URINE PREGNANCY TEST: CPT

## 2023-01-25 PROCEDURE — 96361 HYDRATE IV INFUSION ADD-ON: CPT

## 2023-01-25 PROCEDURE — 74176 CT ABD & PELVIS W/O CONTRAST: CPT

## 2023-01-25 PROCEDURE — 87086 URINE CULTURE/COLONY COUNT: CPT

## 2023-01-25 PROCEDURE — 81001 URINALYSIS AUTO W/SCOPE: CPT

## 2023-01-25 PROCEDURE — 93005 ELECTROCARDIOGRAM TRACING: CPT

## 2023-01-25 PROCEDURE — 36415 COLL VENOUS BLD VENIPUNCTURE: CPT

## 2023-01-25 PROCEDURE — 74011250636 HC RX REV CODE- 250/636: Performed by: EMERGENCY MEDICINE

## 2023-01-25 PROCEDURE — 80053 COMPREHEN METABOLIC PANEL: CPT

## 2023-01-25 RX ORDER — ONDANSETRON 4 MG/1
4 TABLET, FILM COATED ORAL
Qty: 20 TABLET | Refills: 0 | Status: SHIPPED | OUTPATIENT
Start: 2023-01-25

## 2023-01-25 RX ORDER — METHOCARBAMOL 750 MG/1
750 TABLET, FILM COATED ORAL
Qty: 20 TABLET | Refills: 0 | Status: SHIPPED | OUTPATIENT
Start: 2023-01-25

## 2023-01-25 RX ORDER — CEPHALEXIN 500 MG/1
500 CAPSULE ORAL 3 TIMES DAILY
Qty: 15 CAPSULE | Refills: 0 | Status: SHIPPED | OUTPATIENT
Start: 2023-01-25

## 2023-01-25 RX ORDER — MORPHINE SULFATE 2 MG/ML
2 INJECTION, SOLUTION INTRAMUSCULAR; INTRAVENOUS
Status: COMPLETED | OUTPATIENT
Start: 2023-01-25 | End: 2023-01-25

## 2023-01-25 RX ORDER — DICYCLOMINE HYDROCHLORIDE 20 MG/1
20 TABLET ORAL
Qty: 20 TABLET | Refills: 0 | Status: SHIPPED | OUTPATIENT
Start: 2023-01-25

## 2023-01-25 RX ORDER — ONDANSETRON 4 MG/1
4 TABLET, ORALLY DISINTEGRATING ORAL
Status: COMPLETED | OUTPATIENT
Start: 2023-01-25 | End: 2023-01-25

## 2023-01-25 RX ADMIN — MORPHINE SULFATE 2 MG: 2 INJECTION, SOLUTION INTRAMUSCULAR; INTRAVENOUS at 12:51

## 2023-01-25 RX ADMIN — CEFTRIAXONE 1 G: 1 INJECTION, POWDER, FOR SOLUTION INTRAMUSCULAR; INTRAVENOUS at 15:40

## 2023-01-25 RX ADMIN — ONDANSETRON 4 MG: 4 TABLET, ORALLY DISINTEGRATING ORAL at 12:52

## 2023-01-25 RX ADMIN — SODIUM CHLORIDE 1000 ML: 9 INJECTION, SOLUTION INTRAVENOUS at 12:52

## 2023-01-25 NOTE — ED PROVIDER NOTES
[facility] EMERGENCY DEPT  EMERGENCY DEPARTMENT ENCOUNTER       Pt Name: Josefa Matos  MRN: [id number]  [place] 2000  Date of evaluation: 1/25/2023  Provider: Yara Grossman MD   PCP: Danya Son NP  Note Started: 3:33 PM 1/25/23     CHIEF COMPLAINT       Chief Complaint   Patient presents with    Flank Pain     X2 days, left side    Chest Pain     Under left breast x2 days - patient recently dx with lupus; hx of PE        HISTORY OF PRESENT ILLNESS: 1 or more elements      History From: Patient, History limited by: none     Josefa Matos is a 20 y.o. female who presents left flank pain and pain under her left breast.  Patient symptoms started 2 days ago. She has had constant pain involving her left flank and left lower chest.  The pain has been 8 out of 10 in severity today. She denies radiation to neck, jaw or arms. She denies shortness of breath, nausea. She has had decreased urinary output. She denies cough, fever or chills. Nursing Notes were all reviewed and agreed with or any disagreements were addressed in the HPI. REVIEW OF SYSTEMS        Positives and Pertinent negatives as per HPI.     PAST HISTORY     Past Medical History:  Past Medical History:   Diagnosis Date    Anxiety and depression        Past Surgical History:  Past Surgical History:   Procedure Laterality Date    HX WISDOM TEETH EXTRACTION         Family History:  Family History   Problem Relation Age of Onset    No Known Problems Mother     No Known Problems Father     Cancer Sister         hodgkins lymphoma    No Known Problems Brother     Lung Cancer Maternal Grandmother     Diabetes Maternal Grandmother     Hypertension Maternal Grandmother     No Known Problems Maternal Grandfather     Cancer Paternal Grandmother     No Known Problems Paternal Grandfather        Social History:  Social History     Tobacco Use    Smoking status: Never    Smokeless tobacco: Never   Vaping Use    Vaping Use: Never used   Substance Use Topics Alcohol use: Yes     Comment: rarely    Drug use: Never       Allergies:  No Known Allergies    CURRENT MEDICATIONS      Previous Medications    METHOCARBAMOL (ROBAXIN) 500 MG TABLET    Take 500 mg by mouth four (4) times daily. RIVAROXABAN (XARELTO) 20 MG TAB TABLET    Take 1 Tablet by mouth daily (with dinner) for 180 days. SCREENINGS               No data recorded         PHYSICAL EXAM      ED Triage Vitals   ED Encounter Vitals Group      BP 01/25/23 1200 108/84      Pulse (Heart Rate) 01/25/23 1200 (!) 128      Resp Rate 01/25/23 1200 16      Temp 01/25/23 1200 98.4 °F (36.9 °C)      Temp src --       O2 Sat (%) 01/25/23 1200 98 %      Weight 01/25/23 1157 282 lb 3 oz      Height 01/25/23 1157 5' 4\"        Physical Exam  Vitals and nursing note reviewed. Constitutional:       General: She is not in acute distress. Appearance: She is well-developed. HENT:      Head: Normocephalic. Cardiovascular:      Rate and Rhythm: Regular rhythm. Tachycardia present. Heart sounds: Normal heart sounds. Pulmonary:      Effort: Pulmonary effort is normal.      Breath sounds: Normal breath sounds. Chest:      Chest wall: Tenderness present. Abdominal:      General: Bowel sounds are normal.      Palpations: Abdomen is soft. Tenderness: There is no abdominal tenderness. Comments: Flank tenderness, left   Musculoskeletal:         General: Normal range of motion. Cervical back: Normal range of motion and neck supple. Skin:     General: Skin is warm and dry. Neurological:      General: No focal deficit present. Mental Status: She is alert and oriented to person, place, and time.    Psychiatric:         Mood and Affect: Mood normal.         Behavior: Behavior normal.        DIAGNOSTIC RESULTS   LABS:     Recent Results (from the past 12 hour(s))   EKG, 12 LEAD, INITIAL    Collection Time: 01/25/23 12:03 PM   Result Value Ref Range    Ventricular Rate 125 BPM    Atrial Rate 125 BPM    P-R Interval 148 ms    QRS Duration 72 ms    Q-T Interval 304 ms    QTC Calculation (Bezet) 438 ms    Calculated P Axis 55 degrees    Calculated R Axis 30 degrees    Calculated T Axis 14 degrees    Diagnosis       Sinus tachycardia     Confirmed by Luciano Romero M.D. ([id number]) on 1/25/2023 3:20:47 PM     CBC WITH AUTOMATED DIFF    Collection Time: 01/25/23 12:10 PM   Result Value Ref Range    WBC 8.9 3.6 - 11.0 K/uL    RBC 4.31 3.80 - 5.20 M/uL    HGB 12.4 11.5 - 16.0 g/dL    HCT 36.5 35.0 - 47.0 %    MCV 84.7 80.0 - 99.0 FL    MCH 28.8 26.0 - 34.0 PG    MCHC 34.0 30.0 - 36.5 g/dL    RDW 13.8 11.5 - 14.5 %    PLATELET 689 098 - 050 K/uL    MPV 11.2 8.9 - 12.9 FL    NRBC 0.0 0  WBC    ABSOLUTE NRBC 0.00 0.00 - 0.01 K/uL    NEUTROPHILS 82 (H) 32 - 75 %    LYMPHOCYTES 10 (L) 12 - 49 %    MONOCYTES 7 5 - 13 %    EOSINOPHILS 0 0 - 7 %    BASOPHILS 0 0 - 1 %    IMMATURE GRANULOCYTES 1 (H) 0.0 - 0.5 %    ABS. NEUTROPHILS 7.3 1.8 - 8.0 K/UL    ABS. LYMPHOCYTES 0.9 0.8 - 3.5 K/UL    ABS. MONOCYTES 0.6 0.0 - 1.0 K/UL    ABS. EOSINOPHILS 0.0 0.0 - 0.4 K/UL    ABS. BASOPHILS 0.0 0.0 - 0.1 K/UL    ABS. IMM. GRANS. 0.1 (H) 0.00 - 0.04 K/UL    DF AUTOMATED     SAMPLES BEING HELD    Collection Time: 01/25/23 12:56 PM   Result Value Ref Range    SAMPLES BEING HELD RED     COMMENT        Add-on orders for these samples will be processed based on acceptable specimen integrity and analyte stability, which may vary by analyte.    METABOLIC PANEL, COMPREHENSIVE    Collection Time: 01/25/23  1:26 PM   Result Value Ref Range    Sodium 140 136 - 145 mmol/L    Potassium 3.5 3.5 - 5.1 mmol/L    Chloride 106 97 - 108 mmol/L    CO2 25 21 - 32 mmol/L    Anion gap 9 5 - 15 mmol/L    Glucose 102 (H) 65 - 100 mg/dL    BUN 11 6 - 20 MG/DL    Creatinine 0.60 0.55 - 1.02 MG/DL    BUN/Creatinine ratio 18 12 - 20      eGFR >60 >60 ml/min/1.73m2    Calcium 7.1 (L) 8.5 - 10.1 MG/DL    Bilirubin, total <0.1 (L) 0.2 - 1.0 MG/DL    ALT (SGPT) 16 12 - 78 U/L    AST (SGOT) 18 15 - 37 U/L    Alk. phosphatase 77 45 - 117 U/L    Protein, total 5.0 (L) 6.4 - 8.2 g/dL    Albumin 0.9 (L) 3.5 - 5.0 g/dL    Globulin 4.1 (H) 2.0 - 4.0 g/dL    A-G Ratio 0.2 (L) 1.1 - 2.2     NT-PRO BNP    Collection Time: 01/25/23  1:26 PM   Result Value Ref Range    NT pro-BNP 23 <125 PG/ML   TROPONIN-HIGH SENSITIVITY    Collection Time: 01/25/23  1:26 PM   Result Value Ref Range    Troponin-High Sensitivity 5 0 - 51 ng/L   URINALYSIS W/ REFLEX CULTURE    Collection Time: 01/25/23  2:10 PM    Specimen: Urine   Result Value Ref Range    Color YELLOW/STRAW      Appearance CLOUDY (A) CLEAR      Specific gravity 1.020      pH (UA) 6.5 5.0 - 8.0      Protein >300 (A) NEG mg/dL    Glucose Negative NEG mg/dL    Ketone Negative NEG mg/dL    Bilirubin Negative NEG      Blood SMALL (A) NEG      Urobilinogen 0.2 0.2 - 1.0 EU/dL    Nitrites Negative NEG      Leukocyte Esterase TRACE (A) NEG      WBC 10-20 0 - 4 /hpf    RBC 0-5 0 - 5 /hpf    Epithelial cells MODERATE (A) FEW /lpf    Bacteria 3+ (A) NEG /hpf    UA:UC IF INDICATED URINE CULTURE ORDERED (A) CNI          EKG: If performed, independent interpretation documented below in the MDM section     RADIOLOGY:  Non-plain film images such as CT, Ultrasound and MRI are read by the radiologist. Plain radiographic images are visualized and preliminarily interpreted by the ED Provider with the findings documented in the MDM section. Interpretation per the Radiologist below, if available at the time of this note:     XR CHEST PA LAT    Result Date: 1/25/2023  EXAM: XR CHEST PA LAT INDICATION: Chest pain COMPARISON: 11/15/2022 TECHNIQUE: PA and lateral chest views FINDINGS: The cardiac size is within normal limits. The pulmonary vasculature is within normal limits. There are small bilateral pleural effusions with bilateral lower lobe infiltrates/atelectasis. The visualized bones and upper abdomen are age-appropriate.      Small bilateral pleural effusions with bilateral lower lobe infiltrate/atelectasis. CT ABD PELV WO CONT    Result Date: 1/25/2023  EXAM: CT ABD PELV WO CONT INDICATION: Flank pain, left COMPARISON: 11/12/2022 IV CONTRAST: None. ORAL CONTRAST: None TECHNIQUE: Thin axial images were obtained through the abdomen and pelvis. Coronal and sagittal reformats were generated. CT dose reduction was achieved through use of a standardized protocol tailored for this examination and automatic exposure control for dose modulation. The absence of intravenous contrast material reduces the sensitivity for evaluation of the vasculature and solid organs. FINDINGS: LOWER THORAX: Small bilateral pleural effusions. Bibasilar subsegmental atelectasis. LIVER: No mass. BILIARY TREE: Gallbladder is within normal limits. CBD is not dilated. SPLEEN: within normal limits. PANCREAS: No focal abnormality. ADRENALS: Unremarkable. KIDNEYS/URETERS: No calculus. Left hydronephrosis and hydroureter. Moderate left renal enlargement Inflammatory change surrounding the left kidney. No calculus. STOMACH: Unremarkable. SMALL BOWEL: No dilatation or wall thickening. COLON: No dilatation or wall thickening. Moderate amount of stool throughout the colon APPENDIX: Normal on coronal image 62 PERITONEUM: No ascites or pneumoperitoneum. RETROPERITONEUM: No lymphadenopathy or aortic aneurysm. REPRODUCTIVE ORGANS: Anteverted uterus URINARY BLADDER: No mass or calculus. BONES: No destructive bone lesion. ABDOMINAL WALL: No mass or hernia. ADDITIONAL COMMENTS: N/A     Left hydronephrosis and hydroureter with associated inflammatory change.  Consider obstruction from a recently passed stone or pyelonephritis Incidental small bilateral pleural effusions, recurrent or residual Mild constipation       PROCEDURES   Unless otherwise noted below, none  Procedures     CRITICAL CARE TIME   0    EMERGENCY DEPARTMENT COURSE and DIFFERENTIAL DIAGNOSIS/MDM   Vitals:    Vitals:    01/25/23 1157 01/25/23 1200 01/25/23 1514   BP:  108/84    Pulse:  (!) 128 (!) 116   Resp:  16 18   Temp:  98.4 °F (36.9 °C)    SpO2:  98% 97%   Weight: 128 kg (282 lb 3 oz) 128 kg (282 lb 3 oz)    Height: 5' 4\" (1.626 m)          Patient was given the following medications:  Medications   cefTRIAXone (ROCEPHIN) 1 g in 0.9% sodium chloride (MBP/ADV) 50 mL MBP (has no administration in time range)   sodium chloride 0.9 % bolus infusion 1,000 mL (0 mL IntraVENous IV Completed 1/25/23 1513)   ondansetron (ZOFRAN ODT) tablet 4 mg (4 mg Oral Given 1/25/23 1252)   morphine injection 2 mg (2 mg IntraVENous Given 1/25/23 1251)       Medical Decision Making  We will get a CT and chest x-ray as well as lab work, to rule out ACS, kidney stone, pyelonephritis    Amount and/or Complexity of Data Reviewed  Labs: ordered. Radiology: ordered. ECG/medicine tests: ordered. Risk  Prescription drug management. FINAL IMPRESSION     1. Acute flank pain    2. Acute cystitis with hematuria          DISPOSITION/PLAN   Jessy Anne's  results have been reviewed with her. She has been counseled regarding her diagnosis, treatment, and plan. She verbally conveys understanding and agreement of the signs, symptoms, diagnosis, treatment and prognosis and additionally agrees to follow up as discussed. She also agrees with the care-plan and conveys that all of her questions have been answered. I have also provided discharge instructions for her that include: educational information regarding their diagnosis and treatment, and list of reasons why they would want to return to the ED prior to their follow-up appointment, should her condition change. CLINICAL IMPRESSION    Discharge Note: The patient is stable for discharge home. The signs, symptoms, diagnosis, and discharge instructions have been discussed, understanding conveyed, and agreed upon. The patient is to follow up as recommended or return to ER should their symptoms worsen. PATIENT REFERRED TO:  Follow-up Information       Follow up With Specialties Details Why Contact Info    Franci Miles NP Nurse Practitioner  As needed Malou Pritchett  [address]   [phone]      [facility] EMERGENCY DEPT Emergency Medicine  If symptoms worsen [address] [place] [address]  [phone]              DISCHARGE MEDICATIONS:  Discharge Medication List as of 1/25/2023  4:21 PM        START taking these medications    Details   cephALEXin (Keflex) 500 mg capsule Take 1 Capsule by mouth three (3) times daily. , Normal, Disp-15 Capsule, R-0      ondansetron hcl (Zofran) 4 mg tablet Take 1 Tablet by mouth every eight (8) hours as needed for Nausea., Normal, Disp-20 Tablet, R-0      dicyclomine (BENTYL) 20 mg tablet Take 1 Tablet by mouth every six (6) hours as needed for Abdominal Cramps., Normal, Disp-20 Tablet, R-0      methocarbamoL (Robaxin-750) 750 mg tablet Take 1 Tablet by mouth four (4) times daily as needed for Muscle Spasm(s). , Normal, Disp-20 Tablet, R-0           CONTINUE these medications which have NOT CHANGED    Details   rivaroxaban (XARELTO) 20 mg tab tablet Take 1 Tablet by mouth daily (with dinner) for 180 days. , Normal, Disp-30 Tablet, R-5               DISCONTINUED MEDICATIONS:  Current Discharge Medication List          I am the Primary Clinician of Record. Fara Rouse MD (electronically signed)    (Please note that parts of this dictation were completed with voice recognition software. Quite often unanticipated grammatical, syntax, homophones, and other interpretive errors are inadvertently transcribed by the computer software. Please disregards these errors.  Please excuse any errors that have escaped final proofreading.)

## 2023-01-26 LAB
BACTERIA SPEC CULT: NORMAL
CC UR VC: NORMAL
SERVICE CMNT-IMP: NORMAL

## 2023-03-28 ENCOUNTER — OFFICE VISIT (OUTPATIENT)
Dept: ENDOCRINOLOGY | Age: 23
End: 2023-03-28
Payer: COMMERCIAL

## 2023-03-28 VITALS
BODY MASS INDEX: 48.38 KG/M2 | SYSTOLIC BLOOD PRESSURE: 144 MMHG | HEIGHT: 64 IN | HEART RATE: 113 BPM | WEIGHT: 283.4 LBS | DIASTOLIC BLOOD PRESSURE: 82 MMHG

## 2023-03-28 DIAGNOSIS — E04.2 MULTINODULAR GOITER (NONTOXIC): Primary | ICD-10-CM

## 2023-03-28 DIAGNOSIS — E03.9 ACQUIRED HYPOTHYROIDISM: ICD-10-CM

## 2023-03-28 PROCEDURE — 99245 OFF/OP CONSLTJ NEW/EST HI 55: CPT | Performed by: INTERNAL MEDICINE

## 2023-03-28 RX ORDER — HYDROXYCHLOROQUINE SULFATE 200 MG/1
400 TABLET, FILM COATED ORAL DAILY
COMMUNITY
Start: 2023-03-09

## 2023-03-28 RX ORDER — BUMETANIDE 1 MG/1
2 TABLET ORAL 2 TIMES DAILY
COMMUNITY
Start: 2023-03-21

## 2023-03-28 RX ORDER — MYCOPHENOLATE MOFETIL 500 MG/1
1000 TABLET ORAL 2 TIMES DAILY
COMMUNITY
Start: 2023-02-28

## 2023-03-28 RX ORDER — PREDNISONE 20 MG/1
20 TABLET ORAL DAILY
COMMUNITY
Start: 2023-02-27

## 2023-03-28 NOTE — PROGRESS NOTES
Chief Complaint   Patient presents with    Thyroid Problem     Pcp and pharmacy verified     History of Present Illness: Tera Rocha is a 24 y.o. female who I was asked to see in consult by Dr. Tarik Hough for evaluation of multinodular goiter. I previously saw Ms Stephanie Villa for the same in May 2019. Pt had a thyroid US on 2/19/19 which showed multiple sub-CM thyroid nodules and a single 1cm thyroid cyst. Since her thyroid US only showed sub-cm nodules I did not feel she needed an FNA at that time, but I did recommend she follow up with me for a repeat US in a year. She did not keep the follow up appointment and she has not been back to see me since May 2019. In March 2022 she had an US at [address] [place] [address] and that showed that her dominate right thyroid nodule had grown from 0.9cm to 2.2 x 1.8 x 1.5. She was sent to Dr. Brendon Pelayo who did an FNA in June 2022. She reports that the FNA did not show any evidence of cancer. In November 2022 she was having issues of SOB and went to the ED. She had a CT and it showed bilateral PE and PNA. The CT also noted a right thyroid nodule of 2.1cm. \"I was recently diagnosed with Lupus and my Lupus doctor (Dr. Tarik Hough) told me to follow up with you. \"I had issues of protein in my urine and my kidney doctor (Dr. Yoan Jain) sent me to Dr. Steven Yanes. She is also seeing Dr. Moon Baron of Hematology since she had bilateral PEs. For the hx of PEs she is on Xarelto. For her Lupus she it taking Prednisone 20mg daily, Hydroxychloroquine 400mg daily and CellCept 1000mg BID. She denies issues of dysphagia or dysphonia. She denies issues of CP, she will get occasional SOB/LUNA. She denies issues of tremors, heat or cold intolerance, diarrhea or constipation.     Past Medical History:   Diagnosis Date    Anxiety and depression     History of blood clots     Lupus ([facility] [address]      Past Surgical History:   Procedure Laterality Date    HX WISDOM TEETH EXTRACTION       Current Outpatient Medications Medication Sig    hydrOXYchloroQUINE (PLAQUENIL) 200 mg tablet Take 400 mg by mouth daily. bumetanide (BUMEX) 1 mg tablet Take 2 mg by mouth two (2) times a day. predniSONE (DELTASONE) 20 mg tablet Take 20 mg by mouth daily. mycophenolate (CELLCEPT) 500 mg tablet Take 1,000 mg by mouth two (2) times a day. rivaroxaban (XARELTO) 20 mg tab tablet Take 1 Tablet by mouth daily (with dinner) for 180 days. No current facility-administered medications for this visit.      No Known Allergies  Family History   Problem Relation Age of Onset    No Known Problems Mother     No Known Problems Father     Cancer Sister         hodgkins lymphoma    No Known Problems Sister     No Known Problems Brother     Lung Cancer Maternal Grandmother     Diabetes Maternal Grandmother     Hypertension Maternal Grandmother     No Known Problems Maternal Grandfather     Cancer Paternal Grandmother     No Known Problems Paternal Grandfather      Social History     Socioeconomic History    Marital status: SINGLE     Spouse name: Not on file    Number of children: Not on file    Years of education: Not on file    Highest education level: Not on file   Occupational History    Not on file   Tobacco Use    Smoking status: Never    Smokeless tobacco: Never   Vaping Use    Vaping Use: Never used   Substance and Sexual Activity    Alcohol use: Yes     Comment: rarely    Drug use: Never    Sexual activity: Yes     Partners: Male     Birth control/protection: Implant   Other Topics Concern    Not on file   Social History Narrative    Not on file     Social Determinants of Health     Financial Resource Strain: Not on file   Food Insecurity: Not on file   Transportation Needs: Not on file   Physical Activity: Not on file   Stress: Not on file   Social Connections: Not on file   Intimate Partner Violence: Not on file   Housing Stability: Not on file     Review of Systems:  As noted in HPI    Physical Examination:  Blood pressure (!) 144/82, pulse (!) 113, height 5' 4\" (1.626 m), weight 283 lb 6.4 oz (128.5 kg). General: pleasant, no distress, good eye contact  HEENT: no exopthalmos, no periorbital edema, no scleral/conjunctival injection, EOMI, no lid lag or stare  Neck: supple, no thyromegaly, masses, lymph nodes, or carotid bruits, no supraclavicular or dorsocervical fat pads  Cardiovascular: regular, normal rate, normal S1 and S2, no murmurs/rubs/gallops   Respiratory: clear to auscultation bilaterally  Gastrointestinal: soft, nontender, nondistended, no masses, no hepatosplenomegaly  Musculoskeletal: no proximal muscle weakness in upper or lower extremities  Integumentary: + acanthosis nigricans,  no rashes, no edema  Neurological: reflexes 2+ at biceps, no tremor  Psychiatric: normal mood and affect    Data Reviewed:   INDICATION: Tachycardia, tachypnea, left flank pain     COMPARISON: None     TECHNIQUE:  CTA imaging of the chest,  and CT imaging of the abdomen and pelvis  was performed after the uneventful intravenous administration of contrast  material.  Coronal and sagittal reconstructions were generated. 3D image  postprocessing was performed. CT dose reduction was achieved through use of a  standardized protocol tailored for this examination and automatic exposure  control for dose modulation. FINDINGS:     THYROID: 2.1 cm right thyroid nodule. Small left thyroid nodules. MEDIASTINUM/SOLANGE: Mildly enlarged bilateral axillary lymph nodes, 1.2-1.3 cm. Right paratracheal lymph nodes at the 1.1 cm. HEART/PERICARDIUM: Unremarkable. AORTA: No aneurysm. PULMONARY ARTERIES: Suboptimal contrast opacification of the pulmonary arterial  circuit, though there are bilateral lower lobe pulmonary emboli. LUNGS/PLEURA: Patchy airspace disease in the bilateral lower lobes and lingula. Small to moderate left pleural effusion. No pneumothorax. BONES: No destructive bone lesion.   ADDITIONAL COMMENTS: N/A     LIVER: No mass or biliary dilatation. GALLBLADDER: Pericholecystic fluid. Possible stones/sludge within the  gallbladder. SPLEEN: Enlarged, 16.5 cm in craniocaudal dimension. No definite splenic vein  thrombus. PANCREAS: No mass or ductal dilatation. ADRENALS: No mass. KIDNEYS: Mild asymmetric enlargement of the left kidney relative to the right,  with mild left perinephric stranding. No hydronephrosis or nephrolithiasis. Subtle filling defects in the left renal vein  GI TRACT: No bowel obstruction or wall thickening  PERITONEUM: No free air or free fluid. APPENDIX: Unremarkable. Vandana Aland RETROPERITONEUM: No lymphadenopathy or aortic aneurysm. URINARY BLADDER: No mass or calculus. LYMPH NODES: Prominent bilateral external iliac chain and inguinal lymph nodes. REPRODUCTIVE ORGANS: Unremarkable. FREE FLUID: None. BONES: No destructive bone lesion. ADDITIONAL COMMENTS: N/A. IMPRESSION     1. Acute bilateral lower lobe pulmonary emboli. 2. Patchy bilateral airspace disease and small to moderate left pleural  effusion. Mildly enlarged thoracic lymph nodes. 3. Left renal vein thrombus, with slight renal enlargement and perinephric  stranding as above. 4. Splenomegaly. No definite splenic vein thrombosis. 5. Pericholecystic fluid. Possible cholelithiasis/sludge. 6. 2.1 cm right thyroid nodule. Recommend outpatient ultrasound, possibly  warranting fine-needle aspiration. The findings were called to Dr. Harpla Morris on 11/12/22 at [address] [place] [address] by myself. Component      Latest Ref Rng & Units 11/14/2022 11/13/2022 2/28/2022   TSH      0.36 - 3.74 uIU/mL  6.80 (H) 2.86   Free Triiodothyronine (T3)      2.2 - 4.0 pg/mL 1.3 (L)     T4, Free      0.8 - 1.5 NG/DL 1.1       FINDINGS:   Right thyroid lobe: 6.2 x 2.1 x 2.2 cm. Normal background echogenicity and   perfusion. Solid, hypoechoic mid nodule with smooth margins, wider than tall   and lacking hyperechoic foci measures 2.2 x 1.8 x 1.5 cm previously about 0.9 x   0.9 x 0.7 cm.    Left thyroid lobe: 6 x 1.2 x 2.0 cm. Normal background echogenicity and   perfusion. Chronic isodense solid medial lower pole nodule today 8 8 x 6 mm,   previously about 6 x 6 mm. There is a mostly cystic nodule along this nodules   upper margin measuring about 3 mm and not significantly changed. Isthmus: 0.6 cm. No nodules. Lymph nodes: None suspicious. Notes: ACR Thyroid Imaging, Reporting and Data System (TI-RADS): White Paper of   the [employer]. Journal of the ACR Vol 14, Issue 5, [id number], May 1,   2017. IMPRESSION:   1. There has been significant interval growth of the solid right thyroid nodule   which is classified TR 3. Tissue diagnosis via fine-needle aspiration is   recommended. 2. Benign left thyroid nodules are stable and no specific imaging follow-up is   required. Assessment/Plan:   1. Multinodular goiter (nontoxic)    2. Acquired hypothyroidism    1) Her right sided thyroid nodule increased from 0.9cm to 2.2 cm from 2019 to 2022. She had a benign FNA by Dr. Renee Rosenberg in June 2022. I will order a thyroid US at this time to look for evidence of any change in her thyroid nodules. If there has not been any change, then we will follow her annually for US monitoring. 2) Pt had a high TSH with normal Free T4  in November 2022, but that was in the face of an acute infection as well as bilateral PE. She is clinically euthyroid today. I will order TFTs and we discussed possibly starting levothyroxine if needed. Pt voices understanding and agreement with the plan. Pain noted and pt was recommended to call her PCP for further evaluation and treatment, as needed    RTC based on her above findings.     Copy sent to:  Dr. Kaplan Repress

## 2023-03-28 NOTE — LETTER
3/28/2023 3:36 PM    Patient:  Teresa Schreiber   YOB: 2000  Date of Visit: 3/28/2023      Dear Kishan Nunez MD  [address] [facility] [address]  Via Fax: [phone]     BRADY Boothe [id number] [facility] [address]  Via In [address] [facility], [address] [place] [address]  [address]  [address] [address]  Via Fax: [phone]     MD Tran Kirby   [address]  [address] [address]  Via Fax: [phone]: Thank you for referring Ms. Teresa Schreiber to me for evaluation/treatment. Below are the relevant portions of my assessment and plan of care. If you have questions, please do not hesitate to call me. I look forward to following Ms. Kayli Santiago along with you. Chief Complaint   Patient presents with    Thyroid Problem     Pcp and pharmacy verified     History of Present Illness: Teresa Schreiber is a 24 y.o. female who I was asked to see in consult by Dr. Susie Salinas for evaluation of multinodular goiter. I previously saw Ms Kayli Santiago for the same in May 2019. Pt had a thyroid US on 2/19/19 which showed multiple sub-CM thyroid nodules and a single 1cm thyroid cyst. Since her thyroid US only showed sub-cm nodules I did not feel she needed an FNA at that time, but I did recommend she follow up with me for a repeat US in a year. She did not keep the follow up appointment and she has not been back to see me since May 2019. In March 2022 she had an US at [facility] and that showed that her dominate right thyroid nodule had grown from 0.9cm to 2.2 x 1.8 x 1.5. She was sent to Dr. Tessy Trejo who did an FNA in June 2022. She reports that the FNA did not show any evidence of cancer. In November 2022 she was having issues of SOB and went to the ED. She had a CT and it showed bilateral PE and PNA. The CT also noted a right thyroid nodule of 2.1cm.     \"I was recently diagnosed with Lupus and my Lupus doctor (Dr. Golda Osgood Abner) told me to follow up with you. \"I had issues of protein in my urine and my kidney doctor (Dr. Tim Luna) sent me to Dr. Tiera Stanton. She is also seeing Dr. Willy De Souza of Hematology since she had bilateral PEs. For the hx of PEs she is on Xarelto. For her Lupus she it taking Prednisone 20mg daily, Hydroxychloroquine 400mg daily and CellCept 1000mg BID. She denies issues of dysphagia or dysphonia. She denies issues of CP, she will get occasional SOB/LUNA. She denies issues of tremors, heat or cold intolerance, diarrhea or constipation. Past Medical History:   Diagnosis Date    Anxiety and depression     History of blood clots     Lupus ([facility] [address]      Past Surgical History:   Procedure Laterality Date    HX WISDOM TEETH EXTRACTION       Current Outpatient Medications   Medication Sig    hydrOXYchloroQUINE (PLAQUENIL) 200 mg tablet Take 400 mg by mouth daily.  bumetanide (BUMEX) 1 mg tablet Take 2 mg by mouth two (2) times a day.  predniSONE (DELTASONE) 20 mg tablet Take 20 mg by mouth daily.  mycophenolate (CELLCEPT) 500 mg tablet Take 1,000 mg by mouth two (2) times a day.  rivaroxaban (XARELTO) 20 mg tab tablet Take 1 Tablet by mouth daily (with dinner) for 180 days. No current facility-administered medications for this visit.      No Known Allergies  Family History   Problem Relation Age of Onset    No Known Problems Mother     No Known Problems Father     Cancer Sister         hodgkins lymphoma    No Known Problems Sister     No Known Problems Brother     Lung Cancer Maternal Grandmother     Diabetes Maternal Grandmother     Hypertension Maternal Grandmother     No Known Problems Maternal Grandfather     Cancer Paternal Grandmother     No Known Problems Paternal Grandfather      Social History     Socioeconomic History    Marital status: SINGLE     Spouse name: Not on file    Number of children: Not on file    Years of education: Not on file    Highest education level: Not on file   Occupational History    Not on file   Tobacco Use    Smoking status: Never    Smokeless tobacco: Never   Vaping Use    Vaping Use: Never used   Substance and Sexual Activity    Alcohol use: Yes     Comment: rarely    Drug use: Never    Sexual activity: Yes     Partners: Male     Birth control/protection: Implant   Other Topics Concern    Not on file   Social History Narrative    Not on file     Social Determinants of Health     Financial Resource Strain: Not on file   Food Insecurity: Not on file   Transportation Needs: Not on file   Physical Activity: Not on file   Stress: Not on file   Social Connections: Not on file   Intimate Partner Violence: Not on file   Housing Stability: Not on file     Review of Systems:  - As noted in HPI    Physical Examination:  - Blood pressure (!) 144/82, pulse (!) 113, height 5' 4\" (1.626 m), weight 283 lb 6.4 oz (128.5 kg). - General: pleasant, no distress, good eye contact  - HEENT: no exopthalmos, no periorbital edema, no scleral/conjunctival injection, EOMI, no lid lag or stare  - Neck: supple, no thyromegaly, masses, lymph nodes, or carotid bruits, no supraclavicular or dorsocervical fat pads  - Cardiovascular: regular, normal rate, normal S1 and S2, no murmurs/rubs/gallops   - Respiratory: clear to auscultation bilaterally  - Gastrointestinal: soft, nontender, nondistended, no masses, no hepatosplenomegaly  - Musculoskeletal: no proximal muscle weakness in upper or lower extremities  - Integumentary: + acanthosis nigricans,  no rashes, no edema  - Neurological: reflexes 2+ at biceps, no tremor  - Psychiatric: normal mood and affect    Data Reviewed:   INDICATION: Tachycardia, tachypnea, left flank pain     COMPARISON: None     TECHNIQUE:  CTA imaging of the chest,  and CT imaging of the abdomen and pelvis  was performed after the uneventful intravenous administration of contrast  material.  Coronal and sagittal reconstructions were generated.   3D image  postprocessing was performed. CT dose reduction was achieved through use of a  standardized protocol tailored for this examination and automatic exposure  control for dose modulation. FINDINGS:     THYROID: 2.1 cm right thyroid nodule. Small left thyroid nodules. MEDIASTINUM/SOLANGE: Mildly enlarged bilateral axillary lymph nodes, 1.2-1.3 cm. Right paratracheal lymph nodes at the 1.1 cm. HEART/PERICARDIUM: Unremarkable. AORTA: No aneurysm. PULMONARY ARTERIES: Suboptimal contrast opacification of the pulmonary arterial  circuit, though there are bilateral lower lobe pulmonary emboli. LUNGS/PLEURA: Patchy airspace disease in the bilateral lower lobes and lingula. Small to moderate left pleural effusion. No pneumothorax. BONES: No destructive bone lesion. ADDITIONAL COMMENTS: N/A     LIVER: No mass or biliary dilatation. GALLBLADDER: Pericholecystic fluid. Possible stones/sludge within the  gallbladder. SPLEEN: Enlarged, 16.5 cm in craniocaudal dimension. No definite splenic vein  thrombus. PANCREAS: No mass or ductal dilatation. ADRENALS: No mass. KIDNEYS: Mild asymmetric enlargement of the left kidney relative to the right,  with mild left perinephric stranding. No hydronephrosis or nephrolithiasis. Subtle filling defects in the left renal vein  GI TRACT: No bowel obstruction or wall thickening  PERITONEUM: No free air or free fluid. APPENDIX: Unremarkable. Graceann Garcia RETROPERITONEUM: No lymphadenopathy or aortic aneurysm. URINARY BLADDER: No mass or calculus. LYMPH NODES: Prominent bilateral external iliac chain and inguinal lymph nodes. REPRODUCTIVE ORGANS: Unremarkable. FREE FLUID: None. BONES: No destructive bone lesion. ADDITIONAL COMMENTS: N/A. IMPRESSION     1. Acute bilateral lower lobe pulmonary emboli. 2. Patchy bilateral airspace disease and small to moderate left pleural  effusion. Mildly enlarged thoracic lymph nodes.   3. Left renal vein thrombus, with slight renal enlargement and perinephric  stranding as above. 4. Splenomegaly. No definite splenic vein thrombosis. 5. Pericholecystic fluid. Possible cholelithiasis/sludge. 6. 2.1 cm right thyroid nodule. Recommend outpatient ultrasound, possibly  warranting fine-needle aspiration. The findings were called to Dr. Bella Brian on 11/12/22 at [address] [place] [address] by myself. Component      Latest Ref Rng & Units 11/14/2022 11/13/2022 2/28/2022   TSH      0.36 - 3.74 uIU/mL  6.80 (H) 2.86   Free Triiodothyronine (T3)      2.2 - 4.0 pg/mL 1.3 (L)     T4, Free      0.8 - 1.5 NG/DL 1.1       FINDINGS:   Right thyroid lobe: 6.2 x 2.1 x 2.2 cm. Normal background echogenicity and   perfusion. Solid, hypoechoic mid nodule with smooth margins, wider than tall   and lacking hyperechoic foci measures 2.2 x 1.8 x 1.5 cm previously about 0.9 x   0.9 x 0.7 cm. Left thyroid lobe: 6 x 1.2 x 2.0 cm. Normal background echogenicity and   perfusion. Chronic isodense solid medial lower pole nodule today 8 8 x 6 mm,   previously about 6 x 6 mm. There is a mostly cystic nodule along this nodules   upper margin measuring about 3 mm and not significantly changed. Isthmus: 0.6 cm. No nodules. Lymph nodes: None suspicious. Notes: ACR Thyroid Imaging, Reporting and Data System (TI-RADS): White Paper of   the [employer]. Journal of the ACR Vol 14, Issue 5, [id number], May 1,   2017. IMPRESSION:   1. There has been significant interval growth of the solid right thyroid nodule   which is classified TR 3. Tissue diagnosis via fine-needle aspiration is   recommended. 2. Benign left thyroid nodules are stable and no specific imaging follow-up is   required. Assessment/Plan:   1. Multinodular goiter (nontoxic)    2. Acquired hypothyroidism    1) Her right sided thyroid nodule increased from 0.9cm to 2.2 cm from 2019 to 2022. She had a benign FNA by Dr. Sonia Chance in June 2022.  I will order a thyroid US at this time to look for evidence of any change in her thyroid nodules. If there has not been any change, then we will follow her annually for US monitoring. 2) Pt had a high TSH with normal Free T4  in November 2022, but that was in the face of an acute infection as well as bilateral PE. She is clinically euthyroid today. I will order TFTs and we discussed possibly starting levothyroxine if needed. Pt voices understanding and agreement with the plan. Pain noted and pt was recommended to call her PCP for further evaluation and treatment, as needed    RTC based on her above findings.     Copy sent to:  Dr. Marlee Ames      Sincerely,      Asmita Oneil MD

## 2023-03-31 ENCOUNTER — TELEPHONE (OUTPATIENT)
Dept: ENDOCRINOLOGY | Age: 23
End: 2023-03-31

## 2023-03-31 NOTE — TELEPHONE ENCOUNTER
Patient left message on VM stating that she is returning 's call. She can be reached at [phone]. 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

## 2023-04-20 ENCOUNTER — HOSPITAL ENCOUNTER (OUTPATIENT)
Dept: ULTRASOUND IMAGING | Age: 23
Discharge: HOME | End: 2023-04-20
Attending: INTERNAL MEDICINE
Payer: COMMERCIAL

## 2023-04-20 DIAGNOSIS — E04.2 MULTINODULAR GOITER (NONTOXIC): ICD-10-CM

## 2023-04-20 PROCEDURE — 76536 US EXAM OF HEAD AND NECK: CPT

## 2023-04-24 DIAGNOSIS — E04.2 MULTINODULAR GOITER (NONTOXIC): Primary | ICD-10-CM

## 2023-04-24 NOTE — PROGRESS NOTES
I spoke with pt regarding her thyroid US. Her Right dominate nodule has increased in size and I recommend to her that we repeat the FNA. Pt voices understanding and agreement with the plan.

## 2023-05-17 RX ORDER — HYDROXYCHLOROQUINE SULFATE 200 MG/1
400 TABLET, FILM COATED ORAL DAILY
COMMUNITY
Start: 2023-03-09

## 2023-05-17 RX ORDER — MYCOPHENOLATE MOFETIL 500 MG/1
1500 TABLET ORAL 2 TIMES DAILY
COMMUNITY
Start: 2023-02-28

## 2023-05-17 RX ORDER — BUMETANIDE 1 MG/1
2 TABLET ORAL 2 TIMES DAILY
COMMUNITY
Start: 2023-03-21

## 2023-05-17 RX ORDER — PREDNISONE 20 MG/1
10 TABLET ORAL DAILY
COMMUNITY
Start: 2023-02-27

## 2023-05-26 ENCOUNTER — HOSPITAL ENCOUNTER (OUTPATIENT)
Facility: HOSPITAL | Age: 23
End: 2023-05-26
Payer: COMMERCIAL

## 2023-05-26 DIAGNOSIS — E04.2 MULTINODULAR GOITER (NONTOXIC): ICD-10-CM

## 2023-05-26 PROCEDURE — 88173 CYTOPATH EVAL FNA REPORT: CPT

## 2023-05-26 PROCEDURE — 88172 CYTP DX EVAL FNA 1ST EA SITE: CPT

## 2023-05-26 PROCEDURE — 10005 FNA BX W/US GDN 1ST LES: CPT

## 2023-07-23 ENCOUNTER — HOSPITAL ENCOUNTER (INPATIENT)
Facility: HOSPITAL | Age: 23
LOS: 3 days | Discharge: HOME | DRG: 193 | End: 2023-07-26
Attending: STUDENT IN AN ORGANIZED HEALTH CARE EDUCATION/TRAINING PROGRAM | Admitting: STUDENT IN AN ORGANIZED HEALTH CARE EDUCATION/TRAINING PROGRAM
Payer: COMMERCIAL

## 2023-07-23 ENCOUNTER — APPOINTMENT (OUTPATIENT)
Facility: HOSPITAL | Age: 23
DRG: 193 | End: 2023-07-23
Payer: COMMERCIAL

## 2023-07-23 DIAGNOSIS — J18.9 PNEUMONIA OF RIGHT UPPER LOBE DUE TO INFECTIOUS ORGANISM: Primary | ICD-10-CM

## 2023-07-23 LAB
ALBUMIN SERPL-MCNC: 2.2 G/DL (ref 3.5–5)
ALBUMIN/GLOB SERPL: 0.5 (ref 1.1–2.2)
ALP SERPL-CCNC: 70 U/L (ref 45–117)
ALT SERPL-CCNC: 21 U/L (ref 12–78)
ANION GAP SERPL CALC-SCNC: 7 MMOL/L (ref 5–15)
AST SERPL-CCNC: 20 U/L (ref 15–37)
BASOPHILS # BLD: 0 K/UL (ref 0–0.1)
BASOPHILS NFR BLD: 0 % (ref 0–1)
BILIRUB SERPL-MCNC: 0.2 MG/DL (ref 0.2–1)
BUN SERPL-MCNC: 15 MG/DL (ref 6–20)
BUN/CREAT SERPL: 20 (ref 12–20)
CALCIUM SERPL-MCNC: 8.3 MG/DL (ref 8.5–10.1)
CHLORIDE SERPL-SCNC: 109 MMOL/L (ref 97–108)
CO2 SERPL-SCNC: 24 MMOL/L (ref 21–32)
CREAT SERPL-MCNC: 0.76 MG/DL (ref 0.55–1.02)
DIFFERENTIAL METHOD BLD: ABNORMAL
EOSINOPHIL # BLD: 0 K/UL (ref 0–0.4)
EOSINOPHIL NFR BLD: 0 % (ref 0–7)
ERYTHROCYTE [DISTWIDTH] IN BLOOD BY AUTOMATED COUNT: 12.7 % (ref 11.5–14.5)
GLOBULIN SER CALC-MCNC: 4.4 G/DL (ref 2–4)
GLUCOSE SERPL-MCNC: 94 MG/DL (ref 65–100)
HCT VFR BLD AUTO: 29.7 % (ref 35–47)
HGB BLD-MCNC: 9.9 G/DL (ref 11.5–16)
IMM GRANULOCYTES # BLD AUTO: 0.1 K/UL (ref 0–0.04)
IMM GRANULOCYTES NFR BLD AUTO: 1 % (ref 0–0.5)
LYMPHOCYTES # BLD: 0.8 K/UL (ref 0.8–3.5)
LYMPHOCYTES NFR BLD: 7 % (ref 12–49)
MCH RBC QN AUTO: 29.1 PG (ref 26–34)
MCHC RBC AUTO-ENTMCNC: 33.3 G/DL (ref 30–36.5)
MCV RBC AUTO: 87.4 FL (ref 80–99)
MONOCYTES # BLD: 0.7 K/UL (ref 0–1)
MONOCYTES NFR BLD: 6 % (ref 5–13)
NEUTS SEG # BLD: 9.6 K/UL (ref 1.8–8)
NEUTS SEG NFR BLD: 86 % (ref 32–75)
NRBC # BLD: 0 K/UL (ref 0–0.01)
NRBC BLD-RTO: 0 PER 100 WBC
PLATELET # BLD AUTO: 293 K/UL (ref 150–400)
PMV BLD AUTO: 10.6 FL (ref 8.9–12.9)
POTASSIUM SERPL-SCNC: 3.9 MMOL/L (ref 3.5–5.1)
PROT SERPL-MCNC: 6.6 G/DL (ref 6.4–8.2)
RBC # BLD AUTO: 3.4 M/UL (ref 3.8–5.2)
RBC MORPH BLD: ABNORMAL
SODIUM SERPL-SCNC: 140 MMOL/L (ref 136–145)
TROPONIN I SERPL HS-MCNC: 28 NG/L (ref 0–51)
TROPONIN I SERPL HS-MCNC: 30 NG/L (ref 0–51)
WBC # BLD AUTO: 11.2 K/UL (ref 3.6–11)

## 2023-07-23 PROCEDURE — 80053 COMPREHEN METABOLIC PANEL: CPT

## 2023-07-23 PROCEDURE — 84484 ASSAY OF TROPONIN QUANT: CPT

## 2023-07-23 PROCEDURE — 93005 ELECTROCARDIOGRAM TRACING: CPT | Performed by: STUDENT IN AN ORGANIZED HEALTH CARE EDUCATION/TRAINING PROGRAM

## 2023-07-23 PROCEDURE — 99285 EMERGENCY DEPT VISIT HI MDM: CPT

## 2023-07-23 PROCEDURE — 71275 CT ANGIOGRAPHY CHEST: CPT

## 2023-07-23 PROCEDURE — 1100000000 HC RM PRIVATE

## 2023-07-23 PROCEDURE — 94762 N-INVAS EAR/PLS OXIMTRY CONT: CPT

## 2023-07-23 PROCEDURE — 36415 COLL VENOUS BLD VENIPUNCTURE: CPT

## 2023-07-23 PROCEDURE — 2580000003 HC RX 258: Performed by: STUDENT IN AN ORGANIZED HEALTH CARE EDUCATION/TRAINING PROGRAM

## 2023-07-23 PROCEDURE — 6360000002 HC RX W HCPCS: Performed by: STUDENT IN AN ORGANIZED HEALTH CARE EDUCATION/TRAINING PROGRAM

## 2023-07-23 PROCEDURE — 85025 COMPLETE CBC W/AUTO DIFF WBC: CPT

## 2023-07-23 PROCEDURE — 96374 THER/PROPH/DIAG INJ IV PUSH: CPT

## 2023-07-23 PROCEDURE — 6360000004 HC RX CONTRAST MEDICATION: Performed by: STUDENT IN AN ORGANIZED HEALTH CARE EDUCATION/TRAINING PROGRAM

## 2023-07-23 RX ORDER — PREDNISONE 5 MG/1
10 TABLET ORAL DAILY
Status: DISCONTINUED | OUTPATIENT
Start: 2023-07-24 | End: 2023-07-26 | Stop reason: HOSPADM

## 2023-07-23 RX ORDER — POLYETHYLENE GLYCOL 3350 17 G/17G
17 POWDER, FOR SOLUTION ORAL DAILY PRN
Status: DISCONTINUED | OUTPATIENT
Start: 2023-07-23 | End: 2023-07-26 | Stop reason: HOSPADM

## 2023-07-23 RX ORDER — ONDANSETRON 4 MG/1
4 TABLET, ORALLY DISINTEGRATING ORAL EVERY 8 HOURS PRN
Status: DISCONTINUED | OUTPATIENT
Start: 2023-07-23 | End: 2023-07-26 | Stop reason: HOSPADM

## 2023-07-23 RX ORDER — ACETAMINOPHEN 325 MG/1
650 TABLET ORAL EVERY 6 HOURS PRN
Status: DISCONTINUED | OUTPATIENT
Start: 2023-07-23 | End: 2023-07-26 | Stop reason: HOSPADM

## 2023-07-23 RX ORDER — ENOXAPARIN SODIUM 100 MG/ML
30 INJECTION SUBCUTANEOUS 2 TIMES DAILY
Status: DISCONTINUED | OUTPATIENT
Start: 2023-07-24 | End: 2023-07-23

## 2023-07-23 RX ORDER — BUMETANIDE 1 MG/1
2 TABLET ORAL 2 TIMES DAILY
Status: DISCONTINUED | OUTPATIENT
Start: 2023-07-24 | End: 2023-07-26 | Stop reason: HOSPADM

## 2023-07-23 RX ORDER — GUAIFENESIN 600 MG/1
600 TABLET, EXTENDED RELEASE ORAL 2 TIMES DAILY
Status: DISCONTINUED | OUTPATIENT
Start: 2023-07-24 | End: 2023-07-26 | Stop reason: HOSPADM

## 2023-07-23 RX ORDER — SODIUM CHLORIDE 0.9 % (FLUSH) 0.9 %
5-40 SYRINGE (ML) INJECTION PRN
Status: DISCONTINUED | OUTPATIENT
Start: 2023-07-23 | End: 2023-07-26 | Stop reason: HOSPADM

## 2023-07-23 RX ORDER — IPRATROPIUM BROMIDE AND ALBUTEROL SULFATE 2.5; .5 MG/3ML; MG/3ML
1 SOLUTION RESPIRATORY (INHALATION) EVERY 4 HOURS PRN
Status: DISCONTINUED | OUTPATIENT
Start: 2023-07-23 | End: 2023-07-26 | Stop reason: HOSPADM

## 2023-07-23 RX ORDER — ONDANSETRON 2 MG/ML
4 INJECTION INTRAMUSCULAR; INTRAVENOUS EVERY 6 HOURS PRN
Status: DISCONTINUED | OUTPATIENT
Start: 2023-07-23 | End: 2023-07-26 | Stop reason: HOSPADM

## 2023-07-23 RX ORDER — MYCOPHENOLATE MOFETIL 250 MG/1
1500 CAPSULE ORAL 2 TIMES DAILY
Status: DISCONTINUED | OUTPATIENT
Start: 2023-07-24 | End: 2023-07-26 | Stop reason: HOSPADM

## 2023-07-23 RX ORDER — SODIUM CHLORIDE 9 MG/ML
INJECTION, SOLUTION INTRAVENOUS PRN
Status: DISCONTINUED | OUTPATIENT
Start: 2023-07-23 | End: 2023-07-26 | Stop reason: HOSPADM

## 2023-07-23 RX ORDER — ACETAMINOPHEN 650 MG/1
650 SUPPOSITORY RECTAL EVERY 6 HOURS PRN
Status: DISCONTINUED | OUTPATIENT
Start: 2023-07-23 | End: 2023-07-26 | Stop reason: HOSPADM

## 2023-07-23 RX ORDER — SODIUM CHLORIDE 0.9 % (FLUSH) 0.9 %
5-40 SYRINGE (ML) INJECTION EVERY 12 HOURS SCHEDULED
Status: DISCONTINUED | OUTPATIENT
Start: 2023-07-24 | End: 2023-07-26 | Stop reason: HOSPADM

## 2023-07-23 RX ORDER — HYDROXYCHLOROQUINE SULFATE 200 MG/1
400 TABLET, FILM COATED ORAL DAILY
Status: DISCONTINUED | OUTPATIENT
Start: 2023-07-24 | End: 2023-07-26 | Stop reason: HOSPADM

## 2023-07-23 RX ORDER — DOXYCYCLINE HYCLATE 100 MG
100 TABLET ORAL EVERY 12 HOURS SCHEDULED
Status: DISCONTINUED | OUTPATIENT
Start: 2023-07-24 | End: 2023-07-26 | Stop reason: HOSPADM

## 2023-07-23 RX ADMIN — IOPAMIDOL 100 ML: 755 INJECTION, SOLUTION INTRAVENOUS at 21:45

## 2023-07-23 RX ADMIN — AMPICILLIN SODIUM AND SULBACTAM SODIUM 3000 MG: 2; 1 INJECTION, POWDER, FOR SOLUTION INTRAMUSCULAR; INTRAVENOUS at 23:48

## 2023-07-23 ASSESSMENT — LIFESTYLE VARIABLES
HOW OFTEN DO YOU HAVE A DRINK CONTAINING ALCOHOL: MONTHLY OR LESS
HOW MANY STANDARD DRINKS CONTAINING ALCOHOL DO YOU HAVE ON A TYPICAL DAY: 1 OR 2

## 2023-07-24 LAB
ANION GAP SERPL CALC-SCNC: 9 MMOL/L (ref 5–15)
BASOPHILS # BLD: 0 K/UL (ref 0–0.1)
BASOPHILS NFR BLD: 0 % (ref 0–1)
BUN SERPL-MCNC: 14 MG/DL (ref 6–20)
BUN/CREAT SERPL: 20 (ref 12–20)
CALCIUM SERPL-MCNC: 7.9 MG/DL (ref 8.5–10.1)
CHLORIDE SERPL-SCNC: 110 MMOL/L (ref 97–108)
CO2 SERPL-SCNC: 23 MMOL/L (ref 21–32)
CREAT SERPL-MCNC: 0.71 MG/DL (ref 0.55–1.02)
DIFFERENTIAL METHOD BLD: ABNORMAL
EKG ATRIAL RATE: 109 BPM
EKG DIAGNOSIS: NORMAL
EKG P AXIS: 60 DEGREES
EKG P-R INTERVAL: 150 MS
EKG Q-T INTERVAL: 336 MS
EKG QRS DURATION: 70 MS
EKG QTC CALCULATION (BAZETT): 452 MS
EKG R AXIS: 10 DEGREES
EKG T AXIS: 14 DEGREES
EKG VENTRICULAR RATE: 109 BPM
EOSINOPHIL # BLD: 0 K/UL (ref 0–0.4)
EOSINOPHIL NFR BLD: 0 % (ref 0–7)
ERYTHROCYTE [DISTWIDTH] IN BLOOD BY AUTOMATED COUNT: 12.7 % (ref 11.5–14.5)
GLUCOSE SERPL-MCNC: 95 MG/DL (ref 65–100)
HCT VFR BLD AUTO: 28.8 % (ref 35–47)
HGB BLD-MCNC: 9.8 G/DL (ref 11.5–16)
IMM GRANULOCYTES # BLD AUTO: 0 K/UL (ref 0–0.04)
IMM GRANULOCYTES NFR BLD AUTO: 0 % (ref 0–0.5)
LACTATE SERPL-SCNC: 1.6 MMOL/L (ref 0.4–2)
LYMPHOCYTES # BLD: 0.8 K/UL (ref 0.8–3.5)
LYMPHOCYTES NFR BLD: 8 % (ref 12–49)
MCH RBC QN AUTO: 29.3 PG (ref 26–34)
MCHC RBC AUTO-ENTMCNC: 34 G/DL (ref 30–36.5)
MCV RBC AUTO: 86.2 FL (ref 80–99)
MONOCYTES # BLD: 0.5 K/UL (ref 0–1)
MONOCYTES NFR BLD: 5 % (ref 5–13)
NEUTS SEG # BLD: 8.4 K/UL (ref 1.8–8)
NEUTS SEG NFR BLD: 87 % (ref 32–75)
NRBC # BLD: 0 K/UL (ref 0–0.01)
NRBC BLD-RTO: 0 PER 100 WBC
PLATELET # BLD AUTO: 235 K/UL (ref 150–400)
PMV BLD AUTO: 10.1 FL (ref 8.9–12.9)
POTASSIUM SERPL-SCNC: 3.6 MMOL/L (ref 3.5–5.1)
PROCALCITONIN SERPL-MCNC: <0.05 NG/ML
RBC # BLD AUTO: 3.34 M/UL (ref 3.8–5.2)
SARS-COV-2 RDRP RESP QL NAA+PROBE: NOT DETECTED
SODIUM SERPL-SCNC: 142 MMOL/L (ref 136–145)
SOURCE: NORMAL
WBC # BLD AUTO: 9.7 K/UL (ref 3.6–11)

## 2023-07-24 PROCEDURE — 6370000000 HC RX 637 (ALT 250 FOR IP): Performed by: STUDENT IN AN ORGANIZED HEALTH CARE EDUCATION/TRAINING PROGRAM

## 2023-07-24 PROCEDURE — 87449 NOS EACH ORGANISM AG IA: CPT

## 2023-07-24 PROCEDURE — 83605 ASSAY OF LACTIC ACID: CPT

## 2023-07-24 PROCEDURE — 1100000003 HC PRIVATE W/ TELEMETRY

## 2023-07-24 PROCEDURE — 80048 BASIC METABOLIC PNL TOTAL CA: CPT

## 2023-07-24 PROCEDURE — 85025 COMPLETE CBC W/AUTO DIFF WBC: CPT

## 2023-07-24 PROCEDURE — 87635 SARS-COV-2 COVID-19 AMP PRB: CPT

## 2023-07-24 PROCEDURE — 6360000002 HC RX W HCPCS: Performed by: STUDENT IN AN ORGANIZED HEALTH CARE EDUCATION/TRAINING PROGRAM

## 2023-07-24 PROCEDURE — 2580000003 HC RX 258: Performed by: STUDENT IN AN ORGANIZED HEALTH CARE EDUCATION/TRAINING PROGRAM

## 2023-07-24 PROCEDURE — 2700000000 HC OXYGEN THERAPY PER DAY

## 2023-07-24 PROCEDURE — 87205 SMEAR GRAM STAIN: CPT

## 2023-07-24 PROCEDURE — 87899 AGENT NOS ASSAY W/OPTIC: CPT

## 2023-07-24 PROCEDURE — 86738 MYCOPLASMA ANTIBODY: CPT

## 2023-07-24 PROCEDURE — 87070 CULTURE OTHR SPECIMN AEROBIC: CPT

## 2023-07-24 PROCEDURE — 84145 PROCALCITONIN (PCT): CPT

## 2023-07-24 PROCEDURE — 36415 COLL VENOUS BLD VENIPUNCTURE: CPT

## 2023-07-24 RX ORDER — FERROUS SULFATE 325(65) MG
325 TABLET ORAL
Status: DISCONTINUED | OUTPATIENT
Start: 2023-07-24 | End: 2023-07-26 | Stop reason: HOSPADM

## 2023-07-24 RX ADMIN — GUAIFENESIN 600 MG: 600 TABLET, EXTENDED RELEASE ORAL at 02:35

## 2023-07-24 RX ADMIN — AMPICILLIN SODIUM AND SULBACTAM SODIUM 3000 MG: 2; 1 INJECTION, POWDER, FOR SOLUTION INTRAMUSCULAR; INTRAVENOUS at 12:22

## 2023-07-24 RX ADMIN — FERROUS SULFATE TAB 325 MG (65 MG ELEMENTAL FE) 325 MG: 325 (65 FE) TAB at 08:20

## 2023-07-24 RX ADMIN — HYDROXYCHLOROQUINE SULFATE 400 MG: 200 TABLET ORAL at 08:21

## 2023-07-24 RX ADMIN — BUMETANIDE 2 MG: 1 TABLET ORAL at 08:20

## 2023-07-24 RX ADMIN — AMPICILLIN SODIUM AND SULBACTAM SODIUM 3000 MG: 2; 1 INJECTION, POWDER, FOR SOLUTION INTRAMUSCULAR; INTRAVENOUS at 06:40

## 2023-07-24 RX ADMIN — SODIUM CHLORIDE, PRESERVATIVE FREE 10 ML: 5 INJECTION INTRAVENOUS at 08:24

## 2023-07-24 RX ADMIN — PREDNISONE 10 MG: 5 TABLET ORAL at 08:20

## 2023-07-24 RX ADMIN — DOXYCYCLINE HYCLATE 100 MG: 100 TABLET, COATED ORAL at 22:56

## 2023-07-24 RX ADMIN — ACETAMINOPHEN 650 MG: 325 TABLET ORAL at 17:45

## 2023-07-24 RX ADMIN — AMPICILLIN SODIUM AND SULBACTAM SODIUM 3000 MG: 2; 1 INJECTION, POWDER, FOR SOLUTION INTRAMUSCULAR; INTRAVENOUS at 17:44

## 2023-07-24 RX ADMIN — GUAIFENESIN 600 MG: 600 TABLET, EXTENDED RELEASE ORAL at 22:56

## 2023-07-24 RX ADMIN — GUAIFENESIN 600 MG: 600 TABLET, EXTENDED RELEASE ORAL at 08:20

## 2023-07-24 RX ADMIN — BUMETANIDE 2 MG: 1 TABLET ORAL at 22:55

## 2023-07-24 RX ADMIN — MYCOPHENOLATE MOFETIL 1500 MG: 250 CAPSULE ORAL at 02:35

## 2023-07-24 RX ADMIN — AMPICILLIN SODIUM AND SULBACTAM SODIUM 3000 MG: 2; 1 INJECTION, POWDER, FOR SOLUTION INTRAMUSCULAR; INTRAVENOUS at 23:07

## 2023-07-24 RX ADMIN — MYCOPHENOLATE MOFETIL 1500 MG: 250 CAPSULE ORAL at 08:20

## 2023-07-24 RX ADMIN — DOXYCYCLINE HYCLATE 100 MG: 100 TABLET, COATED ORAL at 08:20

## 2023-07-24 RX ADMIN — SODIUM CHLORIDE, PRESERVATIVE FREE 10 ML: 5 INJECTION INTRAVENOUS at 22:59

## 2023-07-24 RX ADMIN — RIVAROXABAN 20 MG: 20 TABLET, FILM COATED ORAL at 17:44

## 2023-07-24 RX ADMIN — BUMETANIDE 2 MG: 1 TABLET ORAL at 02:35

## 2023-07-24 ASSESSMENT — PAIN DESCRIPTION - DESCRIPTORS: DESCRIPTORS: ACHING

## 2023-07-24 ASSESSMENT — PAIN SCALES - GENERAL: PAINLEVEL_OUTOF10: 5

## 2023-07-24 ASSESSMENT — PAIN DESCRIPTION - LOCATION: LOCATION: HEAD

## 2023-07-24 ASSESSMENT — PAIN DESCRIPTION - ORIENTATION: ORIENTATION: MID

## 2023-07-24 NOTE — ED NOTES
Patient ambulated with pulse ox at this time. Patient's saturations decreased to 82% while ambulating without oxygen, patient became tachypneic and tachycardic during ambulation. [facility] 2L O2 nasal cannula applied following ambulation. Berhane Chatman MD notified.      Erica Puente RN  07/24/23 [id number]

## 2023-07-24 NOTE — CONSULTS
Brief note after chart reivew    Please hold cellcept and lupkynis while on antibiotics  Please continue pred 5mg daily and continue plaquenil

## 2023-07-24 NOTE — ED PROVIDER NOTES
Range    Troponin, High Sensitivity 28 0 - 51 ng/L       EKG interpreted by me: Sinus tachycardia, rate of 109 no ST changes     RADIOLOGY:  Non-plain film images such as CT, Ultrasound and MRI are read by the radiologist.   Plain radiographic images are often visualized and preliminarily interpreted by the ED, if an interpretation was completed the findings will be listed below:       Interpretation per the Radiologist below, if available at the time of this note:    CTA CHEST W WO CONTRAST PE Eval   Final Result   There is no proximal pulmonary embolism. There is no aortic aneurysm or dissection. Severe right lower lobe and right upper lobe pneumonia with small bilateral   pleural effusions. There is associated hilar or mediastinal lymphadenopathy   which is likely reactive in nature. Follow-up CT scan of chest in 4-6 weeks to   evaluate for resolution of lymphadenopathy is recommended. Incidental findings are as described above. PROCEDURES   Unless otherwise noted below, none  Procedures     CRITICAL CARE TIME       FINAL IMPRESSION     1. Pneumonia of right upper lobe due to infectious organism          DISPOSITION/PLAN   DISPOSITION Decision To Admit 07/23/2023 11:32:47 PM         PATIENT REFERRED TO:   No follow-up provider specified. DISCHARGE MEDICATIONS:     Medication List        ASK your doctor about these medications      bumetanide 1 MG tablet  Commonly known as: BUMEX     hydroxychloroquine 200 MG tablet  Commonly known as: PLAQUENIL     mycophenolate 500 MG tablet  Commonly known as: CELLCEPT     predniSONE 20 MG tablet  Commonly known as: DELTASONE     rivaroxaban 20 MG Tabs tablet  Commonly known as: Maritza Garvey                DISCONTINUED MEDICATIONS:  Current Discharge Medication List          I am the Primary Clinician of Record.    Signed By: Patrica Corbett MD     July 23, 2023      (Please note that parts of this dictation were completed with voice recognition

## 2023-07-24 NOTE — ED NOTES
Pt presents to room 1 with CC of SOB that began this morning upon waking up. Pt reports symptoms are the same and have not improved since this morning. Pt has hx of lupus but reports this is the first incident of SOB. Pt reports walking and standing worsen symptoms while lying improves them.  Pt has no resp hx      Mainor Schuster  07/23/23 2100

## 2023-07-24 NOTE — H&P
Hospitalist Admission Note    NAME:  Brad Galeana   :  2000   MRN:  [id number]     Date/Time:  2023 11:59 PM    Patient PCP: DOMINICK Gutierrez - NP    ______________________________________________________________________  Given the patient's current clinical presentation, I have a high level of concern for decompensation if discharged from the emergency department. Complex decision making was performed, which includes reviewing the patient's available past medical records, laboratory results, and x-ray films. My assessment of this patient's clinical condition and my plan of care is as follows. 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  Normocytic anemia-history iron deficiency anemia  Obesity class III by BMI 53.47    Plan:   Moderate-severe pneumonia of right upper and lower lobes with small bilateral pleural effusions  Admit to telemetry monitoring  Pulmonology consulted, greatly appreciate their expertise  Continue empiric Unasyn  Add doxycycline  Check MRSA nares  Strep urinary antigen  Culture sputum  Aggressive pulmonary toilet  DuoNebs as needed  Continuous pulse oximetry and supplemental oxygen as needed to maintain saturations greater than 90%  Note: 4-6-week CT follow-up recommended to ensure resolution of Melissa lymphadenopathy    Lupus nephritis  Immunosuppressive status-hydroxychloroquine, prednisone, CellCept  Continue PTA hydroxychloroquine, prednisone, CellCept-doses verified by patient  Low threshold to consult nephrology with any evidence of DORINDA given susceptible kidneys  Continue PTA Bumex    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

## 2023-07-24 NOTE — CARE COORDINATION
Care Management Initial Assessment       RUR: 6%  Readmission? No  1st IM letter given? No-Private insurance   1st  letter given: No     07/24/23 3500   Service Assessment   Patient Orientation Alert and Oriented   Cognition Alert   History Provided By Patient   Primary Caregiver Self   Support Systems Parent; Family Members;Friends/Neighbors   Patient's Healthcare Decision Maker is: Patient Declined (Legal Next of Kin Remains as Decision Maker)   PCP Verified by CM Yes   Last Visit to PCP Within last 3 months   Prior Functional Level Independent in ADLs/IADLs   Current Functional Level Independent in ADLs/IADLs   Can patient return to prior living arrangement Yes   Family able to assist with home care needs: No   Would you like for me to discuss the discharge plan with any other family members/significant others, and if so, who? Yes  (Pt's mother, Praful Burnette)   Financial Resources Other (Comment); Medicaid  (BCBS)   Social/Functional History   Lives With Friend(s)   Type of [address] None   Active  Yes   Discharge Planning   Type of Residence House   Current Services Prior To Admission None   Patient expects to be discharged to: Apartment     CM introduce self, explain role and confirm demographics with pt. Pt lives with roommates in a two floor apartment with 15 steps to enter with railing. No DME reported. No hx of home health, SNF or inpatient rehab. Pt uses [facility] in [address]. At the time of d/c pt's family/friends will transport. CM will follow and assist with d/c planning.     [id number] [employer] Worker/

## 2023-07-24 NOTE — CONSULTS
Rheumatology Consult    Subjective: Chrissie Talley is a 24 y. o.female with hx of Lupus on immunosuppression admitted for CAP. Lupus manifestions incl Lupus nephritis and hx of PTEs on xarelto. She is currently on MMF, hcq, pred 5mg daily and lupkynis for lupus nephritis and is admitted for CAP. She p/w dyspnea which has now improved after abx.  No new rashes, GI sx, pleuritic CP, hemoptysis     Past Medical History:   Diagnosis Date    Anxiety and depression     History of blood clots     Lupus (HCC)       Past Surgical History:   Procedure Laterality Date    CT BIOPSY RENAL  11/21/2022    CT BIOPSY RENAL 11/21/2022 [facility] RAD CT    WISDOM TOOTH EXTRACTION       Family History   Problem Relation Age of Onset    No Known Problems Sister     No Known Problems Brother     Lung Cancer Maternal Grandmother     Diabetes Maternal Grandmother     Hypertension Maternal Grandmother     No Known Problems Maternal Grandfather     Cancer Paternal Grandmother     No Known Problems Paternal Grandfather     No Known Problems Mother     No Known Problems Father     Cancer Sister         hodgkins lymphoma      Social History     Tobacco Use    Smoking status: Never    Smokeless tobacco: Never   Substance Use Topics    Alcohol use: Yes       Current Facility-Administered Medications   Medication Dose Route Frequency    ferrous sulfate (IRON 325) tablet 325 mg  325 mg Oral Daily with breakfast    ampicillin-sulbactam (UNASYN) 3,000 mg in sodium chloride 0.9 % 100 mL IVPB (mini-bag)  3,000 mg IntraVENous Q6H    ipratropium 0.5 mg-albuterol 2.5 mg (DUONEB) nebulizer solution 1 Dose  1 Dose Inhalation Q4H PRN    guaiFENesin (MUCINEX) extended release tablet 600 mg  600 mg Oral BID    bumetanide (BUMEX) tablet 2 mg  2 mg Oral BID    hydroxychloroquine (PLAQUENIL) tablet 400 mg  400 mg Oral Daily    mycophenolate (CELLCEPT) capsule 1,500 mg  1,500 mg Oral BID    predniSONE (DELTASONE) tablet 10 mg  10 mg Oral Daily    rivaroxaban

## 2023-07-25 LAB
ALBUMIN SERPL-MCNC: 2.1 G/DL (ref 3.5–5)
ALBUMIN/GLOB SERPL: 0.5 (ref 1.1–2.2)
ALP SERPL-CCNC: 63 U/L (ref 45–117)
ALT SERPL-CCNC: 21 U/L (ref 12–78)
ANION GAP SERPL CALC-SCNC: 8 MMOL/L (ref 5–15)
AST SERPL-CCNC: 32 U/L (ref 15–37)
BASOPHILS # BLD: 0 K/UL (ref 0–0.1)
BASOPHILS NFR BLD: 0 % (ref 0–1)
BILIRUB SERPL-MCNC: 0.4 MG/DL (ref 0.2–1)
BUN SERPL-MCNC: 13 MG/DL (ref 6–20)
BUN/CREAT SERPL: 17 (ref 12–20)
CALCIUM SERPL-MCNC: 8.2 MG/DL (ref 8.5–10.1)
CHLORIDE SERPL-SCNC: 105 MMOL/L (ref 97–108)
CO2 SERPL-SCNC: 24 MMOL/L (ref 21–32)
CREAT SERPL-MCNC: 0.76 MG/DL (ref 0.55–1.02)
DIFFERENTIAL METHOD BLD: ABNORMAL
EOSINOPHIL # BLD: 0 K/UL (ref 0–0.4)
EOSINOPHIL NFR BLD: 0 % (ref 0–7)
ERYTHROCYTE [DISTWIDTH] IN BLOOD BY AUTOMATED COUNT: 13.2 % (ref 11.5–14.5)
GLOBULIN SER CALC-MCNC: 4.3 G/DL (ref 2–4)
GLUCOSE SERPL-MCNC: 86 MG/DL (ref 65–100)
HCT VFR BLD AUTO: 28.4 % (ref 35–47)
HGB BLD-MCNC: 9.4 G/DL (ref 11.5–16)
IMM GRANULOCYTES # BLD AUTO: 0 K/UL (ref 0–0.04)
IMM GRANULOCYTES NFR BLD AUTO: 0 % (ref 0–0.5)
LYMPHOCYTES # BLD: 0.4 K/UL (ref 0.8–3.5)
LYMPHOCYTES NFR BLD: 8 % (ref 12–49)
MCH RBC QN AUTO: 29.3 PG (ref 26–34)
MCHC RBC AUTO-ENTMCNC: 33.1 G/DL (ref 30–36.5)
MCV RBC AUTO: 88.5 FL (ref 80–99)
MONOCYTES # BLD: 0.4 K/UL (ref 0–1)
MONOCYTES NFR BLD: 8 % (ref 5–13)
NEUTS SEG # BLD: 4.5 K/UL (ref 1.8–8)
NEUTS SEG NFR BLD: 84 % (ref 32–75)
NRBC # BLD: 0 K/UL (ref 0–0.01)
NRBC BLD-RTO: 0 PER 100 WBC
PLATELET # BLD AUTO: 239 K/UL (ref 150–400)
PMV BLD AUTO: 10.7 FL (ref 8.9–12.9)
POTASSIUM SERPL-SCNC: 3.5 MMOL/L (ref 3.5–5.1)
PROT SERPL-MCNC: 6.4 G/DL (ref 6.4–8.2)
RBC # BLD AUTO: 3.21 M/UL (ref 3.8–5.2)
RBC MORPH BLD: ABNORMAL
RBC MORPH BLD: ABNORMAL
SODIUM SERPL-SCNC: 137 MMOL/L (ref 136–145)
WBC # BLD AUTO: 5.3 K/UL (ref 3.6–11)

## 2023-07-25 PROCEDURE — 6370000000 HC RX 637 (ALT 250 FOR IP): Performed by: STUDENT IN AN ORGANIZED HEALTH CARE EDUCATION/TRAINING PROGRAM

## 2023-07-25 PROCEDURE — 36415 COLL VENOUS BLD VENIPUNCTURE: CPT

## 2023-07-25 PROCEDURE — 85025 COMPLETE CBC W/AUTO DIFF WBC: CPT

## 2023-07-25 PROCEDURE — 6370000000 HC RX 637 (ALT 250 FOR IP): Performed by: INTERNAL MEDICINE

## 2023-07-25 PROCEDURE — 1100000003 HC PRIVATE W/ TELEMETRY

## 2023-07-25 PROCEDURE — 80053 COMPREHEN METABOLIC PANEL: CPT

## 2023-07-25 PROCEDURE — 6360000002 HC RX W HCPCS: Performed by: STUDENT IN AN ORGANIZED HEALTH CARE EDUCATION/TRAINING PROGRAM

## 2023-07-25 PROCEDURE — 2580000003 HC RX 258: Performed by: STUDENT IN AN ORGANIZED HEALTH CARE EDUCATION/TRAINING PROGRAM

## 2023-07-25 RX ADMIN — NYSTATIN 500000 UNITS: 100000 SUSPENSION ORAL at 22:09

## 2023-07-25 RX ADMIN — HYDROXYCHLOROQUINE SULFATE 400 MG: 200 TABLET ORAL at 10:21

## 2023-07-25 RX ADMIN — NYSTATIN 500000 UNITS: 100000 SUSPENSION ORAL at 13:04

## 2023-07-25 RX ADMIN — AMPICILLIN SODIUM AND SULBACTAM SODIUM 3000 MG: 2; 1 INJECTION, POWDER, FOR SOLUTION INTRAMUSCULAR; INTRAVENOUS at 23:18

## 2023-07-25 RX ADMIN — FERROUS SULFATE TAB 325 MG (65 MG ELEMENTAL FE) 325 MG: 325 (65 FE) TAB at 10:21

## 2023-07-25 RX ADMIN — GUAIFENESIN 600 MG: 600 TABLET, EXTENDED RELEASE ORAL at 10:21

## 2023-07-25 RX ADMIN — DOXYCYCLINE HYCLATE 100 MG: 100 TABLET, COATED ORAL at 22:09

## 2023-07-25 RX ADMIN — SODIUM CHLORIDE, PRESERVATIVE FREE 10 ML: 5 INJECTION INTRAVENOUS at 10:31

## 2023-07-25 RX ADMIN — SODIUM CHLORIDE, PRESERVATIVE FREE 10 ML: 5 INJECTION INTRAVENOUS at 22:13

## 2023-07-25 RX ADMIN — RIVAROXABAN 20 MG: 20 TABLET, FILM COATED ORAL at 17:14

## 2023-07-25 RX ADMIN — AMPICILLIN SODIUM AND SULBACTAM SODIUM 3000 MG: 2; 1 INJECTION, POWDER, FOR SOLUTION INTRAMUSCULAR; INTRAVENOUS at 05:58

## 2023-07-25 RX ADMIN — GUAIFENESIN 600 MG: 600 TABLET, EXTENDED RELEASE ORAL at 22:10

## 2023-07-25 RX ADMIN — DOXYCYCLINE HYCLATE 100 MG: 100 TABLET, COATED ORAL at 10:21

## 2023-07-25 RX ADMIN — BUMETANIDE 2 MG: 1 TABLET ORAL at 22:10

## 2023-07-25 RX ADMIN — BUMETANIDE 2 MG: 1 TABLET ORAL at 10:21

## 2023-07-25 RX ADMIN — AMPICILLIN SODIUM AND SULBACTAM SODIUM 3000 MG: 2; 1 INJECTION, POWDER, FOR SOLUTION INTRAMUSCULAR; INTRAVENOUS at 13:04

## 2023-07-25 RX ADMIN — PREDNISONE 10 MG: 5 TABLET ORAL at 10:21

## 2023-07-25 RX ADMIN — NYSTATIN 500000 UNITS: 100000 SUSPENSION ORAL at 10:21

## 2023-07-25 RX ADMIN — NYSTATIN 500000 UNITS: 100000 SUSPENSION ORAL at 17:14

## 2023-07-25 RX ADMIN — AMPICILLIN SODIUM AND SULBACTAM SODIUM 3000 MG: 2; 1 INJECTION, POWDER, FOR SOLUTION INTRAMUSCULAR; INTRAVENOUS at 17:14

## 2023-07-25 NOTE — CONSULTS
Pulmonary, Critical Care, and Sleep Medicine~Consult Note    Name: Allie Porter MRN: [id number]   : 2000 Hospital[facility]   Date: 2023 8:36 AM Admission: 2023     IMPRESSION:   Acute hypoxic respiratory failure  Severe RUL/RLL Community , immunosuppressed-clinically she seems to be doing ok. NO Hypoxia. NO aspiration  Has minor oral thrush. Hilar/mediastinal LAD, likely reactive  Lupus, lupus nephritis - on hydroxychloroquine, MMF, Lupkynis and prednisone  PTE 2022 on Xarelto      PLAN:   Supplemental o2-has not needed. Sputum culture, MRSA screen, strep pneumo/legionella ag, mycoplasma  Treat oral thrush. Abx - on Unasyn, doxycycline  Will need a repeat CT of chest in 4 weeks. Will need to come see me as an outpt for follow up. Would consult rheumatology - ? Hold any immunosuppressants   Can repeat CXR in am.      Daily Progression:  23:   Last 24 hrs:   PT has been feeling a little better. Still no sputum production. She had prior pneumonia last year during November. Was sick for about 2 week. NO sinus issues. NO aspiration. NO GERD. No animal exposure. NO sick contacts. Denies tobacco or Etoh use. 16yo obese female nonsmoker w/ lupus, lupus nephritis, hx PTE on Xarelto. Admitted 23 w/ dyspnea, cp. 82% on RA in ED. CTA w/ severe RUL/RLL PNA, negative for PTE, small bilateral pleural effusions. Symptoms began yesterday morning. Had dyspnea. Minimal cough, productive. Sputum was clear. No fevers. Slept most of the day. No cp. No dysphagia, GERD sxs. Lifelong nonsmoker w/out history of asthma. Followed outpt by Dr. Tish Tyson. Note reviewed. On MMF, HCQ, Lupkynis, weaning off prednisone. Prior hospitalization 2022 w/ acute bilateral PTE, left renal vein thrombosis and subsequently diagnosed with lupus. Has been compliant w/ Xarelto outpt. 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 Boyfriend at bedside.      Past

## 2023-07-26 ENCOUNTER — APPOINTMENT (OUTPATIENT)
Facility: HOSPITAL | Age: 23
DRG: 193 | End: 2023-07-26
Payer: COMMERCIAL

## 2023-07-26 VITALS
HEART RATE: 95 BPM | SYSTOLIC BLOOD PRESSURE: 146 MMHG | BODY MASS INDEX: 53.47 KG/M2 | DIASTOLIC BLOOD PRESSURE: 95 MMHG | OXYGEN SATURATION: 100 % | TEMPERATURE: 98.2 F | WEIGHT: 293 LBS | RESPIRATION RATE: 16 BRPM

## 2023-07-26 LAB
ANION GAP SERPL CALC-SCNC: 7 MMOL/L (ref 5–15)
BASOPHILS # BLD: 0 K/UL (ref 0–0.1)
BASOPHILS NFR BLD: 0 % (ref 0–1)
BUN SERPL-MCNC: 12 MG/DL (ref 6–20)
BUN/CREAT SERPL: 16 (ref 12–20)
CALCIUM SERPL-MCNC: 8 MG/DL (ref 8.5–10.1)
CHLORIDE SERPL-SCNC: 105 MMOL/L (ref 97–108)
CO2 SERPL-SCNC: 26 MMOL/L (ref 21–32)
CREAT SERPL-MCNC: 0.74 MG/DL (ref 0.55–1.02)
DIFFERENTIAL METHOD BLD: ABNORMAL
EOSINOPHIL # BLD: 0 K/UL (ref 0–0.4)
EOSINOPHIL NFR BLD: 0 % (ref 0–7)
ERYTHROCYTE [DISTWIDTH] IN BLOOD BY AUTOMATED COUNT: 12.9 % (ref 11.5–14.5)
FLUID CULTURE: NORMAL
GLUCOSE SERPL-MCNC: 85 MG/DL (ref 65–100)
HCT VFR BLD AUTO: 25.7 % (ref 35–47)
HGB BLD-MCNC: 8.6 G/DL (ref 11.5–16)
IMM GRANULOCYTES # BLD AUTO: 0 K/UL (ref 0–0.04)
IMM GRANULOCYTES NFR BLD AUTO: 0 % (ref 0–0.5)
L PNEUMO1 AG UR QL IA: NEGATIVE
LYMPHOCYTES # BLD: 0.5 K/UL (ref 0.8–3.5)
LYMPHOCYTES NFR BLD: 11 % (ref 12–49)
Lab: NORMAL
M PNEUMO IGG SER IA-ACNC: 194 U/ML (ref 0–99)
M PNEUMO IGM SER IA-ACNC: <770 U/ML (ref 0–769)
MCH RBC QN AUTO: 29.4 PG (ref 26–34)
MCHC RBC AUTO-ENTMCNC: 33.5 G/DL (ref 30–36.5)
MCV RBC AUTO: 87.7 FL (ref 80–99)
MONOCYTES # BLD: 0.5 K/UL (ref 0–1)
MONOCYTES NFR BLD: 12 % (ref 5–13)
NEUTS SEG # BLD: 3.4 K/UL (ref 1.8–8)
NEUTS SEG NFR BLD: 77 % (ref 32–75)
NRBC # BLD: 0 K/UL (ref 0–0.01)
NRBC BLD-RTO: 0 PER 100 WBC
ORGANISM ID: NORMAL
PLATELET # BLD AUTO: 206 K/UL (ref 150–400)
PMV BLD AUTO: 10.4 FL (ref 8.9–12.9)
POTASSIUM SERPL-SCNC: 3.2 MMOL/L (ref 3.5–5.1)
RBC # BLD AUTO: 2.93 M/UL (ref 3.8–5.2)
S PNEUM AG SPEC QL LA: NEGATIVE
SODIUM SERPL-SCNC: 138 MMOL/L (ref 136–145)
SPECIMEN SOURCE: NORMAL
SPECIMEN SOURCE: NORMAL
SPECIMEN: NORMAL
WBC # BLD AUTO: 4.4 K/UL (ref 3.6–11)

## 2023-07-26 PROCEDURE — 6360000002 HC RX W HCPCS: Performed by: STUDENT IN AN ORGANIZED HEALTH CARE EDUCATION/TRAINING PROGRAM

## 2023-07-26 PROCEDURE — 36415 COLL VENOUS BLD VENIPUNCTURE: CPT

## 2023-07-26 PROCEDURE — 71045 X-RAY EXAM CHEST 1 VIEW: CPT

## 2023-07-26 PROCEDURE — 85025 COMPLETE CBC W/AUTO DIFF WBC: CPT

## 2023-07-26 PROCEDURE — 6370000000 HC RX 637 (ALT 250 FOR IP): Performed by: INTERNAL MEDICINE

## 2023-07-26 PROCEDURE — 6370000000 HC RX 637 (ALT 250 FOR IP): Performed by: STUDENT IN AN ORGANIZED HEALTH CARE EDUCATION/TRAINING PROGRAM

## 2023-07-26 PROCEDURE — 2580000003 HC RX 258: Performed by: STUDENT IN AN ORGANIZED HEALTH CARE EDUCATION/TRAINING PROGRAM

## 2023-07-26 PROCEDURE — 80048 BASIC METABOLIC PNL TOTAL CA: CPT

## 2023-07-26 PROCEDURE — 6370000000 HC RX 637 (ALT 250 FOR IP)

## 2023-07-26 RX ORDER — AMOXICILLIN AND CLAVULANATE POTASSIUM 875; 125 MG/1; MG/1
1 TABLET, FILM COATED ORAL EVERY 12 HOURS SCHEDULED
Qty: 10 TABLET | Refills: 0 | Status: SHIPPED | OUTPATIENT
Start: 2023-07-27 | End: 2023-08-01

## 2023-07-26 RX ORDER — GUAIFENESIN 600 MG/1
600 TABLET, EXTENDED RELEASE ORAL 2 TIMES DAILY
Qty: 20 TABLET | Refills: 0 | Status: SHIPPED | OUTPATIENT
Start: 2023-07-26 | End: 2023-08-05

## 2023-07-26 RX ORDER — DOXYCYCLINE HYCLATE 100 MG
100 TABLET ORAL EVERY 12 HOURS SCHEDULED
Qty: 5 TABLET | Refills: 0 | Status: SHIPPED | OUTPATIENT
Start: 2023-07-26 | End: 2023-07-29

## 2023-07-26 RX ORDER — AMOXICILLIN AND CLAVULANATE POTASSIUM 875; 125 MG/1; MG/1
1 TABLET, FILM COATED ORAL EVERY 12 HOURS SCHEDULED
Status: DISCONTINUED | OUTPATIENT
Start: 2023-07-27 | End: 2023-07-26 | Stop reason: HOSPADM

## 2023-07-26 RX ORDER — MYCOPHENOLATE MOFETIL 500 MG/1
1500 TABLET ORAL 2 TIMES DAILY
Qty: 60 TABLET | Refills: 0 | Status: SHIPPED
Start: 2023-08-01

## 2023-07-26 RX ADMIN — NYSTATIN 500000 UNITS: 100000 SUSPENSION ORAL at 12:41

## 2023-07-26 RX ADMIN — AMPICILLIN SODIUM AND SULBACTAM SODIUM 3000 MG: 2; 1 INJECTION, POWDER, FOR SOLUTION INTRAMUSCULAR; INTRAVENOUS at 06:30

## 2023-07-26 RX ADMIN — PREDNISONE 10 MG: 5 TABLET ORAL at 09:12

## 2023-07-26 RX ADMIN — GUAIFENESIN 600 MG: 600 TABLET, EXTENDED RELEASE ORAL at 09:12

## 2023-07-26 RX ADMIN — POTASSIUM BICARBONATE 40 MEQ: 782 TABLET, EFFERVESCENT ORAL at 09:11

## 2023-07-26 RX ADMIN — AMPICILLIN SODIUM AND SULBACTAM SODIUM 3000 MG: 2; 1 INJECTION, POWDER, FOR SOLUTION INTRAMUSCULAR; INTRAVENOUS at 12:41

## 2023-07-26 RX ADMIN — DOXYCYCLINE HYCLATE 100 MG: 100 TABLET, COATED ORAL at 09:12

## 2023-07-26 RX ADMIN — FERROUS SULFATE TAB 325 MG (65 MG ELEMENTAL FE) 325 MG: 325 (65 FE) TAB at 09:12

## 2023-07-26 RX ADMIN — BUMETANIDE 2 MG: 1 TABLET ORAL at 09:53

## 2023-07-26 RX ADMIN — NYSTATIN 500000 UNITS: 100000 SUSPENSION ORAL at 09:11

## 2023-07-26 RX ADMIN — HYDROXYCHLOROQUINE SULFATE 400 MG: 200 TABLET ORAL at 09:12

## 2023-07-26 NOTE — DISCHARGE SUMMARY
Code  Recommended diet: regular diet  Recommended activity: activity as tolerated  Wound care: None      Follow up with:   PCP : Nadira Kruger, [address] [facility], [address] [facility] [address] [facility], [facility]  [address] [facility]  [phone]    Follow up      DOMINICK Arriaga - NP  [address] [facility] [address] [facility], [address]  [facility]  [phone]    Follow up in 2 week(s)            Total time in minutes spent coordinating this discharge (includes going over instructions, follow-up, prescriptions, and preparing report for sign off to her PCP) :  35 minutes

## 2023-07-26 NOTE — DISCHARGE INSTRUCTIONS
Per Rheumatology, you should NOT TAKE your cellcept and lupkynis until BOTH ANTIBIOTICS ARE COMPLETE which should be 7/31/23. You can resume these on 8/1/23. You may CONTINUE to take plaquenil (hydroxychloroquine) and prednisone.

## 2023-07-27 ENCOUNTER — TELEPHONE (OUTPATIENT)
Age: 23
End: 2023-07-27

## 2023-07-27 LAB
BACTERIA SPEC CULT: NORMAL
GRAM STN SPEC: NORMAL
SERVICE CMNT-IMP: NORMAL

## 2023-07-27 NOTE — TELEPHONE ENCOUNTER
Please call Michelle Ceballos . Pt was discharged from [facility]. Follow up  Appointment  is on 7/31/23.

## 2023-07-31 ENCOUNTER — OFFICE VISIT (OUTPATIENT)
Age: 23
End: 2023-07-31
Payer: COMMERCIAL

## 2023-07-31 VITALS
HEIGHT: 64 IN | HEART RATE: 104 BPM | RESPIRATION RATE: 22 BRPM | WEIGHT: 293 LBS | OXYGEN SATURATION: 99 % | SYSTOLIC BLOOD PRESSURE: 140 MMHG | DIASTOLIC BLOOD PRESSURE: 84 MMHG | TEMPERATURE: 97.9 F | BODY MASS INDEX: 50.02 KG/M2

## 2023-07-31 DIAGNOSIS — Z09 HOSPITAL DISCHARGE FOLLOW-UP: Primary | ICD-10-CM

## 2023-07-31 DIAGNOSIS — E87.6 HYPOKALEMIA: ICD-10-CM

## 2023-07-31 DIAGNOSIS — D50.0 IRON DEFICIENCY ANEMIA SECONDARY TO BLOOD LOSS (CHRONIC): ICD-10-CM

## 2023-07-31 DIAGNOSIS — R59.0 HILAR LYMPHADENOPATHY: ICD-10-CM

## 2023-07-31 PROCEDURE — 1111F DSCHRG MED/CURRENT MED MERGE: CPT

## 2023-07-31 PROCEDURE — 99214 OFFICE O/P EST MOD 30 MIN: CPT

## 2023-07-31 RX ORDER — RIVAROXABAN 10 MG/1
TABLET, FILM COATED ORAL
COMMUNITY
Start: 2023-07-19

## 2023-07-31 RX ORDER — VOCLOSPORIN 7.9 MG/1
7.9 CAPSULE ORAL 3 TIMES DAILY
COMMUNITY
Start: 2023-07-05

## 2023-07-31 RX ORDER — LOSARTAN POTASSIUM 50 MG/1
TABLET ORAL
COMMUNITY
Start: 2023-07-18

## 2023-07-31 SDOH — ECONOMIC STABILITY: HOUSING INSECURITY: IN THE LAST 12 MONTHS, HOW MANY PLACES HAVE YOU LIVED?: 1

## 2023-07-31 SDOH — ECONOMIC STABILITY: TRANSPORTATION INSECURITY
IN THE PAST 12 MONTHS, HAS LACK OF TRANSPORTATION KEPT YOU FROM MEETINGS, WORK, OR FROM GETTING THINGS NEEDED FOR DAILY LIVING?: NO

## 2023-07-31 SDOH — ECONOMIC STABILITY: INCOME INSECURITY: IN THE LAST 12 MONTHS, WAS THERE A TIME WHEN YOU WERE NOT ABLE TO PAY THE MORTGAGE OR RENT ON TIME?: NO

## 2023-07-31 SDOH — ECONOMIC STABILITY: FOOD INSECURITY: WITHIN THE PAST 12 MONTHS, YOU WORRIED THAT YOUR FOOD WOULD RUN OUT BEFORE YOU GOT MONEY TO BUY MORE.: NEVER TRUE

## 2023-07-31 SDOH — ECONOMIC STABILITY: FOOD INSECURITY: WITHIN THE PAST 12 MONTHS, THE FOOD YOU BOUGHT JUST DIDN'T LAST AND YOU DIDN'T HAVE MONEY TO GET MORE.: NEVER TRUE

## 2023-07-31 SDOH — ECONOMIC STABILITY: HOUSING INSECURITY
IN THE LAST 12 MONTHS, WAS THERE A TIME WHEN YOU DID NOT HAVE A STEADY PLACE TO SLEEP OR SLEPT IN A SHELTER (INCLUDING NOW)?: NO

## 2023-07-31 SDOH — ECONOMIC STABILITY: TRANSPORTATION INSECURITY
IN THE PAST 12 MONTHS, HAS THE LACK OF TRANSPORTATION KEPT YOU FROM MEDICAL APPOINTMENTS OR FROM GETTING MEDICATIONS?: NO

## 2023-07-31 SDOH — HEALTH STABILITY: PHYSICAL HEALTH: ON AVERAGE, HOW MANY DAYS PER WEEK DO YOU ENGAGE IN MODERATE TO STRENUOUS EXERCISE (LIKE A BRISK WALK)?: 0 DAYS

## 2023-07-31 SDOH — HEALTH STABILITY: PHYSICAL HEALTH: ON AVERAGE, HOW MANY MINUTES DO YOU ENGAGE IN EXERCISE AT THIS LEVEL?: 0 MIN

## 2023-07-31 ASSESSMENT — SOCIAL DETERMINANTS OF HEALTH (SDOH)
WITHIN THE LAST YEAR, HAVE YOU BEEN HUMILIATED OR EMOTIONALLY ABUSED IN OTHER WAYS BY YOUR PARTNER OR EX-PARTNER?: NO
WITHIN THE LAST YEAR, HAVE YOU BEEN AFRAID OF YOUR PARTNER OR EX-PARTNER?: NO
WITHIN THE LAST YEAR, HAVE YOU BEEN KICKED, HIT, SLAPPED, OR OTHERWISE PHYSICALLY HURT BY YOUR PARTNER OR EX-PARTNER?: NO
ARE YOU MARRIED, WIDOWED, DIVORCED, SEPARATED, NEVER MARRIED, OR LIVING WITH A PARTNER?: NEVER MARRIED
HOW HARD IS IT FOR YOU TO PAY FOR THE VERY BASICS LIKE FOOD, HOUSING, MEDICAL CARE, AND HEATING?: SOMEWHAT HARD
HOW OFTEN DO YOU GET TOGETHER WITH FRIENDS OR RELATIVES?: ONCE A WEEK
DO YOU BELONG TO ANY CLUBS OR ORGANIZATIONS SUCH AS CHURCH GROUPS UNIONS, FRATERNAL OR ATHLETIC GROUPS, OR SCHOOL GROUPS?: NO
WITHIN THE LAST YEAR, HAVE TO BEEN RAPED OR FORCED TO HAVE ANY KIND OF SEXUAL ACTIVITY BY YOUR PARTNER OR EX-PARTNER?: NO
HOW OFTEN DO YOU ATTENT MEETINGS OF THE CLUB OR ORGANIZATION YOU BELONG TO?: NEVER
IN A TYPICAL WEEK, HOW MANY TIMES DO YOU TALK ON THE PHONE WITH FAMILY, FRIENDS, OR NEIGHBORS?: MORE THAN THREE TIMES A WEEK
HOW OFTEN DO YOU ATTEND CHURCH OR RELIGIOUS SERVICES?: 1 TO 4 TIMES PER YEAR

## 2023-07-31 ASSESSMENT — PATIENT HEALTH QUESTIONNAIRE - PHQ9
1. LITTLE INTEREST OR PLEASURE IN DOING THINGS: 0
SUM OF ALL RESPONSES TO PHQ QUESTIONS 1-9: 0
SUM OF ALL RESPONSES TO PHQ9 QUESTIONS 1 & 2: 0
SUM OF ALL RESPONSES TO PHQ QUESTIONS 1-9: 0
2. FEELING DOWN, DEPRESSED OR HOPELESS: 0

## 2023-07-31 NOTE — PROGRESS NOTES
Post-Discharge Transitional Care  Follow Up      Dariusz Sarabia   YOB: 2000    Date of Office Visit:  7/31/2023  Date of Hospital Admission: 7/23/23  Date of Hospital Discharge: 7/26/23  Risk of hospital readmission (high >=14%. Medium >=10%) :Readmission Risk Score: 5.2      Care management risk score Rising risk (score 2-5) and Complex Care (Scores >=6): No Risk Score On File     Non face to face  following discharge, date last encounter closed (first attempt may have been earlier): *No documented post hospital discharge outreach found in the last 14 days    Call initiated 2 business days of discharge: *No response recorded in the last 14 days    ASSESSMENT/PLAN:   Hospital discharge follow-up  -     GA DISCHARGE MEDS RECONCILED W/ CURRENT OUTPATIENT MED LIST  Hilar lymphadenopathy  -     CT CHEST W CONTRAST; Future  Iron deficiency anemia secondary to blood loss (chronic)  -     GA COLLECTION VENOUS BLOOD VENIPUNCTURE  -     CBC with Auto Differential; Future  Hypokalemia  -     GA COLLECTION VENOUS BLOOD VENIPUNCTURE  -     Basic Metabolic Panel; Future    Repeat CT chest in 6 weeks to assess for resolution of hilar lymphadenopathy. Will repeat H/H and K today. BP just slightly above goal; recheck this in 3 months and adjust treatment as needed. Medical Decision Making: moderate complexity  Return in 3 months (on 10/31/2023). On this date 7/31/2023 I have spent 30 minutes reviewing previous notes, test results and face to face with the patient discussing the diagnosis and importance of compliance with the treatment plan as well as documenting on the day of the visit. Subjective:   HPI:  Follow up of Hospital problems/diagnosis(es): Right upper/lower PNA with hypoxic respiratory failure in immunosuppressed state    Inpatient course: Discharge summary reviewed- see chart.     Interval history/Current status: Doing well, improved to preadmission level of health; completed course of

## 2023-08-01 DIAGNOSIS — E87.6 HYPOKALEMIA: Primary | ICD-10-CM

## 2023-08-01 LAB
ANION GAP SERPL CALC-SCNC: 10 MMOL/L (ref 5–15)
BASOPHILS # BLD: 0 K/UL (ref 0–0.1)
BASOPHILS NFR BLD: 0 % (ref 0–1)
BUN SERPL-MCNC: 8 MG/DL (ref 6–20)
BUN/CREAT SERPL: 11 (ref 12–20)
CALCIUM SERPL-MCNC: 8.2 MG/DL (ref 8.5–10.1)
CHLORIDE SERPL-SCNC: 106 MMOL/L (ref 97–108)
CO2 SERPL-SCNC: 24 MMOL/L (ref 21–32)
CREAT SERPL-MCNC: 0.75 MG/DL (ref 0.55–1.02)
DIFFERENTIAL METHOD BLD: ABNORMAL
EOSINOPHIL # BLD: 0 K/UL (ref 0–0.4)
EOSINOPHIL NFR BLD: 0 % (ref 0–7)
ERYTHROCYTE [DISTWIDTH] IN BLOOD BY AUTOMATED COUNT: 13 % (ref 11.5–14.5)
GLUCOSE SERPL-MCNC: 96 MG/DL (ref 65–100)
HCT VFR BLD AUTO: 31.5 % (ref 35–47)
HGB BLD-MCNC: 10 G/DL (ref 11.5–16)
IMM GRANULOCYTES # BLD AUTO: 0.1 K/UL (ref 0–0.04)
IMM GRANULOCYTES NFR BLD AUTO: 1 % (ref 0–0.5)
LYMPHOCYTES # BLD: 0.5 K/UL (ref 0.8–3.5)
LYMPHOCYTES NFR BLD: 7 % (ref 12–49)
MCH RBC QN AUTO: 28.7 PG (ref 26–34)
MCHC RBC AUTO-ENTMCNC: 31.7 G/DL (ref 30–36.5)
MCV RBC AUTO: 90.5 FL (ref 80–99)
MONOCYTES # BLD: 0.5 K/UL (ref 0–1)
MONOCYTES NFR BLD: 7 % (ref 5–13)
NEUTS SEG # BLD: 6.6 K/UL (ref 1.8–8)
NEUTS SEG NFR BLD: 85 % (ref 32–75)
NRBC # BLD: 0 K/UL (ref 0–0.01)
NRBC BLD-RTO: 0 PER 100 WBC
PLATELET # BLD AUTO: 314 K/UL (ref 150–400)
PMV BLD AUTO: 10.8 FL (ref 8.9–12.9)
POTASSIUM SERPL-SCNC: 3.4 MMOL/L (ref 3.5–5.1)
RBC # BLD AUTO: 3.48 M/UL (ref 3.8–5.2)
RBC MORPH BLD: ABNORMAL
RBC MORPH BLD: ABNORMAL
SODIUM SERPL-SCNC: 140 MMOL/L (ref 136–145)
WBC # BLD AUTO: 7.7 K/UL (ref 3.6–11)

## 2023-08-01 RX ORDER — POTASSIUM CHLORIDE 20 MEQ/1
20 TABLET, EXTENDED RELEASE ORAL DAILY
Qty: 7 TABLET | Refills: 0 | Status: SHIPPED | OUTPATIENT
Start: 2023-08-01 | End: 2023-08-08

## 2023-08-12 ENCOUNTER — HOSPITAL ENCOUNTER (OUTPATIENT)
Facility: HOSPITAL | Age: 23
End: 2023-08-12
Attending: INTERNAL MEDICINE
Payer: COMMERCIAL

## 2023-08-12 DIAGNOSIS — J18.9: ICD-10-CM

## 2023-08-12 PROCEDURE — 71250 CT THORAX DX C-: CPT

## 2023-10-16 ENCOUNTER — OFFICE VISIT (OUTPATIENT)
Age: 23
End: 2023-10-16
Payer: COMMERCIAL

## 2023-10-16 VITALS
RESPIRATION RATE: 19 BRPM | SYSTOLIC BLOOD PRESSURE: 132 MMHG | WEIGHT: 293 LBS | DIASTOLIC BLOOD PRESSURE: 78 MMHG | BODY MASS INDEX: 50.02 KG/M2 | OXYGEN SATURATION: 100 % | HEIGHT: 64 IN | HEART RATE: 85 BPM

## 2023-10-16 DIAGNOSIS — J98.4 RESTRICTIVE LUNG DISEASE SECONDARY TO OBESITY: ICD-10-CM

## 2023-10-16 DIAGNOSIS — E55.9 VITAMIN D DEFICIENCY: ICD-10-CM

## 2023-10-16 DIAGNOSIS — Z86.718 HISTORY OF RENAL VEIN THROMBOSIS: ICD-10-CM

## 2023-10-16 DIAGNOSIS — Z23 ENCOUNTER FOR IMMUNIZATION: ICD-10-CM

## 2023-10-16 DIAGNOSIS — I15.2 HYPERTENSION DUE TO ENDOCRINE DISORDER: ICD-10-CM

## 2023-10-16 DIAGNOSIS — E66.9 RESTRICTIVE LUNG DISEASE SECONDARY TO OBESITY: ICD-10-CM

## 2023-10-16 DIAGNOSIS — L83 ACANTHOSIS NIGRICANS: ICD-10-CM

## 2023-10-16 DIAGNOSIS — M32.14 SLE GLOMERULONEPHRITIS SYNDROME (HCC): ICD-10-CM

## 2023-10-16 DIAGNOSIS — Z00.00 ENCOUNTER FOR WELL ADULT EXAM WITHOUT ABNORMAL FINDINGS: Primary | ICD-10-CM

## 2023-10-16 DIAGNOSIS — D84.9 IMMUNOSUPPRESSED STATUS (HCC): ICD-10-CM

## 2023-10-16 DIAGNOSIS — E04.2 NONTOXIC MULTINODULAR GOITER: ICD-10-CM

## 2023-10-16 DIAGNOSIS — D50.0 IRON DEFICIENCY ANEMIA SECONDARY TO BLOOD LOSS (CHRONIC): ICD-10-CM

## 2023-10-16 DIAGNOSIS — Z86.711 HISTORY OF PULMONARY EMBOLUS (PE): ICD-10-CM

## 2023-10-16 DIAGNOSIS — M32.9 SLE (SYSTEMIC LUPUS ERYTHEMATOSUS RELATED SYNDROME) (HCC): ICD-10-CM

## 2023-10-16 DIAGNOSIS — Z79.01 ANTICOAGULANT LONG-TERM USE: ICD-10-CM

## 2023-10-16 DIAGNOSIS — E66.01 CLASS 3 SEVERE OBESITY DUE TO EXCESS CALORIES WITH SERIOUS COMORBIDITY AND BODY MASS INDEX (BMI) OF 50.0 TO 59.9 IN ADULT (HCC): ICD-10-CM

## 2023-10-16 PROCEDURE — 90674 CCIIV4 VAC NO PRSV 0.5 ML IM: CPT

## 2023-10-16 PROCEDURE — 90677 PCV20 VACCINE IM: CPT

## 2023-10-16 PROCEDURE — 90471 IMMUNIZATION ADMIN: CPT

## 2023-10-16 PROCEDURE — 99395 PREV VISIT EST AGE 18-39: CPT

## 2023-10-16 PROCEDURE — 36415 COLL VENOUS BLD VENIPUNCTURE: CPT

## 2023-10-16 PROCEDURE — 90472 IMMUNIZATION ADMIN EACH ADD: CPT

## 2023-10-16 ASSESSMENT — ENCOUNTER SYMPTOMS
ALLERGIC/IMMUNOLOGIC NEGATIVE: 1
EYES NEGATIVE: 1
WHEEZING: 0
COUGH: 0
CONSTIPATION: 0
BLOOD IN STOOL: 0
RESPIRATORY NEGATIVE: 1
SHORTNESS OF BREATH: 0
DIARRHEA: 0

## 2023-10-17 LAB
25(OH)D3 SERPL-MCNC: 14.8 NG/ML (ref 30–100)
ALBUMIN SERPL-MCNC: 2.9 G/DL (ref 3.5–5)
ALBUMIN/GLOB SERPL: 0.8 (ref 1.1–2.2)
ALP SERPL-CCNC: 70 U/L (ref 45–117)
ALT SERPL-CCNC: 19 U/L (ref 12–78)
ANION GAP SERPL CALC-SCNC: 5 MMOL/L (ref 5–15)
AST SERPL-CCNC: 14 U/L (ref 15–37)
BASOPHILS # BLD: 0 K/UL (ref 0–0.1)
BASOPHILS NFR BLD: 0 % (ref 0–1)
BILIRUB SERPL-MCNC: 0.2 MG/DL (ref 0.2–1)
BUN SERPL-MCNC: 14 MG/DL (ref 6–20)
BUN/CREAT SERPL: 14 (ref 12–20)
CALCIUM SERPL-MCNC: 8.7 MG/DL (ref 8.5–10.1)
CHLORIDE SERPL-SCNC: 108 MMOL/L (ref 97–108)
CO2 SERPL-SCNC: 28 MMOL/L (ref 21–32)
CREAT SERPL-MCNC: 1 MG/DL (ref 0.55–1.02)
DIFFERENTIAL METHOD BLD: ABNORMAL
EOSINOPHIL # BLD: 0 K/UL (ref 0–0.4)
EOSINOPHIL NFR BLD: 0 % (ref 0–7)
ERYTHROCYTE [DISTWIDTH] IN BLOOD BY AUTOMATED COUNT: 13.6 % (ref 11.5–14.5)
EST. AVERAGE GLUCOSE BLD GHB EST-MCNC: 100 MG/DL
FERRITIN SERPL-MCNC: 30 NG/ML (ref 26–388)
GLOBULIN SER CALC-MCNC: 3.8 G/DL (ref 2–4)
GLUCOSE SERPL-MCNC: 92 MG/DL (ref 65–100)
HBA1C MFR BLD: 5.1 % (ref 4–5.6)
HCT VFR BLD AUTO: 31 % (ref 35–47)
HGB BLD-MCNC: 9.5 G/DL (ref 11.5–16)
IMM GRANULOCYTES # BLD AUTO: 0 K/UL (ref 0–0.04)
IMM GRANULOCYTES NFR BLD AUTO: 1 % (ref 0–0.5)
IRON SATN MFR SERPL: 14 % (ref 20–50)
IRON SERPL-MCNC: 42 UG/DL (ref 35–150)
LYMPHOCYTES # BLD: 1.1 K/UL (ref 0.8–3.5)
LYMPHOCYTES NFR BLD: 22 % (ref 12–49)
MCH RBC QN AUTO: 28.4 PG (ref 26–34)
MCHC RBC AUTO-ENTMCNC: 30.6 G/DL (ref 30–36.5)
MCV RBC AUTO: 92.5 FL (ref 80–99)
MONOCYTES # BLD: 0.6 K/UL (ref 0–1)
MONOCYTES NFR BLD: 13 % (ref 5–13)
NEUTS SEG # BLD: 3.2 K/UL (ref 1.8–8)
NEUTS SEG NFR BLD: 64 % (ref 32–75)
NRBC # BLD: 0 K/UL (ref 0–0.01)
NRBC BLD-RTO: 0 PER 100 WBC
PLATELET # BLD AUTO: 240 K/UL (ref 150–400)
PMV BLD AUTO: 10.1 FL (ref 8.9–12.9)
POTASSIUM SERPL-SCNC: 3.7 MMOL/L (ref 3.5–5.1)
PROT SERPL-MCNC: 6.7 G/DL (ref 6.4–8.2)
RBC # BLD AUTO: 3.35 M/UL (ref 3.8–5.2)
SODIUM SERPL-SCNC: 141 MMOL/L (ref 136–145)
T4 FREE SERPL-MCNC: 0.9 NG/DL (ref 0.8–1.5)
TIBC SERPL-MCNC: 301 UG/DL (ref 250–450)
TSH SERPL DL<=0.05 MIU/L-ACNC: 5.03 UIU/ML (ref 0.36–3.74)
WBC # BLD AUTO: 5 K/UL (ref 3.6–11)

## 2023-10-20 DIAGNOSIS — E55.9 VITAMIN D DEFICIENCY: Primary | ICD-10-CM

## 2023-10-20 RX ORDER — ERGOCALCIFEROL 1.25 MG/1
50000 CAPSULE ORAL WEEKLY
Qty: 12 CAPSULE | Refills: 1 | Status: SHIPPED | OUTPATIENT
Start: 2023-10-20

## 2023-11-01 ENCOUNTER — APPOINTMENT (OUTPATIENT)
Facility: HOSPITAL | Age: 23
End: 2023-11-01
Payer: COMMERCIAL

## 2023-11-01 ENCOUNTER — HOSPITAL ENCOUNTER (INPATIENT)
Facility: HOSPITAL | Age: 23
LOS: 3 days | Discharge: HOME | End: 2023-11-04
Attending: STUDENT IN AN ORGANIZED HEALTH CARE EDUCATION/TRAINING PROGRAM | Admitting: INTERNAL MEDICINE
Payer: COMMERCIAL

## 2023-11-01 DIAGNOSIS — J96.01 ACUTE HYPOXIC RESPIRATORY FAILURE (HCC): Primary | ICD-10-CM

## 2023-11-01 DIAGNOSIS — E87.70 HYPERVOLEMIA, UNSPECIFIED HYPERVOLEMIA TYPE: ICD-10-CM

## 2023-11-01 PROBLEM — R09.02 HYPOXIA: Status: ACTIVE | Noted: 2023-11-01

## 2023-11-01 LAB
ALBUMIN SERPL-MCNC: 2.6 G/DL (ref 3.5–5)
ALBUMIN/GLOB SERPL: 0.6 (ref 1.1–2.2)
ALP SERPL-CCNC: 64 U/L (ref 45–117)
ALT SERPL-CCNC: 34 U/L (ref 12–78)
ANION GAP BLD CALC-SCNC: 10 (ref 10–20)
ANION GAP SERPL CALC-SCNC: 8 MMOL/L (ref 5–15)
APPEARANCE UR: ABNORMAL
AST SERPL-CCNC: 24 U/L (ref 15–37)
B PERT DNA SPEC QL NAA+PROBE: NOT DETECTED
BACTERIA URNS QL MICRO: ABNORMAL /HPF
BASE DEFICIT BLD-SCNC: 1.4 MMOL/L
BASOPHILS # BLD: 0 K/UL (ref 0–0.1)
BASOPHILS NFR BLD: 0 % (ref 0–1)
BILIRUB SERPL-MCNC: 0.2 MG/DL (ref 0.2–1)
BILIRUB UR QL: NEGATIVE
BORDETELLA PARAPERTUSSIS BY PCR: NOT DETECTED
BUN SERPL-MCNC: 15 MG/DL (ref 6–20)
BUN/CREAT SERPL: 14 (ref 12–20)
C PNEUM DNA SPEC QL NAA+PROBE: NOT DETECTED
CA-I BLD-MCNC: 1.18 MMOL/L (ref 1.12–1.32)
CALCIUM SERPL-MCNC: 8.3 MG/DL (ref 8.5–10.1)
CHLORIDE BLD-SCNC: 112 MMOL/L (ref 100–108)
CHLORIDE SERPL-SCNC: 112 MMOL/L (ref 97–108)
CO2 BLD-SCNC: 24 MMOL/L (ref 19–24)
CO2 SERPL-SCNC: 24 MMOL/L (ref 21–32)
COLOR UR: ABNORMAL
CREAT SERPL-MCNC: 1.05 MG/DL (ref 0.55–1.02)
CREAT UR-MCNC: 0.9 MG/DL (ref 0.6–1.3)
DIFFERENTIAL METHOD BLD: ABNORMAL
EKG ATRIAL RATE: 118 BPM
EKG DIAGNOSIS: NORMAL
EKG P AXIS: 56 DEGREES
EKG P-R INTERVAL: 142 MS
EKG Q-T INTERVAL: 324 MS
EKG QRS DURATION: 72 MS
EKG QTC CALCULATION (BAZETT): 454 MS
EKG R AXIS: 13 DEGREES
EKG T AXIS: 25 DEGREES
EKG VENTRICULAR RATE: 118 BPM
EOSINOPHIL # BLD: 0 K/UL (ref 0–0.4)
EOSINOPHIL NFR BLD: 0 % (ref 0–7)
EPITH CASTS URNS QL MICRO: ABNORMAL /LPF
ERYTHROCYTE [DISTWIDTH] IN BLOOD BY AUTOMATED COUNT: 13.6 % (ref 11.5–14.5)
FLUAV SUBTYP SPEC NAA+PROBE: NOT DETECTED
FLUBV RNA SPEC QL NAA+PROBE: NOT DETECTED
GLOBULIN SER CALC-MCNC: 4.1 G/DL (ref 2–4)
GLUCOSE BLD STRIP.AUTO-MCNC: 90 MG/DL (ref 74–106)
GLUCOSE SERPL-MCNC: 97 MG/DL (ref 65–100)
GLUCOSE UR STRIP.AUTO-MCNC: NEGATIVE MG/DL
GRAN CASTS URNS QL MICRO: ABNORMAL /LPF
HADV DNA SPEC QL NAA+PROBE: NOT DETECTED
HCG SERPL QL: NEGATIVE
HCO3 BLDA-SCNC: 24 MMOL/L
HCOV 229E RNA SPEC QL NAA+PROBE: NOT DETECTED
HCOV HKU1 RNA SPEC QL NAA+PROBE: NOT DETECTED
HCOV NL63 RNA SPEC QL NAA+PROBE: NOT DETECTED
HCOV OC43 RNA SPEC QL NAA+PROBE: NOT DETECTED
HCT VFR BLD AUTO: 25 % (ref 35–47)
HGB BLD-MCNC: 8.2 G/DL (ref 11.5–16)
HGB UR QL STRIP: ABNORMAL
HMPV RNA SPEC QL NAA+PROBE: NOT DETECTED
HPIV1 RNA SPEC QL NAA+PROBE: NOT DETECTED
HPIV2 RNA SPEC QL NAA+PROBE: NOT DETECTED
HPIV3 RNA SPEC QL NAA+PROBE: NOT DETECTED
HPIV4 RNA SPEC QL NAA+PROBE: NOT DETECTED
HYALINE CASTS URNS QL MICRO: ABNORMAL /LPF (ref 0–5)
IMM GRANULOCYTES # BLD AUTO: 0.1 K/UL (ref 0–0.04)
IMM GRANULOCYTES NFR BLD AUTO: 1 % (ref 0–0.5)
KETONES UR QL STRIP.AUTO: ABNORMAL MG/DL
LACTATE BLD-SCNC: 1.59 MMOL/L (ref 0.4–2)
LEUKOCYTE ESTERASE UR QL STRIP.AUTO: ABNORMAL
LYMPHOCYTES # BLD: 0.8 K/UL (ref 0.8–3.5)
LYMPHOCYTES NFR BLD: 9 % (ref 12–49)
M PNEUMO DNA SPEC QL NAA+PROBE: NOT DETECTED
MCH RBC QN AUTO: 28.9 PG (ref 26–34)
MCHC RBC AUTO-ENTMCNC: 32.8 G/DL (ref 30–36.5)
MCV RBC AUTO: 88 FL (ref 80–99)
MONOCYTES # BLD: 0.6 K/UL (ref 0–1)
MONOCYTES NFR BLD: 7 % (ref 5–13)
NEUTS SEG # BLD: 7.5 K/UL (ref 1.8–8)
NEUTS SEG NFR BLD: 83 % (ref 32–75)
NITRITE UR QL STRIP.AUTO: NEGATIVE
NRBC # BLD: 0 K/UL (ref 0–0.01)
NRBC BLD-RTO: 0 PER 100 WBC
NT PRO BNP: 1271 PG/ML
PCO2 BLDV: 40.4 MMHG (ref 41–51)
PH BLDV: 7.38 (ref 7.32–7.42)
PH UR STRIP: 5.5 (ref 5–8)
PLATELET # BLD AUTO: 247 K/UL (ref 150–400)
PMV BLD AUTO: 10.3 FL (ref 8.9–12.9)
PO2 BLDV: <27 MMHG (ref 25–40)
POTASSIUM BLD-SCNC: 3.4 MMOL/L (ref 3.5–5.5)
POTASSIUM SERPL-SCNC: 3.4 MMOL/L (ref 3.5–5.1)
PROCALCITONIN SERPL-MCNC: <0.05 NG/ML
PROT SERPL-MCNC: 6.7 G/DL (ref 6.4–8.2)
PROT UR STRIP-MCNC: >300 MG/DL
RBC # BLD AUTO: 2.84 M/UL (ref 3.8–5.2)
RBC #/AREA URNS HPF: >100 /HPF (ref 0–5)
RBC MORPH BLD: ABNORMAL
RSV RNA SPEC QL NAA+PROBE: NOT DETECTED
RV+EV RNA SPEC QL NAA+PROBE: NOT DETECTED
SARS-COV-2 RDRP RESP QL NAA+PROBE: NOT DETECTED
SARS-COV-2 RNA RESP QL NAA+PROBE: NOT DETECTED
SODIUM BLD-SCNC: 146 MMOL/L (ref 136–145)
SODIUM SERPL-SCNC: 144 MMOL/L (ref 136–145)
SOURCE: NORMAL
SP GR UR REFRACTOMETRY: 1.02
SPECIMEN SITE: ABNORMAL
TROPONIN I SERPL HS-MCNC: 29 NG/L (ref 0–51)
URINE CULTURE IF INDICATED: ABNORMAL
UROBILINOGEN UR QL STRIP.AUTO: 1 EU/DL (ref 0.2–1)
WBC # BLD AUTO: 9 K/UL (ref 3.6–11)
WBC URNS QL MICRO: ABNORMAL /HPF (ref 0–4)

## 2023-11-01 PROCEDURE — 80053 COMPREHEN METABOLIC PANEL: CPT

## 2023-11-01 PROCEDURE — 0202U NFCT DS 22 TRGT SARS-COV-2: CPT

## 2023-11-01 PROCEDURE — 84703 CHORIONIC GONADOTROPIN ASSAY: CPT

## 2023-11-01 PROCEDURE — 71045 X-RAY EXAM CHEST 1 VIEW: CPT

## 2023-11-01 PROCEDURE — 84145 PROCALCITONIN (PCT): CPT

## 2023-11-01 PROCEDURE — 2060000000 HC ICU INTERMEDIATE R&B

## 2023-11-01 PROCEDURE — 82803 BLOOD GASES ANY COMBINATION: CPT

## 2023-11-01 PROCEDURE — 6370000000 HC RX 637 (ALT 250 FOR IP): Performed by: STUDENT IN AN ORGANIZED HEALTH CARE EDUCATION/TRAINING PROGRAM

## 2023-11-01 PROCEDURE — 99285 EMERGENCY DEPT VISIT HI MDM: CPT

## 2023-11-01 PROCEDURE — 96374 THER/PROPH/DIAG INJ IV PUSH: CPT

## 2023-11-01 PROCEDURE — 85025 COMPLETE CBC W/AUTO DIFF WBC: CPT

## 2023-11-01 PROCEDURE — 2580000003 HC RX 258: Performed by: INTERNAL MEDICINE

## 2023-11-01 PROCEDURE — 87635 SARS-COV-2 COVID-19 AMP PRB: CPT

## 2023-11-01 PROCEDURE — 6360000002 HC RX W HCPCS: Performed by: INTERNAL MEDICINE

## 2023-11-01 PROCEDURE — 6370000000 HC RX 637 (ALT 250 FOR IP): Performed by: INTERNAL MEDICINE

## 2023-11-01 PROCEDURE — 81001 URINALYSIS AUTO W/SCOPE: CPT

## 2023-11-01 PROCEDURE — 6360000002 HC RX W HCPCS: Performed by: STUDENT IN AN ORGANIZED HEALTH CARE EDUCATION/TRAINING PROGRAM

## 2023-11-01 PROCEDURE — 6360000004 HC RX CONTRAST MEDICATION: Performed by: STUDENT IN AN ORGANIZED HEALTH CARE EDUCATION/TRAINING PROGRAM

## 2023-11-01 PROCEDURE — 82947 ASSAY GLUCOSE BLOOD QUANT: CPT

## 2023-11-01 PROCEDURE — 36415 COLL VENOUS BLD VENIPUNCTURE: CPT

## 2023-11-01 PROCEDURE — 83880 ASSAY OF NATRIURETIC PEPTIDE: CPT

## 2023-11-01 PROCEDURE — 84295 ASSAY OF SERUM SODIUM: CPT

## 2023-11-01 PROCEDURE — 93005 ELECTROCARDIOGRAM TRACING: CPT | Performed by: STUDENT IN AN ORGANIZED HEALTH CARE EDUCATION/TRAINING PROGRAM

## 2023-11-01 PROCEDURE — 84132 ASSAY OF SERUM POTASSIUM: CPT

## 2023-11-01 PROCEDURE — 84484 ASSAY OF TROPONIN QUANT: CPT

## 2023-11-01 PROCEDURE — 82330 ASSAY OF CALCIUM: CPT

## 2023-11-01 PROCEDURE — 71275 CT ANGIOGRAPHY CHEST: CPT

## 2023-11-01 RX ORDER — ACETAMINOPHEN 650 MG/1
650 SUPPOSITORY RECTAL EVERY 6 HOURS PRN
Status: DISCONTINUED | OUTPATIENT
Start: 2023-11-01 | End: 2023-11-04 | Stop reason: HOSPADM

## 2023-11-01 RX ORDER — SODIUM CHLORIDE 9 MG/ML
INJECTION, SOLUTION INTRAVENOUS PRN
Status: DISCONTINUED | OUTPATIENT
Start: 2023-11-01 | End: 2023-11-04 | Stop reason: HOSPADM

## 2023-11-01 RX ORDER — IPRATROPIUM BROMIDE AND ALBUTEROL SULFATE 2.5; .5 MG/3ML; MG/3ML
1 SOLUTION RESPIRATORY (INHALATION) EVERY 4 HOURS PRN
Status: DISCONTINUED | OUTPATIENT
Start: 2023-11-01 | End: 2023-11-04 | Stop reason: HOSPADM

## 2023-11-01 RX ORDER — BENZONATATE 100 MG/1
100 CAPSULE ORAL 3 TIMES DAILY PRN
Status: DISCONTINUED | OUTPATIENT
Start: 2023-11-01 | End: 2023-11-04 | Stop reason: HOSPADM

## 2023-11-01 RX ORDER — MAGNESIUM SULFATE IN WATER 40 MG/ML
2000 INJECTION, SOLUTION INTRAVENOUS PRN
Status: DISCONTINUED | OUTPATIENT
Start: 2023-11-01 | End: 2023-11-04 | Stop reason: HOSPADM

## 2023-11-01 RX ORDER — ACETAMINOPHEN 325 MG/1
650 TABLET ORAL EVERY 4 HOURS PRN
Status: COMPLETED | OUTPATIENT
Start: 2023-11-01 | End: 2023-11-02

## 2023-11-01 RX ORDER — FUROSEMIDE 10 MG/ML
40 INJECTION INTRAMUSCULAR; INTRAVENOUS ONCE
Status: DISCONTINUED | OUTPATIENT
Start: 2023-11-01 | End: 2023-11-01

## 2023-11-01 RX ORDER — BUTALBITAL, ACETAMINOPHEN AND CAFFEINE 50; 325; 40 MG/1; MG/1; MG/1
1 TABLET ORAL EVERY 6 HOURS PRN
Status: DISCONTINUED | OUTPATIENT
Start: 2023-11-01 | End: 2023-11-04 | Stop reason: HOSPADM

## 2023-11-01 RX ORDER — SODIUM CHLORIDE 0.9 % (FLUSH) 0.9 %
5-40 SYRINGE (ML) INJECTION PRN
Status: DISCONTINUED | OUTPATIENT
Start: 2023-11-01 | End: 2023-11-04 | Stop reason: HOSPADM

## 2023-11-01 RX ORDER — POTASSIUM CHLORIDE 20 MEQ/1
40 TABLET, EXTENDED RELEASE ORAL PRN
Status: DISCONTINUED | OUTPATIENT
Start: 2023-11-01 | End: 2023-11-04 | Stop reason: HOSPADM

## 2023-11-01 RX ORDER — BUMETANIDE 1 MG/1
2 TABLET ORAL 2 TIMES DAILY
Status: DISCONTINUED | OUTPATIENT
Start: 2023-11-01 | End: 2023-11-01

## 2023-11-01 RX ORDER — FUROSEMIDE 10 MG/ML
40 INJECTION INTRAMUSCULAR; INTRAVENOUS ONCE
Status: COMPLETED | OUTPATIENT
Start: 2023-11-01 | End: 2023-11-01

## 2023-11-01 RX ORDER — ONDANSETRON 2 MG/ML
4 INJECTION INTRAMUSCULAR; INTRAVENOUS EVERY 6 HOURS PRN
Status: DISCONTINUED | OUTPATIENT
Start: 2023-11-01 | End: 2023-11-04 | Stop reason: HOSPADM

## 2023-11-01 RX ORDER — SODIUM CHLORIDE 0.9 % (FLUSH) 0.9 %
5-40 SYRINGE (ML) INJECTION EVERY 12 HOURS SCHEDULED
Status: DISCONTINUED | OUTPATIENT
Start: 2023-11-01 | End: 2023-11-04 | Stop reason: HOSPADM

## 2023-11-01 RX ORDER — ENOXAPARIN SODIUM 100 MG/ML
30 INJECTION SUBCUTANEOUS 2 TIMES DAILY
Status: DISCONTINUED | OUTPATIENT
Start: 2023-11-01 | End: 2023-11-02

## 2023-11-01 RX ORDER — POLYETHYLENE GLYCOL 3350 17 G/17G
17 POWDER, FOR SOLUTION ORAL DAILY PRN
Status: DISCONTINUED | OUTPATIENT
Start: 2023-11-01 | End: 2023-11-04 | Stop reason: HOSPADM

## 2023-11-01 RX ORDER — ACETAMINOPHEN 325 MG/1
650 TABLET ORAL EVERY 6 HOURS PRN
Status: DISCONTINUED | OUTPATIENT
Start: 2023-11-01 | End: 2023-11-04 | Stop reason: HOSPADM

## 2023-11-01 RX ORDER — CLONIDINE HYDROCHLORIDE 0.1 MG/1
0.1 TABLET ORAL 2 TIMES DAILY
Status: DISCONTINUED | OUTPATIENT
Start: 2023-11-01 | End: 2023-11-03

## 2023-11-01 RX ORDER — ACETAMINOPHEN 325 MG/1
650 TABLET ORAL EVERY 4 HOURS PRN
Status: DISCONTINUED | OUTPATIENT
Start: 2023-11-01 | End: 2023-11-01

## 2023-11-01 RX ORDER — ERGOCALCIFEROL 1.25 MG/1
50000 CAPSULE ORAL WEEKLY
Status: DISCONTINUED | OUTPATIENT
Start: 2023-11-02 | End: 2023-11-04 | Stop reason: HOSPADM

## 2023-11-01 RX ORDER — MYCOPHENOLATE MOFETIL 500 MG/1
500 TABLET ORAL 2 TIMES DAILY
Status: ON HOLD | COMMUNITY
End: 2023-11-04 | Stop reason: SDUPTHER

## 2023-11-01 RX ORDER — POTASSIUM CHLORIDE 7.45 MG/ML
10 INJECTION INTRAVENOUS PRN
Status: DISCONTINUED | OUTPATIENT
Start: 2023-11-01 | End: 2023-11-04 | Stop reason: HOSPADM

## 2023-11-01 RX ORDER — BUMETANIDE 1 MG/1
2 TABLET ORAL 2 TIMES DAILY
Status: DISCONTINUED | OUTPATIENT
Start: 2023-11-02 | End: 2023-11-04 | Stop reason: HOSPADM

## 2023-11-01 RX ADMIN — CLONIDINE HYDROCHLORIDE 0.1 MG: 0.1 TABLET ORAL at 20:02

## 2023-11-01 RX ADMIN — SODIUM CHLORIDE, PRESERVATIVE FREE 10 ML: 5 INJECTION INTRAVENOUS at 20:03

## 2023-11-01 RX ADMIN — FUROSEMIDE 40 MG: 10 INJECTION, SOLUTION INTRAMUSCULAR; INTRAVENOUS at 08:15

## 2023-11-01 RX ADMIN — ACETAMINOPHEN 650 MG: 325 TABLET ORAL at 11:14

## 2023-11-01 RX ADMIN — AZITHROMYCIN MONOHYDRATE 500 MG: 500 INJECTION, POWDER, LYOPHILIZED, FOR SOLUTION INTRAVENOUS at 16:48

## 2023-11-01 RX ADMIN — ENOXAPARIN SODIUM 30 MG: 100 INJECTION SUBCUTANEOUS at 20:02

## 2023-11-01 RX ADMIN — RIVAROXABAN 10 MG: 10 TABLET, FILM COATED ORAL at 20:02

## 2023-11-01 RX ADMIN — IOPAMIDOL 100 ML: 755 INJECTION, SOLUTION INTRAVENOUS at 08:16

## 2023-11-01 RX ADMIN — HYDROCORTISONE SODIUM SUCCINATE 100 MG: 100 INJECTION, POWDER, FOR SOLUTION INTRAMUSCULAR; INTRAVENOUS at 15:44

## 2023-11-01 RX ADMIN — HYDROCORTISONE SODIUM SUCCINATE 100 MG: 100 INJECTION, POWDER, FOR SOLUTION INTRAMUSCULAR; INTRAVENOUS at 21:30

## 2023-11-01 RX ADMIN — SODIUM CHLORIDE 1000 MG: 900 INJECTION INTRAVENOUS at 15:44

## 2023-11-01 ASSESSMENT — PAIN DESCRIPTION - LOCATION: LOCATION: HEAD

## 2023-11-01 ASSESSMENT — PAIN SCALES - GENERAL: PAINLEVEL_OUTOF10: 2

## 2023-11-01 ASSESSMENT — PAIN DESCRIPTION - DESCRIPTORS: DESCRIPTORS: ACHING

## 2023-11-01 NOTE — ED NOTES
Ashley Cazares the following:    \"Pt c/o headache. Requested something for 3/10 pain. \"     Fidelia Lomas RN  11/01/23 1007

## 2023-11-01 NOTE — CONSULTS
Consultation Note    NAME: Braulio Grayson   :  2000   MRN:  [id number]     Date/Time:  2023 3:28 PM    I have been asked to see this patient by Dr. Maciel Cain  for advice/opinion re: Lupus nephritis. Assessment :    Plan:  Membranous GN (SLE)  PNA  Proteinuria  HTN, uncontrolled Hold MMF and CI for now, continue steroid     Creatinine ~1 - trend    Will add clonidine; if creatinine stable tomorrow, add back arb     From  visit with me:    membranous glomerulonephritis - Lupus membranous. on cellcept (initiated 2022) and Prednisone (initiated ). On Plaquenil. Increased MMF to 1500 mg po bid (). On pred 10. Continue to titrate down dose from my standpoint. On Lupkynis. creatinine up a tad, likely secondary to CI? Added ARB     systemic lupus erythematosus  following with Dr. Tarah Metz    thrombosis of renal vein    proteinuria   upc 1109    23 upc 5512    pulmonary embolism  following with Heme, Dr. Issac Trujillo and with Pulmonary, Dr. Doretha Zuluaga    edema of lower extremity  started bumex  (dose increased ) - now on 2 mg bid; low sodium diet; secondary to NRP    hydronephrosis  CT late 2023 3:02 PM EST  Left hydronephrosis and hydroureter with associated inflammatory change. Consider obstruction from a recently passed stone or pyelonephritis    re-image at some point    vitamin D deficiency  on ergo    Subjective:   CHIEF COMPLAINT:  sob    HISTORY OF PRESENT ILLNESS:     Gail Davis is a 24 y.o.   female who has a history of the below. Feeling poorly since Monday. Chart reviewed. Feeling better now.      Past Medical History:   Diagnosis Date    Anxiety and depression     History of blood clots     Lupus ([address])     Pneumonia 2023    and 2023      Past Surgical History:   Procedure Laterality Date    CT BIOPSY RENAL  2022    CT BIOPSY RENAL 2022 [facility] RAD CT    WISDOM TOOTH EXTRACTION       Social History     Tobacco Use

## 2023-11-01 NOTE — H&P
Hospitalist Admission Note    NAME:   Randell Jacob   : 2000   MRN: [id number]     Date/Time: 2023 2:24 PM    Patient PCP: DOMINICK Rivas NP    ______________________________________________________________________  Given the patient's current clinical presentation, I have a high level of concern for decompensation if discharged from the emergency department. Complex decision making was performed, which includes reviewing the patient's available past medical records, laboratory results, and x-ray films. My assessment of this patient's clinical condition and my plan of care is as follows. Assessment / Plan:  Acute hypoxic respiratory failure, SpO2 down to 80s on RA, improved on 4LNC  Sepsis, POA (tachycardia, fever , PNA)  PNA  CTA chest showed prominent bilateral patchy groundglass infiltrates. Consider atypical pneumo  No fluids given concerns for vol overload with elevated probnp  Start stress dose steroids (pt had been on prednisone outpt daily)  Empiric IV abx: rocephin/azithromycin  Tessalon perls prn  O2 supple, wean as tolerated    HTN  Hx of systemic Lupus and lupus Nephritis  Follows with Dr Radha Choi and Dr Tej Hester  Cr is at 1, baseline ~0.75  Holding off on plaquenil, cellcept and lupkynis  Stress dose steroids as above  Hold ARB for now. Consult Dr Tej Hester    Leg edema  Cont' bumex. Monitor daily cr    Vitamin D deficiency  on ergo    Hx of PE  On xarelto, follows with Dr Zelda Sanchez and Dr Vijaya Souza Making:  I personally reviewed labs  I personally reviewed imaging  Toxic drug monitoring  I personally reviewed EKG  Discussed case with: ED provider. After discussion I am in agreement that acuity of patient's medical condition necessitates hospital stay.       Code Status:   DVT Prophylaxis:   Baseline:     Subjective:   CHIEF COMPLAINT: sob  HISTORY OF PRESENT ILLNESS:     Randell Jacob is a 24 y.o.  female with PMHx significant for lupus/nephritis, Patent. PLEURA: Small bilateral pleural effusions. LUNGS: Bilateral patchy infiltrates UPPER ABDOMEN: Partially imaged. No acute pathology. BONES: No aggressive bone lesion or fracture. 1. No pulmonary emboli. 2. Prominent bilateral patchy groundglass infiltrates. Consider atypical pneumonia. Follow-up suggested. XR CHEST PORTABLE    Result Date: 11/1/2023  Clinical history: eval pna, pulm edema INDICATION:   eval pna, pulm edema COMPARISON: 8/12/2023 FINDINGS: AP portable upright view of the chest demonstrates an enlarged cardiopericardial silhouette. There is associated increased parenchymal and interstitial opacity. .There is no focal consolidation. .There is no pneumothorax. . Patient is on a cardiac monitor. Cardiomegaly and pulmonary edema.       _______________________________________________________________________    TOTAL TIME:  76 Minutes    Critical Care Provided     Minutes non procedure based    Signed: Carmel Young MD    Procedures: see electronic medical records for all procedures/Xrays and details which were not copied into this note but were reviewed prior to creation of Plan.

## 2023-11-01 NOTE — ED NOTES
Attempted to call report and was advised by FRANCES Hernandez to call back in 20 minutes as she is currently with a pt.      Bishop Shakir RN  11/01/23 2939

## 2023-11-01 NOTE — ED NOTES
Pt reports Romel Ser isn't working. Pt wet. Purewick removed. Pt assisted to bedside commode and linens changed. Pt was provided hospital underwear and pt had another short she changed into.       Jamie Cerna RN  11/01/23 3091

## 2023-11-01 NOTE — ED NOTES
2nd attempt to call report and advised FRANCES Tabor Mediate wrapping up which she will call back in about 5 mins.      Federica Galarza RN  11/01/23 7954

## 2023-11-02 LAB
ANION GAP SERPL CALC-SCNC: 7 MMOL/L (ref 5–15)
BASOPHILS # BLD: 0 K/UL (ref 0–0.1)
BASOPHILS NFR BLD: 0 % (ref 0–1)
BUN SERPL-MCNC: 12 MG/DL (ref 6–20)
BUN/CREAT SERPL: 13 (ref 12–20)
CALCIUM SERPL-MCNC: 8 MG/DL (ref 8.5–10.1)
CHLORIDE SERPL-SCNC: 112 MMOL/L (ref 97–108)
CO2 SERPL-SCNC: 23 MMOL/L (ref 21–32)
CREAT SERPL-MCNC: 0.94 MG/DL (ref 0.55–1.02)
DIFFERENTIAL METHOD BLD: ABNORMAL
EOSINOPHIL # BLD: 0 K/UL (ref 0–0.4)
EOSINOPHIL NFR BLD: 0 % (ref 0–7)
ERYTHROCYTE [DISTWIDTH] IN BLOOD BY AUTOMATED COUNT: 14 % (ref 11.5–14.5)
GLUCOSE SERPL-MCNC: 117 MG/DL (ref 65–100)
HCT VFR BLD AUTO: 23.8 % (ref 35–47)
HGB BLD-MCNC: 7.6 G/DL (ref 11.5–16)
IMM GRANULOCYTES # BLD AUTO: 0.1 K/UL (ref 0–0.04)
IMM GRANULOCYTES NFR BLD AUTO: 1 % (ref 0–0.5)
LYMPHOCYTES # BLD: 0.5 K/UL (ref 0.8–3.5)
LYMPHOCYTES NFR BLD: 8 % (ref 12–49)
MCH RBC QN AUTO: 27.9 PG (ref 26–34)
MCHC RBC AUTO-ENTMCNC: 31.9 G/DL (ref 30–36.5)
MCV RBC AUTO: 87.5 FL (ref 80–99)
MONOCYTES # BLD: 0.4 K/UL (ref 0–1)
MONOCYTES NFR BLD: 6 % (ref 5–13)
NEUTS SEG # BLD: 5.7 K/UL (ref 1.8–8)
NEUTS SEG NFR BLD: 85 % (ref 32–75)
NRBC # BLD: 0 K/UL (ref 0–0.01)
NRBC BLD-RTO: 0 PER 100 WBC
PLATELET # BLD AUTO: 224 K/UL (ref 150–400)
PMV BLD AUTO: 10.8 FL (ref 8.9–12.9)
POTASSIUM SERPL-SCNC: 4.1 MMOL/L (ref 3.5–5.1)
RBC # BLD AUTO: 2.72 M/UL (ref 3.8–5.2)
RBC MORPH BLD: ABNORMAL
SODIUM SERPL-SCNC: 142 MMOL/L (ref 136–145)
WBC # BLD AUTO: 6.7 K/UL (ref 3.6–11)

## 2023-11-02 PROCEDURE — 85025 COMPLETE CBC W/AUTO DIFF WBC: CPT

## 2023-11-02 PROCEDURE — 6370000000 HC RX 637 (ALT 250 FOR IP): Performed by: INTERNAL MEDICINE

## 2023-11-02 PROCEDURE — 36415 COLL VENOUS BLD VENIPUNCTURE: CPT

## 2023-11-02 PROCEDURE — 2060000000 HC ICU INTERMEDIATE R&B

## 2023-11-02 PROCEDURE — 2580000003 HC RX 258: Performed by: INTERNAL MEDICINE

## 2023-11-02 PROCEDURE — 6360000002 HC RX W HCPCS: Performed by: NURSE PRACTITIONER

## 2023-11-02 PROCEDURE — 80048 BASIC METABOLIC PNL TOTAL CA: CPT

## 2023-11-02 PROCEDURE — 6370000000 HC RX 637 (ALT 250 FOR IP): Performed by: STUDENT IN AN ORGANIZED HEALTH CARE EDUCATION/TRAINING PROGRAM

## 2023-11-02 PROCEDURE — 6360000002 HC RX W HCPCS: Performed by: INTERNAL MEDICINE

## 2023-11-02 PROCEDURE — 94760 N-INVAS EAR/PLS OXIMETRY 1: CPT

## 2023-11-02 PROCEDURE — 2700000000 HC OXYGEN THERAPY PER DAY

## 2023-11-02 RX ORDER — LOSARTAN POTASSIUM 50 MG/1
50 TABLET ORAL DAILY
Status: DISCONTINUED | OUTPATIENT
Start: 2023-11-02 | End: 2023-11-04 | Stop reason: HOSPADM

## 2023-11-02 RX ORDER — HYDRALAZINE HYDROCHLORIDE 20 MG/ML
10 INJECTION INTRAMUSCULAR; INTRAVENOUS EVERY 6 HOURS PRN
Status: DISCONTINUED | OUTPATIENT
Start: 2023-11-01 | End: 2023-11-04

## 2023-11-02 RX ADMIN — CLONIDINE HYDROCHLORIDE 0.1 MG: 0.1 TABLET ORAL at 09:42

## 2023-11-02 RX ADMIN — HYDROCORTISONE SODIUM SUCCINATE 100 MG: 100 INJECTION, POWDER, FOR SOLUTION INTRAMUSCULAR; INTRAVENOUS at 05:45

## 2023-11-02 RX ADMIN — ACETAMINOPHEN 650 MG: 325 TABLET ORAL at 19:36

## 2023-11-02 RX ADMIN — HYDROCORTISONE SODIUM SUCCINATE 100 MG: 100 INJECTION, POWDER, FOR SOLUTION INTRAMUSCULAR; INTRAVENOUS at 21:23

## 2023-11-02 RX ADMIN — HYDRALAZINE HYDROCHLORIDE 10 MG: 20 INJECTION, SOLUTION INTRAMUSCULAR; INTRAVENOUS at 02:55

## 2023-11-02 RX ADMIN — RIVAROXABAN 10 MG: 10 TABLET, FILM COATED ORAL at 18:33

## 2023-11-02 RX ADMIN — SODIUM CHLORIDE, PRESERVATIVE FREE 10 ML: 5 INJECTION INTRAVENOUS at 09:43

## 2023-11-02 RX ADMIN — HYDRALAZINE HYDROCHLORIDE 10 MG: 20 INJECTION, SOLUTION INTRAMUSCULAR; INTRAVENOUS at 11:54

## 2023-11-02 RX ADMIN — HYDROCORTISONE SODIUM SUCCINATE 100 MG: 100 INJECTION, POWDER, FOR SOLUTION INTRAMUSCULAR; INTRAVENOUS at 14:49

## 2023-11-02 RX ADMIN — ENOXAPARIN SODIUM 30 MG: 100 INJECTION SUBCUTANEOUS at 09:42

## 2023-11-02 RX ADMIN — BUMETANIDE 2 MG: 1 TABLET ORAL at 09:42

## 2023-11-02 RX ADMIN — LOSARTAN POTASSIUM 50 MG: 50 TABLET, FILM COATED ORAL at 09:42

## 2023-11-02 RX ADMIN — AZITHROMYCIN MONOHYDRATE 500 MG: 500 INJECTION, POWDER, LYOPHILIZED, FOR SOLUTION INTRAVENOUS at 15:33

## 2023-11-02 RX ADMIN — ERGOCALCIFEROL 50000 UNITS: 1.25 CAPSULE ORAL at 09:42

## 2023-11-02 RX ADMIN — SODIUM CHLORIDE 1000 MG: 900 INJECTION INTRAVENOUS at 14:49

## 2023-11-02 RX ADMIN — BUMETANIDE 2 MG: 1 TABLET ORAL at 20:05

## 2023-11-02 RX ADMIN — CLONIDINE HYDROCHLORIDE 0.1 MG: 0.1 TABLET ORAL at 20:05

## 2023-11-02 RX ADMIN — SODIUM CHLORIDE, PRESERVATIVE FREE 10 ML: 5 INJECTION INTRAVENOUS at 21:23

## 2023-11-02 ASSESSMENT — PAIN DESCRIPTION - LOCATION: LOCATION: HEAD

## 2023-11-02 ASSESSMENT — PAIN SCALES - GENERAL
PAINLEVEL_OUTOF10: 0
PAINLEVEL_OUTOF10: 3

## 2023-11-02 ASSESSMENT — PAIN DESCRIPTION - DESCRIPTORS: DESCRIPTORS: ACHING

## 2023-11-02 ASSESSMENT — PAIN DESCRIPTION - ORIENTATION: ORIENTATION: MID

## 2023-11-02 NOTE — PROGRESS NOTES
0720  Bedside shift report received. Alert. Oriented x 4. On 2 liters per nasal cannula. Sinus tachycardia. Respirations in 30s at start of shift. 22 at end of shift. HR down to 102 at end of shift. Patient able to remove oxygen cannula at one time she did not realize she had removed it when asleep and oxygen saturation did not drop at rest.  Oxygen saturation continues to drop with activity but patient rebounds when resting. 1925  End of Shift Note    Bedside shift change report given to Rory Moreno (oncoming nurse) by Radha Kraus RN (offgoing nurse). Report included the following information SBAR, Kardex, MAR, Recent Results, and Cardiac Rhythm sinus tachycardia    Shift worked:  7A-7P     Shift summary and any significant changes:     Patient down to 2 liters at rest and did remove at one point during this shift. Drops her oxygen saturation with activity but rebounds at rest.       Concerns for physician to address:  Discharge planning     Zone phone for oncoming shift:   [phone]       Activity:     Number times ambulated in hallways past shift: 0  Number of times OOB to chair past shift: 2    Cardiac:   Cardiac Monitoring: Yes           Access:  Current line(s): PIV     Genitourinary:   Urinary status: voiding    Respiratory:      Chronic home O2 use?: NO  Incentive spirometer at bedside: YES       GI:     Current diet:  DIET ONE TIME MESSAGE;  ADULT DIET;  Regular  Passing flatus: YES  Tolerating current diet: YES       Pain Management:   Patient states pain is manageable on current regimen: YES    Skin:     Interventions: increase time out of bed    Patient Safety:  Fall Score:    Interventions: bed/chair alarm and pt to call before getting OOB       Length of Stay:  Expected LOS: 3  Actual LOS: 1      Radha Kraus RN

## 2023-11-02 NOTE — PROGRESS NOTES
Pt does well on 3L NC while sitting in bed but when she ambulates O2 drops down into the 80's with increased SOB.

## 2023-11-02 NOTE — PROGRESS NOTES
NAME: Gina Faustin        :  2000        MRN:  [id number]                   Assessment   :                                               Plan:  Membranous GN (SLE)  PNA  Proteinuria  HTN, uncontrolled  H/o PE Hold MMF and CI for now, continue steroid     Restart CI and MMF on discharge (will send home on a decreased dose of MMF for now - 1000 mg PO BID. Creatinine ~1 - trend     Continue Clonidine; add back arb    Abx per Hospitalist       Subjective:     Chief Complaint:  oob in chair. Feeling better. No current complaint. We discussed the above. Review of Systems:    Symptom Y/N Comments  Symptom Y/N Comments   Fever/Chills    Chest Pain     Poor Appetite    Edema     Cough    Abdominal Pain     Sputum    Joint Pain     SOB/BALDERRAMA    Pruritis/Rash     Nausea/vomit    Tolerating PT/OT     Diarrhea    Tolerating Diet     Constipation    Other       Could not obtain due to:      Objective:     VITALS:   Last 24hrs VS reviewed since prior progress note. Most recent are:  Vitals:    23 0310   BP: (!) 146/63   Pulse: 91   Resp: (!) 51   Temp:    SpO2: 98%     No intake or output data in the 24 hours ending 23 0548   Telemetry Reviewed:     PHYSICAL EXAM:  General: NAD      Lab Data Reviewed: (see below)    Medications Reviewed: (see below)    PMH/SH reviewed - no change compared to H&P  ________________________________________________________________________  Care Plan discussed with:  Patient     Family      RN     Care Manager                    Consultant:          Comments   >50% of visit spent in counseling and coordination of care       ________________________________________________________________________  Linnea Medel MD     Procedures: see electronic medical records for all procedures/Xrays and details which  were not copied into this note but were reviewed prior to creation of Plan.       LABS:  Recent Labs

## 2023-11-02 NOTE — PROGRESS NOTES
1300  Admission assessment. Alert. Oriented x 4. Tachypneic. On 4 liters. Tachycardic. Patient states shortness of breath for 2 days. Has been taking less bumex than prescribed since running out. Increased lower extremity edema. 1733  Increased oxygen from 4 to 5 liters after ambulated to bathroom and back to bed. HR up to 130s but back down at rest.  Oxygen dropped into 80s with ambulation but back above 90% when back to bed.      1920  End of Shift Note    Bedside shift change report given to [employer]Tab (oncoming nurse) by Nuris Cruz RN (offgoing nurse). Report included the following information SBAR, ED Summary, MAR, Recent Results, and Cardiac Rhythm sinus tachycardia    Shift worked:  0934-2223     Shift summary and any significant changes:     Admitted from ED for shortness of breath x 2 days. Increased to 5 liters after decreased oxygen saturation with ambulation to bathroom. IV antibiotics given as ordered. Concerns for physician to address:  Remains on oxygen. BP elevated this shift but cuff on right lower arm. [facility]   [phone]       Activity:     Number times ambulated in hallways past shift: 0  Number of times OOB to chair past shift: 0    Cardiac:   Cardiac Monitoring: Yes           Access:  Current line(s): PIV     Genitourinary:   Urinary status: voiding    Respiratory:      Chronic home O2 use?: NO  Incentive spirometer at bedside: NO       GI:     Current diet:  DIET ONE TIME MESSAGE;  ADULT DIET;  Regular  Passing flatus: YES  Tolerating current diet: YES       Pain Management:   Patient states pain is manageable on current regimen: YES    Skin:     Interventions: increase time out of bed    Patient Safety:  Fall Score:    Interventions: bed/chair alarm and pt to call before getting OOB       Length of Stay:  Expected LOS: 3  Actual LOS: 0      Nuris Cruz RN

## 2023-11-03 ENCOUNTER — TELEPHONE (OUTPATIENT)
Age: 23
End: 2023-11-03

## 2023-11-03 LAB
ANION GAP SERPL CALC-SCNC: 8 MMOL/L (ref 5–15)
BASOPHILS # BLD: 0 K/UL (ref 0–0.1)
BASOPHILS NFR BLD: 0 % (ref 0–1)
BUN SERPL-MCNC: 17 MG/DL (ref 6–20)
BUN/CREAT SERPL: 15 (ref 12–20)
CALCIUM SERPL-MCNC: 8.1 MG/DL (ref 8.5–10.1)
CHLORIDE SERPL-SCNC: 111 MMOL/L (ref 97–108)
CO2 SERPL-SCNC: 21 MMOL/L (ref 21–32)
CREAT SERPL-MCNC: 1.11 MG/DL (ref 0.55–1.02)
DIFFERENTIAL METHOD BLD: ABNORMAL
EOSINOPHIL # BLD: 0 K/UL (ref 0–0.4)
EOSINOPHIL NFR BLD: 0 % (ref 0–7)
ERYTHROCYTE [DISTWIDTH] IN BLOOD BY AUTOMATED COUNT: 13.9 % (ref 11.5–14.5)
GLUCOSE SERPL-MCNC: 123 MG/DL (ref 65–100)
HCT VFR BLD AUTO: 24 % (ref 35–47)
HGB BLD-MCNC: 7.9 G/DL (ref 11.5–16)
IMM GRANULOCYTES # BLD AUTO: 0 K/UL (ref 0–0.04)
IMM GRANULOCYTES NFR BLD AUTO: 1 % (ref 0–0.5)
LYMPHOCYTES # BLD: 0.4 K/UL (ref 0.8–3.5)
LYMPHOCYTES NFR BLD: 9 % (ref 12–49)
MCH RBC QN AUTO: 28.5 PG (ref 26–34)
MCHC RBC AUTO-ENTMCNC: 32.9 G/DL (ref 30–36.5)
MCV RBC AUTO: 86.6 FL (ref 80–99)
MONOCYTES # BLD: 0.3 K/UL (ref 0–1)
MONOCYTES NFR BLD: 6 % (ref 5–13)
NEUTS SEG # BLD: 3.7 K/UL (ref 1.8–8)
NEUTS SEG NFR BLD: 85 % (ref 32–75)
NRBC # BLD: 0 K/UL (ref 0–0.01)
NRBC BLD-RTO: 0 PER 100 WBC
PLATELET # BLD AUTO: 233 K/UL (ref 150–400)
PMV BLD AUTO: 10.6 FL (ref 8.9–12.9)
POTASSIUM SERPL-SCNC: 3.7 MMOL/L (ref 3.5–5.1)
RBC # BLD AUTO: 2.77 M/UL (ref 3.8–5.2)
SODIUM SERPL-SCNC: 140 MMOL/L (ref 136–145)
WBC # BLD AUTO: 4.4 K/UL (ref 3.6–11)

## 2023-11-03 PROCEDURE — 2060000000 HC ICU INTERMEDIATE R&B

## 2023-11-03 PROCEDURE — 36415 COLL VENOUS BLD VENIPUNCTURE: CPT

## 2023-11-03 PROCEDURE — 6370000000 HC RX 637 (ALT 250 FOR IP): Performed by: INTERNAL MEDICINE

## 2023-11-03 PROCEDURE — 2580000003 HC RX 258: Performed by: INTERNAL MEDICINE

## 2023-11-03 PROCEDURE — 80048 BASIC METABOLIC PNL TOTAL CA: CPT

## 2023-11-03 PROCEDURE — 6360000002 HC RX W HCPCS: Performed by: INTERNAL MEDICINE

## 2023-11-03 PROCEDURE — 6360000002 HC RX W HCPCS: Performed by: NURSE PRACTITIONER

## 2023-11-03 PROCEDURE — 85025 COMPLETE CBC W/AUTO DIFF WBC: CPT

## 2023-11-03 RX ORDER — CLONIDINE HYDROCHLORIDE 0.1 MG/1
0.2 TABLET ORAL 2 TIMES DAILY
Status: DISCONTINUED | OUTPATIENT
Start: 2023-11-03 | End: 2023-11-04 | Stop reason: HOSPADM

## 2023-11-03 RX ORDER — PREDNISONE 5 MG/1
10 TABLET ORAL DAILY
Status: DISCONTINUED | OUTPATIENT
Start: 2023-11-04 | End: 2023-11-04 | Stop reason: HOSPADM

## 2023-11-03 RX ADMIN — AZITHROMYCIN MONOHYDRATE 500 MG: 500 INJECTION, POWDER, LYOPHILIZED, FOR SOLUTION INTRAVENOUS at 15:16

## 2023-11-03 RX ADMIN — SODIUM CHLORIDE, PRESERVATIVE FREE 10 ML: 5 INJECTION INTRAVENOUS at 08:27

## 2023-11-03 RX ADMIN — RIVAROXABAN 10 MG: 10 TABLET, FILM COATED ORAL at 18:26

## 2023-11-03 RX ADMIN — HYDRALAZINE HYDROCHLORIDE 10 MG: 20 INJECTION, SOLUTION INTRAMUSCULAR; INTRAVENOUS at 04:00

## 2023-11-03 RX ADMIN — CLONIDINE HYDROCHLORIDE 0.2 MG: 0.1 TABLET ORAL at 21:18

## 2023-11-03 RX ADMIN — SODIUM CHLORIDE, PRESERVATIVE FREE 10 ML: 5 INJECTION INTRAVENOUS at 21:19

## 2023-11-03 RX ADMIN — BUMETANIDE 2 MG: 1 TABLET ORAL at 08:26

## 2023-11-03 RX ADMIN — LOSARTAN POTASSIUM 50 MG: 50 TABLET, FILM COATED ORAL at 08:26

## 2023-11-03 RX ADMIN — HYDROCORTISONE SODIUM SUCCINATE 100 MG: 100 INJECTION, POWDER, FOR SOLUTION INTRAMUSCULAR; INTRAVENOUS at 06:02

## 2023-11-03 RX ADMIN — CLONIDINE HYDROCHLORIDE 0.2 MG: 0.1 TABLET ORAL at 08:27

## 2023-11-03 RX ADMIN — SODIUM CHLORIDE 1000 MG: 900 INJECTION INTRAVENOUS at 14:36

## 2023-11-03 RX ADMIN — BUMETANIDE 2 MG: 1 TABLET ORAL at 21:18

## 2023-11-03 RX ADMIN — HYDROCORTISONE SODIUM SUCCINATE 100 MG: 100 INJECTION, POWDER, FOR SOLUTION INTRAMUSCULAR; INTRAVENOUS at 16:36

## 2023-11-03 ASSESSMENT — PAIN SCALES - GENERAL: PAINLEVEL_OUTOF10: 0

## 2023-11-03 NOTE — PROGRESS NOTES
End of Shift Note    Bedside shift change report given to RN (oncoming nurse) by Laura Byers RN (offgoing nurse). Report included the following information SBAR, Kardex, MAR, and Recent Results    Shift worked:  7p-7a     Shift summary and any significant changes:     Pt slept most of the night. AM labs drawn. Prn tylenol given x1. Prn hydralazine given x1.      Concerns for physician to address:       Zone phone for oncoming shift:              Laura Byers RN

## 2023-11-03 NOTE — CARE COORDINATION
Care Management Initial Assessment       RUR:11%  Readmission? No  1st IM letter given? No  1st  letter given: No       11/03/23 1023   Service Assessment   Patient Orientation Alert and Oriented   Cognition Alert   History Provided By Patient   Primary Caregiver Self   Support Systems Spouse/Significant Other   Patient's Healthcare Decision Maker is: Patient Declined (Legal Next of Kin Remains as Decision Maker)   PCP Verified by CM Yes   Last Visit to PCP Within last 3 months   Prior Functional Level Independent in ADLs/IADLs   Current Functional Level Independent in ADLs/IADLs   Can patient return to prior living arrangement Yes   Ability to make needs known: Good   Family able to assist with home care needs: Yes   Financial Resources Medicaid   Social/Functional History   Lives With Significant other   Type of [address] Two [address]   Home Access   (15 steps)   ADL Assistance Independent   Homemaking Assistance Independent   Ambulation Assistance Independent   Transfer Assistance Independent   Discharge Planning   Type of [address] Prior To Admission [address]   DME Ordered? No   Potential Assistance Purchasing Medications No   Type of Home Care Services None   Patient expects to be discharged to: [facility]   Transition of Care Consult (CM Consult) N/A   [employer] Information Provided? No   Mode of Transport at Discharge Other (see comment)   Confirm Follow Up Transport Family   Condition of Participation: Discharge Planning   The Plan for Transition of Care is related to the following treatment goals: follow up PCP and specialist, incentive spirometry     CM met with patient to discuss DC needs.  Possible DC today  Boyfriend will be transport home  No identified DC needs from JEFF Durant RN, CM

## 2023-11-03 NOTE — PLAN OF CARE
Problem: Discharge Planning  Goal: Discharge to home or other facility with appropriate resources  Outcome: Progressing  Flowsheets (Taken 11/2/2023 0800 by Courtney Patel RN)  Discharge to home or other facility with appropriate resources: Identify barriers to discharge with patient and caregiver     Problem: Safety - Adult  Goal: Free from fall injury  Outcome: Progressing  Flowsheets (Taken 11/2/2023 0800 by Courtney Patel RN)  Free From Fall Injury: Instruct family/caregiver on patient safety     Problem: Pain  Goal: Verbalizes/displays adequate comfort level or baseline comfort level  Outcome: Progressing     Problem: Respiratory - Adult  Goal: Achieves optimal ventilation and oxygenation  Outcome: Progressing     Problem: Cardiovascular - Adult  Goal: Maintains optimal cardiac output and hemodynamic stability  Outcome: Progressing  Goal: Absence of cardiac dysrhythmias or at baseline  Outcome: Progressing     Problem: Gastrointestinal - Adult  Goal: Minimal or absence of nausea and vomiting  Outcome: Progressing  Goal: Maintains or returns to baseline bowel function  Outcome: Progressing  Goal: Maintains adequate nutritional intake  Outcome: Progressing  Goal: Establish and maintain optimal ostomy function  Outcome: Progressing     Problem: Hematologic - Adult  Goal: Maintains hematologic stability  Outcome: Progressing

## 2023-11-03 NOTE — TELEPHONE ENCOUNTER
Called patient and she is not being discharged now due to elevated BP. Patient will call on Monday when she is hopefully home and we will complete her ENRICO at that time.

## 2023-11-03 NOTE — PROGRESS NOTES
NAME: Lisseth Schilling        :  2000        MRN:  [id number]                   Assessment   :                                               Plan:  Membranous GN (SLE)  PNA  Proteinuria, NRP   HTN, uncontrolled  H/o PE Holding MMF and CI for now, continue steroid     Restart CI and MMF on discharge (will send home on a decreased dose of MMF for now - 1500 to 1000 mg PO BID. Creatinine ~1 - trend     Continue Clonidine (increase dose), bumex 2 mg po bid and losartan    Abx per Hospitalist       Subjective:     Chief Complaint:  oob in chair. Feeling better. No current complaint. On room air. We discussed the above. Review of Systems:    Symptom Y/N Comments  Symptom Y/N Comments   Fever/Chills    Chest Pain     Poor Appetite    Edema     Cough    Abdominal Pain     Sputum    Joint Pain     SOB/BALDERRAMA    Pruritis/Rash     Nausea/vomit    Tolerating PT/OT     Diarrhea    Tolerating Diet     Constipation    Other       Could not obtain due to:      Objective:     VITALS:   Last 24hrs VS reviewed since prior progress note.  Most recent are:  Vitals:    23 0400   BP: (!) 159/92   Pulse:    Resp:    Temp:    SpO2:        Intake/Output Summary (Last 24 hours) at 11/3/2023 0544  Last data filed at 2023 0943  Gross per 24 hour   Intake 5 ml   Output --   Net 5 ml      Telemetry Reviewed:     PHYSICAL EXAM:  General: NAD      Lab Data Reviewed: (see below)    Medications Reviewed: (see below)    PMH/SH reviewed - no change compared to H&P  ________________________________________________________________________  Care Plan discussed with:  Patient     Family      RN     Care Manager                    Consultant:          Comments   >50% of visit spent in counseling and coordination of care       ________________________________________________________________________  Rosemarie Sweeney MD     Procedures: see electronic medical records for

## 2023-11-04 VITALS
HEART RATE: 88 BPM | RESPIRATION RATE: 22 BRPM | DIASTOLIC BLOOD PRESSURE: 96 MMHG | OXYGEN SATURATION: 97 % | BODY MASS INDEX: 50.02 KG/M2 | WEIGHT: 293 LBS | SYSTOLIC BLOOD PRESSURE: 158 MMHG | TEMPERATURE: 98 F | HEIGHT: 64 IN

## 2023-11-04 LAB
ANION GAP SERPL CALC-SCNC: 6 MMOL/L (ref 5–15)
BASOPHILS # BLD: 0 K/UL (ref 0–0.1)
BASOPHILS NFR BLD: 0 % (ref 0–1)
BUN SERPL-MCNC: 20 MG/DL (ref 6–20)
BUN/CREAT SERPL: 19 (ref 12–20)
CALCIUM SERPL-MCNC: 8.2 MG/DL (ref 8.5–10.1)
CHLORIDE SERPL-SCNC: 112 MMOL/L (ref 97–108)
CO2 SERPL-SCNC: 23 MMOL/L (ref 21–32)
CREAT SERPL-MCNC: 1.08 MG/DL (ref 0.55–1.02)
DIFFERENTIAL METHOD BLD: ABNORMAL
EOSINOPHIL # BLD: 0 K/UL (ref 0–0.4)
EOSINOPHIL NFR BLD: 0 % (ref 0–7)
ERYTHROCYTE [DISTWIDTH] IN BLOOD BY AUTOMATED COUNT: 13.8 % (ref 11.5–14.5)
GLUCOSE SERPL-MCNC: 99 MG/DL (ref 65–100)
HCT VFR BLD AUTO: 24.5 % (ref 35–47)
HGB BLD-MCNC: 7.9 G/DL (ref 11.5–16)
IMM GRANULOCYTES # BLD AUTO: 0.1 K/UL (ref 0–0.04)
IMM GRANULOCYTES NFR BLD AUTO: 1 % (ref 0–0.5)
LYMPHOCYTES # BLD: 0.9 K/UL (ref 0.8–3.5)
LYMPHOCYTES NFR BLD: 17 % (ref 12–49)
MCH RBC QN AUTO: 28.6 PG (ref 26–34)
MCHC RBC AUTO-ENTMCNC: 32.2 G/DL (ref 30–36.5)
MCV RBC AUTO: 88.8 FL (ref 80–99)
MONOCYTES # BLD: 0.4 K/UL (ref 0–1)
MONOCYTES NFR BLD: 8 % (ref 5–13)
NEUTS SEG # BLD: 3.7 K/UL (ref 1.8–8)
NEUTS SEG NFR BLD: 74 % (ref 32–75)
NRBC # BLD: 0 K/UL (ref 0–0.01)
NRBC BLD-RTO: 0 PER 100 WBC
PLATELET # BLD AUTO: 249 K/UL (ref 150–400)
PMV BLD AUTO: 10.6 FL (ref 8.9–12.9)
POTASSIUM SERPL-SCNC: 3.6 MMOL/L (ref 3.5–5.1)
RBC # BLD AUTO: 2.76 M/UL (ref 3.8–5.2)
SODIUM SERPL-SCNC: 141 MMOL/L (ref 136–145)
WBC # BLD AUTO: 5 K/UL (ref 3.6–11)

## 2023-11-04 PROCEDURE — 6370000000 HC RX 637 (ALT 250 FOR IP): Performed by: INTERNAL MEDICINE

## 2023-11-04 PROCEDURE — 80048 BASIC METABOLIC PNL TOTAL CA: CPT

## 2023-11-04 PROCEDURE — 2580000003 HC RX 258: Performed by: INTERNAL MEDICINE

## 2023-11-04 PROCEDURE — 36415 COLL VENOUS BLD VENIPUNCTURE: CPT

## 2023-11-04 PROCEDURE — 85025 COMPLETE CBC W/AUTO DIFF WBC: CPT

## 2023-11-04 PROCEDURE — 6360000002 HC RX W HCPCS: Performed by: NURSE PRACTITIONER

## 2023-11-04 RX ORDER — LEVOFLOXACIN 750 MG/1
750 TABLET ORAL DAILY
Status: DISCONTINUED | OUTPATIENT
Start: 2023-11-04 | End: 2023-11-04 | Stop reason: HOSPADM

## 2023-11-04 RX ORDER — HYDRALAZINE HYDROCHLORIDE 20 MG/ML
20 INJECTION INTRAMUSCULAR; INTRAVENOUS EVERY 6 HOURS PRN
Status: DISCONTINUED | OUTPATIENT
Start: 2023-11-04 | End: 2023-11-04 | Stop reason: HOSPADM

## 2023-11-04 RX ORDER — CLONIDINE HYDROCHLORIDE 0.2 MG/1
0.2 TABLET ORAL 2 TIMES DAILY
Qty: 60 TABLET | Refills: 0 | Status: SHIPPED | OUTPATIENT
Start: 2023-11-04

## 2023-11-04 RX ORDER — POTASSIUM CHLORIDE 20 MEQ/1
40 TABLET, EXTENDED RELEASE ORAL ONCE
Status: COMPLETED | OUTPATIENT
Start: 2023-11-04 | End: 2023-11-04

## 2023-11-04 RX ORDER — MYCOPHENOLATE MOFETIL 500 MG/1
1000 TABLET ORAL 2 TIMES DAILY
Qty: 60 TABLET | Refills: 3 | Status: SHIPPED
Start: 2023-11-04

## 2023-11-04 RX ORDER — BUMETANIDE 1 MG/1
2 TABLET ORAL 2 TIMES DAILY
Qty: 30 TABLET | Refills: 0 | Status: SHIPPED | OUTPATIENT
Start: 2023-11-04 | End: 2023-11-05 | Stop reason: SDUPTHER

## 2023-11-04 RX ORDER — LEVOFLOXACIN 750 MG/1
750 TABLET ORAL DAILY
Qty: 1 TABLET | Refills: 0 | Status: SHIPPED | OUTPATIENT
Start: 2023-11-04 | End: 2023-11-05

## 2023-11-04 RX ADMIN — LEVOFLOXACIN 750 MG: 750 TABLET, FILM COATED ORAL at 12:27

## 2023-11-04 RX ADMIN — PREDNISONE 10 MG: 5 TABLET ORAL at 07:56

## 2023-11-04 RX ADMIN — BUMETANIDE 2 MG: 1 TABLET ORAL at 07:56

## 2023-11-04 RX ADMIN — SODIUM CHLORIDE, PRESERVATIVE FREE 10 ML: 5 INJECTION INTRAVENOUS at 07:57

## 2023-11-04 RX ADMIN — CLONIDINE HYDROCHLORIDE 0.2 MG: 0.1 TABLET ORAL at 07:56

## 2023-11-04 RX ADMIN — POTASSIUM CHLORIDE 40 MEQ: 1500 TABLET, EXTENDED RELEASE ORAL at 12:27

## 2023-11-04 RX ADMIN — LOSARTAN POTASSIUM 50 MG: 50 TABLET, FILM COATED ORAL at 07:56

## 2023-11-04 RX ADMIN — HYDRALAZINE HYDROCHLORIDE 10 MG: 20 INJECTION, SOLUTION INTRAMUSCULAR; INTRAVENOUS at 03:53

## 2023-11-04 ASSESSMENT — PAIN SCALES - GENERAL: PAINLEVEL_OUTOF10: 0

## 2023-11-04 NOTE — CARE COORDINATION
No further CM needs identified at this time. Transition of Care Plan:    RUR: 10% \"low risk\"  Prior Level of Functioning: independent with ADL'S  Disposition: Home with follow up appts  Follow up appointments: Pt will follow up with PCP appointments  DME needed: None  Transportation at discharge: Boyfriend will provide transport at d/c  IM/IMM Medicare/ letter given: No IM letter needed at d/c  Is patient a  and connected with VA? N/A  Caregiver Contact: Pts Boyfriend Adrianna Goel [phone]  Discharge Caregiver contacted prior to discharge? No  Care Conference needed? Not at this time  Barriers to discharge:  None    Initial note: Chart reviewed for updates. Pt has a discharge order. No PT/OT needs identified. No CM needs identified at this time. No IM letter needed at this time. Pt's boyfriend will provide transport at d/c. No further CM needs identified at this time.       11/04/23 [address] [place] [address] Discharge   Transition of Care Consult (CM Consult) Discharge Planning   Services At/After Discharge None   Mode of Transport at Discharge Other (see comment)  (Boyfriend will provide transport at d/c)   Confirm Follow Up Transport Family  (Boyfriend will provide transport at d/c)   Condition of Participation: Discharge Planning   The Plan for Transition of Care is related to the following treatment goals: follow up PCP and specialist,     JENNY Nugent    [phone]

## 2023-11-04 NOTE — PROGRESS NOTES
NAME: Shelbie Garcia        :  2000        MRN:  [id number]                   Assessment   :                                               Plan:  Membranous GN (SLE)  PNA  Proteinuria, NRP   HTN, uncontrolled  H/o PE Holding MMF and CI for now, continue steroid     Restart CI and MMF on discharge (will send home on a decreased dose of MMF for now - 1500 to 1000 mg PO BID. Creatinine ~1 - trend    BP elevated-> steroids likely contributing. Continue Clonidine (increase dose), bumex 2 mg po bid and losartan  Increase PRN IV Hydralazine    Abx per Hospitalist    Hgb low but stable. Stable for discharge from renal standpoint       Subjective:     Chief Complaint:  sitting up in the bed. On room air. We discussed the above. Review of Systems:    Symptom Y/N Comments  Symptom Y/N Comments   Fever/Chills    Chest Pain     Poor Appetite    Edema     Cough    Abdominal Pain     Sputum    Joint Pain     SOB/BALDERRAMA    Pruritis/Rash     Nausea/vomit    Tolerating PT/OT     Diarrhea    Tolerating Diet     Constipation    Other       Could not obtain due to:      Objective:     VITALS:   Last 24hrs VS reviewed since prior progress note.  Most recent are:  Vitals:    23 0922   BP: (!) 150/95   Pulse: 91   Resp: (!) 33   Temp:    SpO2:      No intake or output data in the 24 hours ending 23 1147     Telemetry Reviewed:     PHYSICAL EXAM:  General: NAD/Obese      Lab Data Reviewed: (see below)    Medications Reviewed: (see below)    PMH/ reviewed - no change compared to H&P  ________________________________________________________________________  Care Plan discussed with:  Patient Y    Family      RN     Care Manager                    Consultant:          Comments   >50% of visit spent in counseling and coordination of care       ________________________________________________________________________  Alyssa Tiexeira MD Procedures: see electronic medical records for all procedures/Xrays and details which  were not copied into this note but were reviewed prior to creation of Plan. LABS:  Recent Labs     11/03/23 0057 11/04/23 0347   WBC 4.4 5.0   HGB 7.9* 7.9*   HCT 24.0* 24.5*    249       Recent Labs     11/02/23  0213 11/03/23 0057 11/04/23 0347    140 141   K 4.1 3.7 3.6   * 111* 112*   CO2 23 21 23   BUN 12 17 20       No results for input(s): \"TP\", \"ALB\", \"GLOB\", \"GGT\", \"AML\" in the last 72 hours. Invalid input(s): \"SGOT\", \"GPT\", \"AP\", \"TBIL\", \"AMYP\", \"LPSE\", \"HLPSE\"    No results for input(s): \"INR\", \"APTT\" in the last 72 hours. Invalid input(s): \"PTP\"   No results for input(s): \"TIBC\", \"FERR\" in the last 72 hours. Invalid input(s): \"FE\", \"PSAT\"   No results found for: \"FOL\", \"RBCF\"   No results for input(s): \"PH\", \"PCO2\", \"PO2\" in the last 72 hours. No results for input(s): \"CPK\", \"CKMB\", \"TROPONINI\" in the last 72 hours.   No components found for: \"GLPOC\"  @labua@    MEDICATIONS:  Current Facility-Administered Medications   Medication Dose Route Frequency    potassium chloride (KLOR-CON M) extended release tablet 40 mEq  40 mEq Oral Once    hydrALAZINE (APRESOLINE) injection 20 mg  20 mg IntraVENous Q6H PRN    cloNIDine (CATAPRES) tablet 0.2 mg  0.2 mg Oral BID    predniSONE (DELTASONE) tablet 10 mg  10 mg Oral Daily    losartan (COZAAR) tablet 50 mg  50 mg Oral Daily    butalbital-acetaminophen-caffeine (FIORICET, ESGIC) per tablet 1 tablet  1 tablet Oral Q6H PRN    ondansetron (ZOFRAN) injection 4 mg  4 mg IntraVENous Q6H PRN    cefTRIAXone (ROCEPHIN) 1,000 mg in sodium chloride 0.9 % 50 mL IVPB (mini-bag)  1,000 mg IntraVENous Q24H    azithromycin (ZITHROMAX) 500 mg in sodium chloride 0.9 % 250 mL IVPB (Uswy1Evm)  500 mg IntraVENous Q24H    sodium chloride flush 0.9 % injection 5-40 mL  5-40 mL IntraVENous 2 times per day    sodium chloride flush 0.9 % injection 5-40 mL  5-40 mL

## 2023-11-04 NOTE — DISCHARGE SUMMARY
Discharge Summary    Name: Magda Rockwell  [id number]  YOB: 2000 (Age: 24 y.o.)   Date of Admission: 11/1/2023  Date of Discharge: 11/4/2023  Attending Physician: Henry Michael MD    Discharge Diagnosis:   Acute hypoxic respiratory failure, SpO2 down to 80s on RA, improved on 4LNC  Sepsis, POA (tachycardia, fever , PNA)  PNA  HTN, uncontrolled  Hx of systemic Lupus and lupus Nephritis  Leg edema  Vitamin D deficiency   Hx of PE    Consultations:  IP CONSULT TO HOSPITALIST  IP CONSULT TO NEPHROLOGY      Brief Admission History/Reason for Admission Per Henry Michael MD:   Magda Rockwell is a 24 y.o.  female with PMHx significant for lupus/nephritis, recurrent PNA, hx of PE, presents to the ER for evaluation of SOB. Symptoms started Monday when pt felt \"slow\". Yesterday, she felt worst with sob with exertion and at rest.  Pt went to bed last night and realized she was sob and unable to sleep. She woke up this AM unable to breathe so she came to the ER for evaluation. Pt states she almost ran out of her bumex due to delay in meds arriving to her home. She had been spacing her bumex out to take 1mg daily  rather than 2mg BID so it could last her another week before new medications arrive. She denies fever, cough, cp, n/v/d. In the ER, pt was febrile  Tm 101. 1. RR 41, P 119, //109. Pt was placed on 4LNC. We were asked to admit for work up and evaluation of the above problems. Brief Hospital Course by Main Problems:   Acute hypoxic respiratory failure, SpO2 down to 80s on RA, improved on 4LNC  Sepsis, POA (tachycardia, fever , PNA)  PNA  CTA chest showed prominent bilateral patchy groundglass infiltrates. Consider atypical pneumo  No fluids given on admission given concerns for vol overload with elevated probnp. Completed stress dose steroids. Home dose prednisone is resumed. Pt received IV rocephin/azithromycin, transitioned to PO levaquin at discharge.

## 2023-11-05 RX ORDER — BUMETANIDE 1 MG/1
2 TABLET ORAL 2 TIMES DAILY
Qty: 28 TABLET | Refills: 0 | Status: SHIPPED | OUTPATIENT
Start: 2023-11-05 | End: 2023-11-12

## 2023-11-06 ENCOUNTER — TELEPHONE (OUTPATIENT)
Age: 23
End: 2023-11-06

## 2023-11-06 NOTE — TELEPHONE ENCOUNTER
Patient needs ENRICO call. Patient d/c on 11/4/23 and has appt scheduled for 11/7/23. Please call patient to complete ENRICO.

## 2023-11-06 NOTE — TELEPHONE ENCOUNTER
Care Transitions Initial Follow Up Call    Outreach made within 2 business days of discharge: Yes    Patient: Camron Allison Patient : 2000   MRN: [id number]  Reason for Admission: There are no discharge diagnoses documented for the most recent discharge. Discharge Date: 23       Spoke with: patient    Discharge department/facility: [address] [place] [address] Interactive Patient Contact:  Was patient able to fill all prescriptions: yes  Was patient instructed to bring all medications to the follow-up visit: No:   Is patient taking all medications as directed in the discharge summary?  yes  Does patient understand their discharge instructions: Yes  Does patient have questions or concerns that need addressed prior to 7-14 day follow up office visit: no    Scheduled appointment with PCP within 7-14 days    Follow Up  Future Appointments   Date Time Provider [address] [place] [address]   2023  1:00 PM DOMINICK Harmon - NP [facility] BS AMB   3/28/2024  8:30 AM Jurgen Tuttle MD RDE [facility] [address] BS AMB       Varun Lord RN

## 2023-11-07 ENCOUNTER — TELEPHONE (OUTPATIENT)
Age: 23
End: 2023-11-07

## 2023-11-07 ENCOUNTER — OFFICE VISIT (OUTPATIENT)
Age: 23
End: 2023-11-07

## 2023-11-07 VITALS
RESPIRATION RATE: 20 BRPM | BODY MASS INDEX: 50.02 KG/M2 | HEIGHT: 64 IN | DIASTOLIC BLOOD PRESSURE: 88 MMHG | SYSTOLIC BLOOD PRESSURE: 124 MMHG | WEIGHT: 293 LBS | HEART RATE: 92 BPM | TEMPERATURE: 97.1 F | OXYGEN SATURATION: 99 %

## 2023-11-07 DIAGNOSIS — E66.01 CLASS 3 SEVERE OBESITY DUE TO EXCESS CALORIES WITH SERIOUS COMORBIDITY AND BODY MASS INDEX (BMI) OF 50.0 TO 59.9 IN ADULT (HCC): ICD-10-CM

## 2023-11-07 DIAGNOSIS — D50.0 IRON DEFICIENCY ANEMIA SECONDARY TO BLOOD LOSS (CHRONIC): ICD-10-CM

## 2023-11-07 DIAGNOSIS — Z09 HOSPITAL DISCHARGE FOLLOW-UP: Primary | ICD-10-CM

## 2023-11-07 DIAGNOSIS — I15.2 HYPERTENSION DUE TO ENDOCRINE DISORDER: ICD-10-CM

## 2023-11-07 DIAGNOSIS — E03.9 ACQUIRED HYPOTHYROIDISM: ICD-10-CM

## 2023-11-07 DIAGNOSIS — D84.9 IMMUNOSUPPRESSED STATUS (HCC): ICD-10-CM

## 2023-11-07 DIAGNOSIS — M32.14 SLE GLOMERULONEPHRITIS SYNDROME (HCC): ICD-10-CM

## 2023-11-07 ASSESSMENT — PATIENT HEALTH QUESTIONNAIRE - PHQ9
SUM OF ALL RESPONSES TO PHQ QUESTIONS 1-9: 0
2. FEELING DOWN, DEPRESSED OR HOPELESS: 0
SUM OF ALL RESPONSES TO PHQ QUESTIONS 1-9: 0
1. LITTLE INTEREST OR PLEASURE IN DOING THINGS: 0
SUM OF ALL RESPONSES TO PHQ QUESTIONS 1-9: 0
SUM OF ALL RESPONSES TO PHQ9 QUESTIONS 1 & 2: 0
SUM OF ALL RESPONSES TO PHQ QUESTIONS 1-9: 0

## 2023-11-07 NOTE — TELEPHONE ENCOUNTER
ERIC ALEXANDRE left, I forgot to hand her work paper Meenakshi had done. It can be faxed or picked up, and do apologize for the error.

## 2023-11-07 NOTE — PROGRESS NOTES
Post-Discharge Transitional Care  Follow Up      Fuad España   YOB: 2000    Date of Office Visit:  11/7/2023  Date of Hospital Admission: 11/1/23  Date of Hospital Discharge: 11/4/23  Risk of hospital readmission (high >=14%. Medium >=10%) :Readmission Risk Score: 10.1      Care management risk score Rising risk (score 2-5) and Complex Care (Scores >=6): No Risk Score On File     Non face to face  following discharge, date last encounter closed (first attempt may have been earlier): 11/06/2023    Call initiated 2 business days of discharge: Yes    ASSESSMENT/PLAN:   Hospital discharge follow-up  -     ID DISCHARGE MEDS RECONCILED W/ CURRENT OUTPATIENT MED LIST  Iron deficiency anemia secondary to blood loss (chronic)  -     ID COLLECTION VENOUS BLOOD VENIPUNCTURE  -     CBC with Auto Differential; Future  -     Basic Metabolic Panel; Future  SLE glomerulonephritis syndrome (HCC)  -     ID COLLECTION VENOUS BLOOD VENIPUNCTURE  -     CBC with Auto Differential; Future  -     Basic Metabolic Panel; Future  Hypertension due to endocrine disorder  -     ID COLLECTION VENOUS BLOOD VENIPUNCTURE  -     CBC with Auto Differential; Future  -     Basic Metabolic Panel; Future  Acquired hypothyroidism  -     T4  -     T4, Free  -     TSH  Immunosuppressed status (HCC)  Class 3 severe obesity due to excess calories with serious comorbidity and body mass index (BMI) of 50.0 to 59.9 in [facility])    Normotensive today in office; continue current regimen without changes. Medical Decision Making: moderate complexity  Return in about 3 months (around 2/7/2024). On this date 11/7/2023 I have spent 30 minutes reviewing previous notes, test results and face to face with the patient discussing the diagnosis and importance of compliance with the treatment plan as well as documenting on the day of the visit.        Subjective:   HPI:  Follow up of Hospital problems/diagnosis(es): PNA, anemia, HTN    Inpatient

## 2023-11-07 NOTE — PROGRESS NOTES
Labs drawn in Lt  arm unsuccessfully, vein collapsed, difficult stick, Rt hand successful draw per Meenakshi's orders. Patient tolerated well, 2 SST and 1 lavender.

## 2023-11-08 LAB
ANION GAP SERPL CALC-SCNC: 7 MMOL/L (ref 5–15)
BASOPHILS # BLD: 0 K/UL (ref 0–0.1)
BASOPHILS NFR BLD: 0 % (ref 0–1)
BUN SERPL-MCNC: 16 MG/DL (ref 6–20)
BUN/CREAT SERPL: 14 (ref 12–20)
CALCIUM SERPL-MCNC: 8.1 MG/DL (ref 8.5–10.1)
CHLORIDE SERPL-SCNC: 105 MMOL/L (ref 97–108)
CO2 SERPL-SCNC: 28 MMOL/L (ref 21–32)
COMMENT:: NORMAL
CREAT SERPL-MCNC: 1.11 MG/DL (ref 0.55–1.02)
DIFFERENTIAL METHOD BLD: ABNORMAL
EOSINOPHIL # BLD: 0 K/UL (ref 0–0.4)
EOSINOPHIL NFR BLD: 0 % (ref 0–7)
ERYTHROCYTE [DISTWIDTH] IN BLOOD BY AUTOMATED COUNT: 13.7 % (ref 11.5–14.5)
GLUCOSE SERPL-MCNC: 102 MG/DL (ref 65–100)
HCT VFR BLD AUTO: 26.2 % (ref 35–47)
HGB BLD-MCNC: 7.9 G/DL (ref 11.5–16)
IMM GRANULOCYTES # BLD AUTO: 0.1 K/UL (ref 0–0.04)
IMM GRANULOCYTES NFR BLD AUTO: 1 % (ref 0–0.5)
LYMPHOCYTES # BLD: 0.5 K/UL (ref 0.8–3.5)
LYMPHOCYTES NFR BLD: 10 % (ref 12–49)
MCH RBC QN AUTO: 27.6 PG (ref 26–34)
MCHC RBC AUTO-ENTMCNC: 30.2 G/DL (ref 30–36.5)
MCV RBC AUTO: 91.6 FL (ref 80–99)
MONOCYTES # BLD: 0.5 K/UL (ref 0–1)
MONOCYTES NFR BLD: 10 % (ref 5–13)
NEUTS SEG # BLD: 3.9 K/UL (ref 1.8–8)
NEUTS SEG NFR BLD: 79 % (ref 32–75)
NRBC # BLD: 0 K/UL (ref 0–0.01)
NRBC BLD-RTO: 0 PER 100 WBC
PLATELET # BLD AUTO: 276 K/UL (ref 150–400)
PMV BLD AUTO: 11.2 FL (ref 8.9–12.9)
POTASSIUM SERPL-SCNC: 3.5 MMOL/L (ref 3.5–5.1)
RBC # BLD AUTO: 2.86 M/UL (ref 3.8–5.2)
RBC MORPH BLD: ABNORMAL
RBC MORPH BLD: ABNORMAL
SODIUM SERPL-SCNC: 140 MMOL/L (ref 136–145)
SPECIMEN HOLD: NORMAL
WBC # BLD AUTO: 5 K/UL (ref 3.6–11)

## 2023-12-26 DIAGNOSIS — E55.9 VITAMIN D DEFICIENCY: ICD-10-CM

## 2023-12-27 RX ORDER — ERGOCALCIFEROL 1.25 MG/1
50000 CAPSULE ORAL WEEKLY
Qty: 12 CAPSULE | Refills: 1 | Status: SHIPPED | OUTPATIENT
Start: 2023-12-27

## 2024-01-11 ENCOUNTER — TELEPHONE (OUTPATIENT)
Age: 24
End: 2024-01-11

## 2024-01-11 DIAGNOSIS — E55.9 VITAMIN D DEFICIENCY: ICD-10-CM

## 2024-01-12 RX ORDER — ERGOCALCIFEROL 1.25 MG/1
50000 CAPSULE ORAL WEEKLY
Qty: 12 CAPSULE | Refills: 1 | OUTPATIENT
Start: 2024-01-12

## 2024-02-08 ENCOUNTER — HOSPITAL ENCOUNTER (EMERGENCY)
Facility: HOSPITAL | Age: 24
Discharge: HOME | End: 2024-02-08
Payer: COMMERCIAL

## 2024-02-08 VITALS
BODY MASS INDEX: 52.34 KG/M2 | RESPIRATION RATE: 18 BRPM | DIASTOLIC BLOOD PRESSURE: 79 MMHG | HEART RATE: 101 BPM | WEIGHT: 293 LBS | SYSTOLIC BLOOD PRESSURE: 136 MMHG | OXYGEN SATURATION: 99 % | TEMPERATURE: 99.1 F

## 2024-02-08 DIAGNOSIS — L02.91 ABSCESS: Primary | ICD-10-CM

## 2024-02-08 PROCEDURE — 99282 EMERGENCY DEPT VISIT SF MDM: CPT

## 2024-02-08 PROCEDURE — 10061 I&D ABSCESS COMP/MULTIPLE: CPT

## 2024-02-08 RX ORDER — BUPIVACAINE HYDROCHLORIDE 5 MG/ML
10 INJECTION, SOLUTION EPIDURAL; INTRACAUDAL
Status: DISCONTINUED | OUTPATIENT
Start: 2024-02-08 | End: 2024-02-08 | Stop reason: HOSPADM

## 2024-02-08 ASSESSMENT — PAIN SCALES - GENERAL: PAINLEVEL_OUTOF10: 8

## 2024-02-08 NOTE — DISCHARGE INSTRUCTIONS
Continue taking Cefdinir.     It was a pleasure taking care of you at [facility] today.  We know that when you come to [facility], you are entrusting us with your health, comfort, and safety.  Our physicians and nurses honor that trust, and we truly appreciate the opportunity to care for you and your loved ones.      We also value your feedback.  If you receive a survey about your Emergency Department experience today, please fill it out.  We care about our patients' feedback, and we listen to what you have to say.  Thank you!

## 2024-02-08 NOTE — ED PROVIDER NOTES
[facility] EMERGENCY DEPT  EMERGENCY DEPARTMENT ENCOUNTER       Pt Name: Kristin Dover  MRN: [id number]  Birthdate 2000  Date of evaluation: 2/8/2024  Provider: CRIS Merida   PCP: Meenakshi Brewer APRN - NP  Note Started: 2:35 PM EST 2/8/24     CHIEF COMPLAINT       Chief Complaint   Patient presents with    Abscess     A boil that started hurting 2 to 3 days ago which is on the right of private area, where a cut is too-she thinks cut scratch from her nails. It got a little hard on one side, it drained a little bit      HISTORY OF PRESENT ILLNESS: 1 or more elements      History From: Patient  HPI Limitations: None     Kristin Dover is a 23 y.o. female who presents by POV with complaints of a boil to the right groin.  It started 2 to 3 days ago.  It has been painful and itchy.  She scratched the area and notes a scant amount of purulent drainage.  She has a history of abscesses that needed drainage in the past.  She has had subjective fevers but has not taken her temperature.  No nausea, vomiting, or urinary complaints.  Patient reports that she had a full course of cefdinir, which she started yesterday.  She has been applying warm compresses.  There has been no other treatment prior to arrival.     Nursing Notes were all reviewed and agreed with or any disagreements were addressed in the HPI.     REVIEW OF SYSTEMS      Review of Systems     Positives and Pertinent negatives as per HPI.    PAST HISTORY     Past Medical History:  Past Medical History:   Diagnosis Date    Anxiety and depression     History of blood clots     Lupus (HCC)     Pneumonia 07/23/2023    and January 2023    Pneumonia 11/01/2023    Double       Past Surgical History:  Past Surgical History:   Procedure Laterality Date    CT BIOPSY RENAL  11/21/2022    CT BIOPSY RENAL 11/21/2022 [facility] RAD CT    WISDOM TOOTH EXTRACTION         Family History:  Family History   Problem Relation Age of Onset    No Known Problems Sister     No Known Problems

## 2024-02-10 ENCOUNTER — HOSPITAL ENCOUNTER (EMERGENCY)
Facility: HOSPITAL | Age: 24
Discharge: HOME | End: 2024-02-10
Attending: EMERGENCY MEDICINE
Payer: COMMERCIAL

## 2024-02-10 ENCOUNTER — APPOINTMENT (OUTPATIENT)
Facility: HOSPITAL | Age: 24
End: 2024-02-10
Payer: COMMERCIAL

## 2024-02-10 VITALS
OXYGEN SATURATION: 100 % | SYSTOLIC BLOOD PRESSURE: 121 MMHG | TEMPERATURE: 98.1 F | WEIGHT: 293 LBS | DIASTOLIC BLOOD PRESSURE: 81 MMHG | BODY MASS INDEX: 52.34 KG/M2 | HEART RATE: 93 BPM | RESPIRATION RATE: 18 BRPM

## 2024-02-10 DIAGNOSIS — L02.214 ABSCESS OF GROIN: Primary | ICD-10-CM

## 2024-02-10 PROCEDURE — 6370000000 HC RX 637 (ALT 250 FOR IP): Performed by: EMERGENCY MEDICINE

## 2024-02-10 PROCEDURE — 71046 X-RAY EXAM CHEST 2 VIEWS: CPT

## 2024-02-10 PROCEDURE — 99283 EMERGENCY DEPT VISIT LOW MDM: CPT

## 2024-02-10 RX ORDER — HYDROCODONE BITARTRATE AND ACETAMINOPHEN 5; 325 MG/1; MG/1
1 TABLET ORAL EVERY 6 HOURS PRN
Qty: 12 TABLET | Refills: 0 | Status: SHIPPED | OUTPATIENT
Start: 2024-02-10 | End: 2024-02-13

## 2024-02-10 RX ORDER — SULFAMETHOXAZOLE AND TRIMETHOPRIM 800; 160 MG/1; MG/1
1 TABLET ORAL 2 TIMES DAILY
Qty: 20 TABLET | Refills: 0 | Status: SHIPPED | OUTPATIENT
Start: 2024-02-10 | End: 2024-02-20

## 2024-02-10 RX ORDER — HYDROCODONE BITARTRATE AND ACETAMINOPHEN 5; 325 MG/1; MG/1
1 TABLET ORAL
Status: COMPLETED | OUTPATIENT
Start: 2024-02-10 | End: 2024-02-10

## 2024-02-10 RX ORDER — SULFAMETHOXAZOLE AND TRIMETHOPRIM 800; 160 MG/1; MG/1
1 TABLET ORAL
Status: COMPLETED | OUTPATIENT
Start: 2024-02-10 | End: 2024-02-10

## 2024-02-10 RX ADMIN — SULFAMETHOXAZOLE AND TRIMETHOPRIM 1 TABLET: 800; 160 TABLET ORAL at 12:03

## 2024-02-10 RX ADMIN — HYDROCODONE BITARTRATE AND ACETAMINOPHEN 1 TABLET: 5; 325 TABLET ORAL at 12:03

## 2024-02-10 ASSESSMENT — PAIN SCALES - GENERAL: PAINLEVEL_OUTOF10: 9

## 2024-02-11 NOTE — ED PROVIDER NOTES
[facility] EMERGENCY DEPT  EMERGENCY DEPARTMENT ENCOUNTER       Pt Name: Kristin Dover  MRN: [id number]  Birthdate 2000  Date of evaluation: 2/10/2024  Provider: Getachew Andrade DO   PCP: Meenakshi Brewer APRN - NP  Note Started: 7:37 AM EST 2/11/24     CHIEF COMPLAINT       Chief Complaint   Patient presents with    Abscess     The abscess moved over to the middle more. Hurts more when walking.         HISTORY OF PRESENT ILLNESS: 1 or more elements      History From: Patient, History limited by: none     Kristin Dover is a 23 y.o. female presents to the urgency department by private vehicle for evaluation of right groin pain.       Please See MDM for Additional Details of the HPI/PMH  Nursing Notes were all reviewed and agreed with or any disagreements were addressed in the HPI.     REVIEW OF SYSTEMS        Positives and Pertinent negatives as per HPI.    PAST HISTORY     Past Medical History:  Past Medical History:   Diagnosis Date    Anxiety and depression     History of blood clots     Lupus (HCC)     Pneumonia 07/23/2023    and January 2023    Pneumonia 11/01/2023    Double       Past Surgical History:  Past Surgical History:   Procedure Laterality Date    CT BIOPSY RENAL  11/21/2022    CT BIOPSY RENAL 11/21/2022 [facility] RAD CT    WISDOM TOOTH EXTRACTION         Family History:  Family History   Problem Relation Age of Onset    No Known Problems Sister     No Known Problems Brother     Lung Cancer Maternal Grandmother     Diabetes Maternal Grandmother     Hypertension Maternal Grandmother     No Known Problems Maternal Grandfather     Cancer Paternal Grandmother     No Known Problems Paternal Grandfather     No Known Problems Mother     No Known Problems Father     Cancer Sister         hodgkins lymphoma       Social History:  Social History     Tobacco Use    Smoking status: Never     Passive exposure: Never    Smokeless tobacco: Never   Vaping Use    Vaping Use: Never used   Substance Use Topics    Alcohol use: Yes

## 2024-02-23 ENCOUNTER — OFFICE VISIT (OUTPATIENT)
Age: 24
End: 2024-02-23
Payer: COMMERCIAL

## 2024-02-23 VITALS
DIASTOLIC BLOOD PRESSURE: 60 MMHG | HEART RATE: 101 BPM | TEMPERATURE: 99 F | BODY MASS INDEX: 50.02 KG/M2 | HEIGHT: 64 IN | SYSTOLIC BLOOD PRESSURE: 128 MMHG | WEIGHT: 293 LBS | RESPIRATION RATE: 22 BRPM | OXYGEN SATURATION: 99 %

## 2024-02-23 DIAGNOSIS — M32.14 SLE GLOMERULONEPHRITIS SYNDROME (HCC): ICD-10-CM

## 2024-02-23 DIAGNOSIS — Z09 HOSPITAL DISCHARGE FOLLOW-UP: ICD-10-CM

## 2024-02-23 DIAGNOSIS — D50.0 IRON DEFICIENCY ANEMIA SECONDARY TO BLOOD LOSS (CHRONIC): ICD-10-CM

## 2024-02-23 DIAGNOSIS — D84.9 IMMUNOSUPPRESSED STATUS (HCC): ICD-10-CM

## 2024-02-23 DIAGNOSIS — L73.2 HIDRADENITIS SUPPURATIVA: Primary | ICD-10-CM

## 2024-02-23 LAB
ANION GAP SERPL CALC-SCNC: 9 MMOL/L (ref 5–15)
BASOPHILS # BLD: 0 K/UL (ref 0–0.1)
BASOPHILS NFR BLD: 0 % (ref 0–1)
BUN SERPL-MCNC: 9 MG/DL (ref 6–20)
BUN/CREAT SERPL: 11 (ref 12–20)
CALCIUM SERPL-MCNC: 8.6 MG/DL (ref 8.5–10.1)
CHLORIDE SERPL-SCNC: 107 MMOL/L (ref 97–108)
CO2 SERPL-SCNC: 25 MMOL/L (ref 21–32)
CREAT SERPL-MCNC: 0.79 MG/DL (ref 0.55–1.02)
DIFFERENTIAL METHOD BLD: ABNORMAL
EOSINOPHIL # BLD: 0.1 K/UL (ref 0–0.4)
EOSINOPHIL NFR BLD: 1 % (ref 0–7)
ERYTHROCYTE [DISTWIDTH] IN BLOOD BY AUTOMATED COUNT: 13.8 % (ref 11.5–14.5)
GLUCOSE SERPL-MCNC: 99 MG/DL (ref 65–100)
HCT VFR BLD AUTO: 27.3 % (ref 35–47)
HGB BLD-MCNC: 8.8 G/DL (ref 11.5–16)
IMM GRANULOCYTES # BLD AUTO: 0 K/UL (ref 0–0.04)
IMM GRANULOCYTES NFR BLD AUTO: 0 % (ref 0–0.5)
LYMPHOCYTES # BLD: 0.4 K/UL (ref 0.8–3.5)
LYMPHOCYTES NFR BLD: 6 % (ref 12–49)
MCH RBC QN AUTO: 29.3 PG (ref 26–34)
MCHC RBC AUTO-ENTMCNC: 32.2 G/DL (ref 30–36.5)
MCV RBC AUTO: 91 FL (ref 80–99)
MONOCYTES # BLD: 0.6 K/UL (ref 0–1)
MONOCYTES NFR BLD: 8 % (ref 5–13)
NEUTS SEG # BLD: 6.3 K/UL (ref 1.8–8)
NEUTS SEG NFR BLD: 85 % (ref 32–75)
NRBC # BLD: 0 K/UL (ref 0–0.01)
NRBC BLD-RTO: 0 PER 100 WBC
PLATELET # BLD AUTO: 228 K/UL (ref 150–400)
PMV BLD AUTO: 10.3 FL (ref 8.9–12.9)
POTASSIUM SERPL-SCNC: 3.1 MMOL/L (ref 3.5–5.1)
RBC # BLD AUTO: 3 M/UL (ref 3.8–5.2)
RBC MORPH BLD: ABNORMAL
SODIUM SERPL-SCNC: 141 MMOL/L (ref 136–145)
WBC # BLD AUTO: 7.4 K/UL (ref 3.6–11)

## 2024-02-23 PROCEDURE — 99214 OFFICE O/P EST MOD 30 MIN: CPT

## 2024-02-23 PROCEDURE — 1111F DSCHRG MED/CURRENT MED MERGE: CPT

## 2024-02-23 SDOH — ECONOMIC STABILITY: INCOME INSECURITY: HOW HARD IS IT FOR YOU TO PAY FOR THE VERY BASICS LIKE FOOD, HOUSING, MEDICAL CARE, AND HEATING?: NOT HARD AT ALL

## 2024-02-23 SDOH — ECONOMIC STABILITY: FOOD INSECURITY: WITHIN THE PAST 12 MONTHS, YOU WORRIED THAT YOUR FOOD WOULD RUN OUT BEFORE YOU GOT MONEY TO BUY MORE.: NEVER TRUE

## 2024-02-23 SDOH — ECONOMIC STABILITY: FOOD INSECURITY: WITHIN THE PAST 12 MONTHS, THE FOOD YOU BOUGHT JUST DIDN'T LAST AND YOU DIDN'T HAVE MONEY TO GET MORE.: NEVER TRUE

## 2024-02-23 ASSESSMENT — PATIENT HEALTH QUESTIONNAIRE - PHQ9
2. FEELING DOWN, DEPRESSED OR HOPELESS: 0
SUM OF ALL RESPONSES TO PHQ QUESTIONS 1-9: 0
SUM OF ALL RESPONSES TO PHQ9 QUESTIONS 1 & 2: 0
SUM OF ALL RESPONSES TO PHQ QUESTIONS 1-9: 0
1. LITTLE INTEREST OR PLEASURE IN DOING THINGS: 0
SUM OF ALL RESPONSES TO PHQ QUESTIONS 1-9: 0
SUM OF ALL RESPONSES TO PHQ QUESTIONS 1-9: 0

## 2024-02-23 ASSESSMENT — ANXIETY QUESTIONNAIRES
4. TROUBLE RELAXING: 0
3. WORRYING TOO MUCH ABOUT DIFFERENT THINGS: 0
5. BEING SO RESTLESS THAT IT IS HARD TO SIT STILL: 0
IF YOU CHECKED OFF ANY PROBLEMS ON THIS QUESTIONNAIRE, HOW DIFFICULT HAVE THESE PROBLEMS MADE IT FOR YOU TO DO YOUR WORK, TAKE CARE OF THINGS AT HOME, OR GET ALONG WITH OTHER PEOPLE: NOT DIFFICULT AT ALL
6. BECOMING EASILY ANNOYED OR IRRITABLE: 0
GAD7 TOTAL SCORE: 0
7. FEELING AFRAID AS IF SOMETHING AWFUL MIGHT HAPPEN: 0
1. FEELING NERVOUS, ANXIOUS, OR ON EDGE: 0
2. NOT BEING ABLE TO STOP OR CONTROL WORRYING: 0

## 2024-02-27 ENCOUNTER — TELEMEDICINE (OUTPATIENT)
Age: 24
End: 2024-02-27
Payer: COMMERCIAL

## 2024-02-27 DIAGNOSIS — D84.9 IMMUNOSUPPRESSED STATUS (HCC): ICD-10-CM

## 2024-02-27 DIAGNOSIS — Z02.89 ENCOUNTER FOR COMPLETION OF FORM WITH PATIENT: ICD-10-CM

## 2024-02-27 DIAGNOSIS — L73.2 HIDRADENITIS SUPPURATIVA: Primary | ICD-10-CM

## 2024-02-27 PROCEDURE — 99213 OFFICE O/P EST LOW 20 MIN: CPT

## 2024-02-27 ASSESSMENT — PATIENT HEALTH QUESTIONNAIRE - PHQ9
SUM OF ALL RESPONSES TO PHQ QUESTIONS 1-9: 0
2. FEELING DOWN, DEPRESSED OR HOPELESS: 0
SUM OF ALL RESPONSES TO PHQ9 QUESTIONS 1 & 2: 0
SUM OF ALL RESPONSES TO PHQ QUESTIONS 1-9: 0
1. LITTLE INTEREST OR PLEASURE IN DOING THINGS: 0

## 2024-02-27 ASSESSMENT — ENCOUNTER SYMPTOMS
BLOOD IN STOOL: 0
DIARRHEA: 0
CONSTIPATION: 0
WHEEZING: 0
SHORTNESS OF BREATH: 0
RESPIRATORY NEGATIVE: 1
EYES NEGATIVE: 1
ALLERGIC/IMMUNOLOGIC NEGATIVE: 1
COUGH: 0

## 2024-02-27 NOTE — PROGRESS NOTES
\"Have you been to the ER, urgent care clinic since your last visit?  Hospitalized since your last visit?\"    NO    “Have you seen or consulted any other health care providers outside of [facility] since your last visit?”    NO    Chief Complaint   Patient presents with    Other     Complete short term disability forms        There were no vitals filed for this visit.  
visit.    --Meenakshi Brewer, APRN - NP

## 2024-03-04 DIAGNOSIS — L73.2 HIDRADENITIS SUPPURATIVA: Primary | ICD-10-CM

## 2024-03-04 RX ORDER — DOXYCYCLINE HYCLATE 100 MG
100 TABLET ORAL 2 TIMES DAILY
Qty: 20 TABLET | Refills: 0 | Status: SHIPPED | OUTPATIENT
Start: 2024-03-04 | End: 2024-03-14

## 2024-06-24 ENCOUNTER — APPOINTMENT (OUTPATIENT)
Facility: HOSPITAL | Age: 24
End: 2024-06-24
Payer: COMMERCIAL

## 2024-06-24 ENCOUNTER — HOSPITAL ENCOUNTER (EMERGENCY)
Facility: HOSPITAL | Age: 24
Discharge: HOME | End: 2024-06-24
Payer: COMMERCIAL

## 2024-06-24 VITALS
HEIGHT: 64 IN | RESPIRATION RATE: 17 BRPM | SYSTOLIC BLOOD PRESSURE: 153 MMHG | DIASTOLIC BLOOD PRESSURE: 110 MMHG | TEMPERATURE: 98.6 F | OXYGEN SATURATION: 100 % | WEIGHT: 293 LBS | BODY MASS INDEX: 50.02 KG/M2 | HEART RATE: 95 BPM

## 2024-06-24 DIAGNOSIS — J06.9 VIRAL URI WITH COUGH: Primary | ICD-10-CM

## 2024-06-24 PROCEDURE — 99283 EMERGENCY DEPT VISIT LOW MDM: CPT

## 2024-06-24 PROCEDURE — 71046 X-RAY EXAM CHEST 2 VIEWS: CPT

## 2024-06-24 RX ORDER — DEXTROMETHORPHAN HYDROBROMIDE AND PROMETHAZINE HYDROCHLORIDE 15; 6.25 MG/5ML; MG/5ML
5 SYRUP ORAL 4 TIMES DAILY PRN
Qty: 180 ML | Refills: 0 | Status: SHIPPED | OUTPATIENT
Start: 2024-06-24 | End: 2024-07-01

## 2024-06-24 RX ORDER — FLUTICASONE PROPIONATE 50 MCG
1 SPRAY, SUSPENSION (ML) NASAL DAILY
Qty: 32 G | Refills: 0 | Status: SHIPPED | OUTPATIENT
Start: 2024-06-24

## 2024-06-24 ASSESSMENT — PAIN DESCRIPTION - PAIN TYPE: TYPE: ACUTE PAIN

## 2024-06-24 ASSESSMENT — LIFESTYLE VARIABLES
HOW MANY STANDARD DRINKS CONTAINING ALCOHOL DO YOU HAVE ON A TYPICAL DAY: PATIENT DOES NOT DRINK
HOW OFTEN DO YOU HAVE A DRINK CONTAINING ALCOHOL: NEVER

## 2024-06-24 ASSESSMENT — PAIN DESCRIPTION - LOCATION: LOCATION: EAR

## 2024-06-24 ASSESSMENT — PAIN - FUNCTIONAL ASSESSMENT: PAIN_FUNCTIONAL_ASSESSMENT: 0-10

## 2024-06-24 ASSESSMENT — PAIN DESCRIPTION - ORIENTATION: ORIENTATION: LEFT;RIGHT

## 2024-06-24 ASSESSMENT — PAIN SCALES - GENERAL: PAINLEVEL_OUTOF10: 1

## 2024-06-24 NOTE — DISCHARGE INSTRUCTIONS
Thank you for choosing our Emergency Department for your care.  It is our privilege to care for you in your time of need.  In the next several days, you may receive a survey via email or mailed to your home about your experience with our team.  We would greatly appreciate you taking a few minutes to complete the survey, as we use this information to learn what we have done well and what we could be doing better. Thank you for trusting us with your care!    Below you will find a list of your tests from today's visit.   Labs  No results found for this or any previous visit (from the past 12 hour(s)).    Radiologic Studies  XR CHEST (2 VW)   Final Result   No acute cardiopulmonary disease.         Electronically signed by Connor Schmidt MD        ------------------------------------------------------------------------------------------------------------  The evaluation and treatment you received in the Emergency Department were for an urgent problem. It is important that you follow-up with a doctor, nurse practitioner, or physician assistant to:  (1) confirm your diagnosis,  (2) re-evaluation of changes in your illness and treatment, and (3) for ongoing care. Please take your discharge instructions with you when you go to your follow-up appointment.     If you have any problem arranging a follow-up appointment, contact us!  If your symptoms become worse or you do not improve as expected, please return to us. We are available 24 hours a day.     If a prescription has been provided, please fill it as soon as possible to prevent a delay in treatment. If you have any questions or reservations about taking the medication due to side effects or interactions with other medications, please call your primary care provider or contact us directly.  Again, THANK YOU for choosing us to care for YOU!

## 2024-06-24 NOTE — ED PROVIDER NOTES
[facility] EMERGENCY DEPT  EMERGENCY DEPARTMENT HISTORY AND PHYSICAL EXAM      Date: 6/24/2024  Patient Name: Kristin Dover  MRN: [id number]  YOB: 2000  Date of evaluation: 6/24/2024  Provider: DOMINICK Kelly NP   Note Started: 12:39 PM EDT 6/24/24    HISTORY OF PRESENT ILLNESS     Chief Complaint   Patient presents with    Otalgia     Pt c/o bilateral ear pain x one week. Pt states she thinks she has an ear infection    Cough     Pt also c/o cough x one week and wants to r/o PNA       History Provided By: Patient    HPI: Kristin Dover is a 24 y.o. female with past medical history as listed below, presents ambulatory to the emergency department with complaints of nasal congestion, bilateral ear fullness/crackling, and nonproductive cough for 1 week.  History of pneumonia, patient wants a chest x-ray to be sure she does not have this today.  Denies fever/chills, chest pain, difficulty breathing, sore throat, abdominal pain, nausea/vomiting, diarrhea, urinary symptoms, rash. Upon arrival to the ED pt is alert and oriented x 3, well-appearing, and interacting appropriately; no obvious distress noted.      PAST MEDICAL HISTORY   Past Medical History:  Past Medical History:   Diagnosis Date    Anxiety and depression     History of blood clots     Lupus (HCC)     Pneumonia 07/23/2023    and January 2023    Pneumonia 11/01/2023    Double       Past Surgical History:  Past Surgical History:   Procedure Laterality Date    CT BIOPSY RENAL  11/21/2022    CT BIOPSY RENAL 11/21/2022 [facility] RAD CT    WISDOM TOOTH EXTRACTION         Family History:  Family History   Problem Relation Age of Onset    No Known Problems Sister     No Known Problems Brother     Lung Cancer Maternal Grandmother     Diabetes Maternal Grandmother     Hypertension Maternal Grandmother     No Known Problems Maternal Grandfather     Cancer Paternal Grandmother     No Known Problems Paternal Grandfather     No Known Problems Mother     No Known Problems

## 2024-09-25 ENCOUNTER — TELEPHONE (OUTPATIENT)
Age: 24
End: 2024-09-25

## 2024-09-26 ENCOUNTER — OFFICE VISIT (OUTPATIENT)
Age: 24
End: 2024-09-26
Payer: COMMERCIAL

## 2024-09-26 VITALS
HEART RATE: 84 BPM | DIASTOLIC BLOOD PRESSURE: 84 MMHG | WEIGHT: 293 LBS | SYSTOLIC BLOOD PRESSURE: 134 MMHG | BODY MASS INDEX: 50.02 KG/M2 | HEIGHT: 64 IN

## 2024-09-26 DIAGNOSIS — E04.2 NONTOXIC MULTINODULAR GOITER: ICD-10-CM

## 2024-09-26 DIAGNOSIS — E03.9 ACQUIRED HYPOTHYROIDISM: Primary | ICD-10-CM

## 2024-09-26 PROCEDURE — 99214 OFFICE O/P EST MOD 30 MIN: CPT | Performed by: INTERNAL MEDICINE

## 2024-09-26 RX ORDER — PREDNISONE 5 MG/1
5 TABLET ORAL DAILY
COMMUNITY
Start: 2024-09-19

## 2024-09-27 DIAGNOSIS — E03.9 ACQUIRED HYPOTHYROIDISM: Primary | ICD-10-CM

## 2024-09-27 LAB
ALBUMIN SERPL-MCNC: 2.8 G/DL (ref 3.5–5)
ALBUMIN/GLOB SERPL: 0.8 (ref 1.1–2.2)
ALP SERPL-CCNC: 60 U/L (ref 45–117)
ALT SERPL-CCNC: 20 U/L (ref 12–78)
AMORPH CRY URNS QL MICRO: ABNORMAL
ANION GAP SERPL CALC-SCNC: 4 MMOL/L (ref 2–12)
APPEARANCE UR: ABNORMAL
AST SERPL-CCNC: 21 U/L (ref 15–37)
BACTERIA URNS QL MICRO: NEGATIVE /HPF
BILIRUB SERPL-MCNC: 0.2 MG/DL (ref 0.2–1)
BILIRUB UR QL CFM: NEGATIVE
BUN SERPL-MCNC: 9 MG/DL (ref 6–20)
BUN/CREAT SERPL: 11 (ref 12–20)
CALCIUM SERPL-MCNC: 8 MG/DL (ref 8.5–10.1)
CHLORIDE SERPL-SCNC: 108 MMOL/L (ref 97–108)
CO2 SERPL-SCNC: 28 MMOL/L (ref 21–32)
COLOR UR: ABNORMAL
CREAT SERPL-MCNC: 0.8 MG/DL (ref 0.55–1.02)
CREAT UR-MCNC: 548 MG/DL
EPITH CASTS URNS QL MICRO: ABNORMAL /LPF
GLOBULIN SER CALC-MCNC: 3.6 G/DL (ref 2–4)
GLUCOSE SERPL-MCNC: 98 MG/DL (ref 65–100)
GLUCOSE UR STRIP.AUTO-MCNC: NEGATIVE MG/DL
HGB UR QL STRIP: ABNORMAL
KETONES UR QL STRIP.AUTO: NEGATIVE MG/DL
LEUKOCYTE ESTERASE UR QL STRIP.AUTO: ABNORMAL
MICROALBUMIN UR-MCNC: 402 MG/DL
MICROALBUMIN/CREAT UR-RTO: 734 MG/G (ref 0–30)
NITRITE UR QL STRIP.AUTO: NEGATIVE
PH UR STRIP: 6 (ref 5–8)
POTASSIUM SERPL-SCNC: 3.3 MMOL/L (ref 3.5–5.1)
PROT SERPL-MCNC: 6.4 G/DL (ref 6.4–8.2)
PROT UR STRIP-MCNC: >300 MG/DL
RBC #/AREA URNS HPF: ABNORMAL /HPF (ref 0–5)
SODIUM SERPL-SCNC: 140 MMOL/L (ref 136–145)
SP GR UR REFRACTOMETRY: 1.03 (ref 1–1.03)
T4 FREE SERPL-MCNC: 1 NG/DL (ref 0.8–1.5)
T4 SERPL-MCNC: 8.8 UG/DL (ref 4.8–13.9)
TSH SERPL DL<=0.05 MIU/L-ACNC: 4.35 UIU/ML (ref 0.36–3.74)
URINE CULTURE IF INDICATED: ABNORMAL
UROBILINOGEN UR QL STRIP.AUTO: 1 EU/DL (ref 0.2–1)
WBC URNS QL MICRO: ABNORMAL /HPF (ref 0–4)

## 2024-09-27 RX ORDER — LEVOTHYROXINE SODIUM 50 UG/1
50 TABLET ORAL DAILY
Qty: 90 TABLET | Refills: 0 | Status: SHIPPED | OUTPATIENT
Start: 2024-09-27

## 2024-10-04 NOTE — ED NOTES
Anesthesia Post Evaluation    Patient: Bell Lynn    Procedure(s) Performed: Procedure(s) (LRB):  COLONOSCOPY (N/A)    Final Anesthesia Type: general      Patient location during evaluation: GI PACU  Patient participation: Yes- Able to Participate  Level of consciousness: awake and alert  Post-procedure vital signs: reviewed and stable  Pain management: adequate  Airway patency: patent    PONV status at discharge: No PONV  Anesthetic complications: no      Cardiovascular status: hemodynamically stable  Respiratory status: unassisted and spontaneous ventilation  Hydration status: euvolemic  Follow-up not needed.              Vitals Value Taken Time   /86 10/04/24 0946   Temp 36.6 °C (97.9 °F) 10/04/24 0916   Pulse 64 10/04/24 0946   Resp 18 10/04/24 0946   SpO2 97 % 10/04/24 0946         No case tracking events are documented in the log.      Pain/Rylee Score: Rylee Score: 10 (10/4/2024  9:46 AM)           TRANSFER - OUT REPORT:    Verbal report given to FRANCES Cottrell on [facility]  being transferred to [facility] for routine progression of patient care       Report consisted of patient's Situation, Background, Assessment and   Recommendations(SBAR). Information from the following report(s) Nurse Handoff Report, ED Encounter Summary, ED SBAR, and [facility] was reviewed with the receiving nurse. [place] Fall Assessment:    Presents to emergency department  because of falls (Syncope, seizure, or loss of consciousness): No  Age > 70: No  Altered Mental Status, Intoxication with alcohol or substance confusion (Disorientation, impaired judgment, poor safety awaremess, or inability to follow instructions): No  Impaired Mobility: Ambulates or transfers with assistive devices or assistance; Unable to ambulate or transer.: No  Nursing Judgement: No          Lines:   Peripheral IV 11/01/23 Left Antecubital (Active)        Opportunity for questions and clarification was provided.       Patient transported with:  Monitor, O2 @ 4lpm, and Registered Nurse           Shelley Rodríguez RN  11/01/23 0976

## 2024-10-10 DIAGNOSIS — E55.9 VITAMIN D DEFICIENCY: ICD-10-CM

## 2024-10-10 RX ORDER — ERGOCALCIFEROL 1.25 MG/1
50000 CAPSULE, LIQUID FILLED ORAL WEEKLY
Qty: 12 CAPSULE | Refills: 0 | Status: SHIPPED | OUTPATIENT
Start: 2024-10-10

## 2024-10-20 ENCOUNTER — APPOINTMENT (OUTPATIENT)
Facility: HOSPITAL | Age: 24
DRG: 194 | End: 2024-10-20
Payer: COMMERCIAL

## 2024-10-20 ENCOUNTER — HOSPITAL ENCOUNTER (INPATIENT)
Facility: HOSPITAL | Age: 24
LOS: 2 days | Discharge: HOME | DRG: 194 | End: 2024-10-22
Attending: EMERGENCY MEDICINE | Admitting: INTERNAL MEDICINE
Payer: COMMERCIAL

## 2024-10-20 DIAGNOSIS — R06.02 SHORTNESS OF BREATH: ICD-10-CM

## 2024-10-20 DIAGNOSIS — J96.01 ACUTE RESPIRATORY FAILURE WITH HYPOXIA: Primary | ICD-10-CM

## 2024-10-20 DIAGNOSIS — E87.6 HYPOKALEMIA: ICD-10-CM

## 2024-10-20 DIAGNOSIS — J18.9 COMMUNITY ACQUIRED PNEUMONIA, UNSPECIFIED LATERALITY: ICD-10-CM

## 2024-10-20 LAB
ALBUMIN SERPL-MCNC: 2.7 G/DL (ref 3.5–5)
ALBUMIN/GLOB SERPL: 0.7 (ref 1.1–2.2)
ALP SERPL-CCNC: 62 U/L (ref 45–117)
ALT SERPL-CCNC: 19 U/L (ref 12–78)
ANION GAP SERPL CALC-SCNC: 7 MMOL/L (ref 2–12)
AST SERPL-CCNC: 21 U/L (ref 15–37)
BASOPHILS # BLD: 0 K/UL (ref 0–0.1)
BASOPHILS NFR BLD: 0 % (ref 0–1)
BILIRUB SERPL-MCNC: 0.3 MG/DL (ref 0.2–1)
BUN SERPL-MCNC: 11 MG/DL (ref 6–20)
BUN/CREAT SERPL: 16 (ref 12–20)
CALCIUM SERPL-MCNC: 8.7 MG/DL (ref 8.5–10.1)
CHLORIDE SERPL-SCNC: 110 MMOL/L (ref 97–108)
CO2 SERPL-SCNC: 24 MMOL/L (ref 21–32)
COMMENT:: NORMAL
CREAT SERPL-MCNC: 0.67 MG/DL (ref 0.55–1.02)
CRP SERPL-MCNC: 0.61 MG/DL (ref 0–0.3)
DIFFERENTIAL METHOD BLD: ABNORMAL
EOSINOPHIL # BLD: 0 K/UL (ref 0–0.4)
EOSINOPHIL NFR BLD: 0 % (ref 0–7)
ERYTHROCYTE [DISTWIDTH] IN BLOOD BY AUTOMATED COUNT: 12.7 % (ref 11.5–14.5)
ERYTHROCYTE [SEDIMENTATION RATE] IN BLOOD: 58 MM/HR (ref 0–20)
FLUAV RNA SPEC QL NAA+PROBE: NOT DETECTED
FLUBV RNA SPEC QL NAA+PROBE: NOT DETECTED
GLOBULIN SER CALC-MCNC: 3.9 G/DL (ref 2–4)
GLUCOSE SERPL-MCNC: 100 MG/DL (ref 65–100)
HCG SERPL QL: NEGATIVE
HCT VFR BLD AUTO: 28.7 % (ref 35–47)
HGB BLD-MCNC: 9.6 G/DL (ref 11.5–16)
IMM GRANULOCYTES # BLD AUTO: 0 K/UL (ref 0–0.04)
IMM GRANULOCYTES NFR BLD AUTO: 0 % (ref 0–0.5)
LACTATE BLD-SCNC: 1.1 MMOL/L (ref 0.4–2)
LYMPHOCYTES # BLD: 0.5 K/UL (ref 0.8–3.5)
LYMPHOCYTES NFR BLD: 13 % (ref 12–49)
MCH RBC QN AUTO: 29.3 PG (ref 26–34)
MCHC RBC AUTO-ENTMCNC: 33.4 G/DL (ref 30–36.5)
MCV RBC AUTO: 87.5 FL (ref 80–99)
MONOCYTES # BLD: 0.4 K/UL (ref 0–1)
MONOCYTES NFR BLD: 11 % (ref 5–13)
NEUTS SEG # BLD: 3.1 K/UL (ref 1.8–8)
NEUTS SEG NFR BLD: 76 % (ref 32–75)
NRBC # BLD: 0 K/UL (ref 0–0.01)
NRBC BLD-RTO: 0 PER 100 WBC
NT PRO BNP: 167 PG/ML
PLATELET # BLD AUTO: 220 K/UL (ref 150–400)
PMV BLD AUTO: 10.8 FL (ref 8.9–12.9)
POTASSIUM SERPL-SCNC: 3.3 MMOL/L (ref 3.5–5.1)
PROT SERPL-MCNC: 6.6 G/DL (ref 6.4–8.2)
RBC # BLD AUTO: 3.28 M/UL (ref 3.8–5.2)
RBC MORPH BLD: ABNORMAL
SARS-COV-2 RNA RESP QL NAA+PROBE: NOT DETECTED
SODIUM SERPL-SCNC: 141 MMOL/L (ref 136–145)
SOURCE: NORMAL
SPECIMEN HOLD: NORMAL
TROPONIN I SERPL HS-MCNC: 11 NG/L (ref 0–51)
WBC # BLD AUTO: 4 K/UL (ref 3.6–11)
WBC MORPH BLD: ABNORMAL

## 2024-10-20 PROCEDURE — 83605 ASSAY OF LACTIC ACID: CPT

## 2024-10-20 PROCEDURE — 96365 THER/PROPH/DIAG IV INF INIT: CPT

## 2024-10-20 PROCEDURE — 80053 COMPREHEN METABOLIC PANEL: CPT

## 2024-10-20 PROCEDURE — 96375 TX/PRO/DX INJ NEW DRUG ADDON: CPT

## 2024-10-20 PROCEDURE — 86235 NUCLEAR ANTIGEN ANTIBODY: CPT

## 2024-10-20 PROCEDURE — 71275 CT ANGIOGRAPHY CHEST: CPT

## 2024-10-20 PROCEDURE — 86225 DNA ANTIBODY NATIVE: CPT

## 2024-10-20 PROCEDURE — 6360000002 HC RX W HCPCS: Performed by: INTERNAL MEDICINE

## 2024-10-20 PROCEDURE — 1100000003 HC PRIVATE W/ TELEMETRY

## 2024-10-20 PROCEDURE — 87040 BLOOD CULTURE FOR BACTERIA: CPT

## 2024-10-20 PROCEDURE — 86140 C-REACTIVE PROTEIN: CPT

## 2024-10-20 PROCEDURE — 93005 ELECTROCARDIOGRAM TRACING: CPT | Performed by: EMERGENCY MEDICINE

## 2024-10-20 PROCEDURE — 87636 SARSCOV2 & INF A&B AMP PRB: CPT

## 2024-10-20 PROCEDURE — 84703 CHORIONIC GONADOTROPIN ASSAY: CPT

## 2024-10-20 PROCEDURE — 85652 RBC SED RATE AUTOMATED: CPT

## 2024-10-20 PROCEDURE — 99285 EMERGENCY DEPT VISIT HI MDM: CPT

## 2024-10-20 PROCEDURE — 85025 COMPLETE CBC W/AUTO DIFF WBC: CPT

## 2024-10-20 PROCEDURE — 36415 COLL VENOUS BLD VENIPUNCTURE: CPT

## 2024-10-20 PROCEDURE — 84484 ASSAY OF TROPONIN QUANT: CPT

## 2024-10-20 PROCEDURE — 6370000000 HC RX 637 (ALT 250 FOR IP): Performed by: INTERNAL MEDICINE

## 2024-10-20 PROCEDURE — 2580000003 HC RX 258: Performed by: EMERGENCY MEDICINE

## 2024-10-20 PROCEDURE — 6360000002 HC RX W HCPCS: Performed by: EMERGENCY MEDICINE

## 2024-10-20 PROCEDURE — 2580000003 HC RX 258: Performed by: INTERNAL MEDICINE

## 2024-10-20 PROCEDURE — 6360000004 HC RX CONTRAST MEDICATION: Performed by: EMERGENCY MEDICINE

## 2024-10-20 PROCEDURE — 83880 ASSAY OF NATRIURETIC PEPTIDE: CPT

## 2024-10-20 RX ORDER — ACETAMINOPHEN 325 MG/1
650 TABLET ORAL EVERY 6 HOURS PRN
Status: DISCONTINUED | OUTPATIENT
Start: 2024-10-20 | End: 2024-10-22 | Stop reason: HOSPADM

## 2024-10-20 RX ORDER — ONDANSETRON 4 MG/1
4 TABLET, ORALLY DISINTEGRATING ORAL EVERY 8 HOURS PRN
Status: DISCONTINUED | OUTPATIENT
Start: 2024-10-20 | End: 2024-10-22 | Stop reason: HOSPADM

## 2024-10-20 RX ORDER — FUROSEMIDE 10 MG/ML
20 INJECTION INTRAMUSCULAR; INTRAVENOUS ONCE
Status: COMPLETED | OUTPATIENT
Start: 2024-10-20 | End: 2024-10-20

## 2024-10-20 RX ORDER — PREDNISONE 5 MG/1
5 TABLET ORAL DAILY
Status: DISCONTINUED | OUTPATIENT
Start: 2024-10-20 | End: 2024-10-22 | Stop reason: HOSPADM

## 2024-10-20 RX ORDER — MYCOPHENOLATE MOFETIL 250 MG/1
1000 CAPSULE ORAL 2 TIMES DAILY
Status: DISCONTINUED | OUTPATIENT
Start: 2024-10-20 | End: 2024-10-22 | Stop reason: HOSPADM

## 2024-10-20 RX ORDER — IOPAMIDOL 755 MG/ML
100 INJECTION, SOLUTION INTRAVASCULAR
Status: COMPLETED | OUTPATIENT
Start: 2024-10-20 | End: 2024-10-20

## 2024-10-20 RX ORDER — HYDROXYCHLOROQUINE SULFATE 200 MG/1
400 TABLET, FILM COATED ORAL DAILY
Status: DISCONTINUED | OUTPATIENT
Start: 2024-10-20 | End: 2024-10-22 | Stop reason: HOSPADM

## 2024-10-20 RX ORDER — POLYETHYLENE GLYCOL 3350 17 G/17G
17 POWDER, FOR SOLUTION ORAL DAILY PRN
Status: DISCONTINUED | OUTPATIENT
Start: 2024-10-20 | End: 2024-10-22 | Stop reason: HOSPADM

## 2024-10-20 RX ORDER — SODIUM CHLORIDE 0.9 % (FLUSH) 0.9 %
5-40 SYRINGE (ML) INJECTION EVERY 12 HOURS SCHEDULED
Status: DISCONTINUED | OUTPATIENT
Start: 2024-10-20 | End: 2024-10-22 | Stop reason: HOSPADM

## 2024-10-20 RX ORDER — ENOXAPARIN SODIUM 100 MG/ML
30 INJECTION SUBCUTANEOUS 2 TIMES DAILY
Status: DISCONTINUED | OUTPATIENT
Start: 2024-10-20 | End: 2024-10-22 | Stop reason: HOSPADM

## 2024-10-20 RX ORDER — ONDANSETRON 2 MG/ML
4 INJECTION INTRAMUSCULAR; INTRAVENOUS EVERY 4 HOURS PRN
Status: DISCONTINUED | OUTPATIENT
Start: 2024-10-20 | End: 2024-10-20 | Stop reason: ALTCHOICE

## 2024-10-20 RX ORDER — LOSARTAN POTASSIUM 25 MG/1
50 TABLET ORAL DAILY
Status: DISCONTINUED | OUTPATIENT
Start: 2024-10-20 | End: 2024-10-22 | Stop reason: HOSPADM

## 2024-10-20 RX ORDER — SODIUM CHLORIDE 9 MG/ML
INJECTION, SOLUTION INTRAVENOUS PRN
Status: DISCONTINUED | OUTPATIENT
Start: 2024-10-20 | End: 2024-10-22 | Stop reason: HOSPADM

## 2024-10-20 RX ORDER — ACETAMINOPHEN 325 MG/1
650 TABLET ORAL EVERY 4 HOURS PRN
Status: DISCONTINUED | OUTPATIENT
Start: 2024-10-20 | End: 2024-10-20 | Stop reason: ALTCHOICE

## 2024-10-20 RX ORDER — ACETAMINOPHEN 650 MG/1
650 SUPPOSITORY RECTAL EVERY 6 HOURS PRN
Status: DISCONTINUED | OUTPATIENT
Start: 2024-10-20 | End: 2024-10-22 | Stop reason: HOSPADM

## 2024-10-20 RX ORDER — LEVOTHYROXINE SODIUM 50 UG/1
50 TABLET ORAL DAILY
Status: DISCONTINUED | OUTPATIENT
Start: 2024-10-20 | End: 2024-10-22 | Stop reason: HOSPADM

## 2024-10-20 RX ORDER — SODIUM CHLORIDE 0.9 % (FLUSH) 0.9 %
5-40 SYRINGE (ML) INJECTION PRN
Status: DISCONTINUED | OUTPATIENT
Start: 2024-10-20 | End: 2024-10-22 | Stop reason: HOSPADM

## 2024-10-20 RX ORDER — ONDANSETRON 2 MG/ML
4 INJECTION INTRAMUSCULAR; INTRAVENOUS EVERY 6 HOURS PRN
Status: DISCONTINUED | OUTPATIENT
Start: 2024-10-20 | End: 2024-10-22 | Stop reason: HOSPADM

## 2024-10-20 RX ADMIN — LEVOTHYROXINE SODIUM 50 MCG: 0.05 TABLET ORAL at 14:56

## 2024-10-20 RX ADMIN — SODIUM CHLORIDE, PRESERVATIVE FREE 10 ML: 5 INJECTION INTRAVENOUS at 20:47

## 2024-10-20 RX ADMIN — PREDNISONE 5 MG: 5 TABLET ORAL at 14:56

## 2024-10-20 RX ADMIN — MYCOPHENOLATE MOFETIL 1000 MG: 250 CAPSULE ORAL at 14:54

## 2024-10-20 RX ADMIN — MYCOPHENOLATE MOFETIL 1000 MG: 250 CAPSULE ORAL at 20:43

## 2024-10-20 RX ADMIN — ONDANSETRON 4 MG: 2 INJECTION INTRAMUSCULAR; INTRAVENOUS at 08:04

## 2024-10-20 RX ADMIN — AZITHROMYCIN MONOHYDRATE 500 MG: 500 INJECTION, POWDER, LYOPHILIZED, FOR SOLUTION INTRAVENOUS at 07:09

## 2024-10-20 RX ADMIN — ENOXAPARIN SODIUM 30 MG: 100 INJECTION SUBCUTANEOUS at 20:43

## 2024-10-20 RX ADMIN — WATER 1000 MG: 1 INJECTION INTRAMUSCULAR; INTRAVENOUS; SUBCUTANEOUS at 07:03

## 2024-10-20 RX ADMIN — HYDROXYCHLOROQUINE SULFATE 400 MG: 200 TABLET ORAL at 14:55

## 2024-10-20 RX ADMIN — SODIUM CHLORIDE, PRESERVATIVE FREE 10 ML: 5 INJECTION INTRAVENOUS at 15:00

## 2024-10-20 RX ADMIN — LOSARTAN POTASSIUM 50 MG: 25 TABLET, FILM COATED ORAL at 14:56

## 2024-10-20 RX ADMIN — FUROSEMIDE 20 MG: 10 INJECTION, SOLUTION INTRAMUSCULAR; INTRAVENOUS at 14:59

## 2024-10-20 RX ADMIN — SODIUM CHLORIDE, PRESERVATIVE FREE 10 ML: 5 INJECTION INTRAVENOUS at 20:46

## 2024-10-20 RX ADMIN — IOPAMIDOL 100 ML: 755 INJECTION, SOLUTION INTRAVENOUS at 06:16

## 2024-10-20 RX ADMIN — ENOXAPARIN SODIUM 30 MG: 100 INJECTION SUBCUTANEOUS at 14:57

## 2024-10-20 ASSESSMENT — PAIN DESCRIPTION - LOCATION: LOCATION: CHEST

## 2024-10-20 ASSESSMENT — PAIN DESCRIPTION - DESCRIPTORS: DESCRIPTORS: DISCOMFORT

## 2024-10-20 ASSESSMENT — PAIN SCALES - GENERAL
PAINLEVEL_OUTOF10: 0
PAINLEVEL_OUTOF10: 5

## 2024-10-20 NOTE — ED NOTES
Report given to Leticia FARR RN. Nurse was informed of reason for arrival, vitals, labs, medications, orders, procedures, results, anything left pending and current plan of action. Questions were asked and received prior to departure from the patient.

## 2024-10-20 NOTE — H&P
141.3 kg (311 lb 8.2 oz)   03/28/23 128.5 kg (283 lb 6.4 oz)         PHYSICAL EXAM:  General:    Alert, cooperative, appears stated age.     Lungs:   CTA b/l.  No wheezing or Rhonchi. No rales.  Chest wall:  No tenderness.  No accessory muscle use.  Heart:   Regular  rhythm,  No  Murmur.   No edema  Abdomen:   Soft, NT. ND  BS+  Extremities: No cyanosis.  No clubbing,      Skin turgor normal, Radial dial pulse 2+. Capillary refill normal  Neurologic: No facial asymmetry. No aphasia or slurred speech. Symmetrical strength, Sensation grossly intact. AAOx4.         LAB DATA REVIEWED:    Recent Results (from the past 12 hour(s))   CBC with Auto Differential    Collection Time: 10/20/24  4:31 AM   Result Value Ref Range    WBC 4.0 3.6 - 11.0 K/uL    RBC 3.28 (L) 3.80 - 5.20 M/uL    Hemoglobin 9.6 (L) 11.5 - 16.0 g/dL    Hematocrit 28.7 (L) 35.0 - 47.0 %    MCV 87.5 80.0 - 99.0 FL    MCH 29.3 26.0 - 34.0 PG    MCHC 33.4 30.0 - 36.5 g/dL    RDW 12.7 11.5 - 14.5 %    Platelets 220 150 - 400 K/uL    MPV 10.8 8.9 - 12.9 FL    Nucleated RBCs 0.0 0  WBC    nRBC 0.00 0.00 - 0.01 K/uL    Neutrophils % 76 (H) 32 - 75 %    Lymphocytes % 13 12 - 49 %    Monocytes % 11 5 - 13 %    Eosinophils % 0 0 - 7 %    Basophils % 0 0 - 1 %    Immature Granulocytes % 0 0.0 - 0.5 %    Neutrophils Absolute 3.1 1.8 - 8.0 K/UL    Lymphocytes Absolute 0.5 (L) 0.8 - 3.5 K/UL    Monocytes Absolute 0.4 0.0 - 1.0 K/UL    Eosinophils Absolute 0.0 0.0 - 0.4 K/UL    Basophils Absolute 0.0 0.0 - 0.1 K/UL    Immature Granulocytes Absolute 0.0 0.00 - 0.04 K/UL    Differential Type SMEAR SCANNED      RBC Comment ANISOCYTOSIS  1+        WBC Comment SMUDGE CELLS SEEN     Troponin    Collection Time: 10/20/24  4:31 AM   Result Value Ref Range    Troponin, High Sensitivity 11 0 - 51 ng/L   Comprehensive Metabolic Panel    Collection Time: 10/20/24  4:31 AM   Result Value Ref Range    Sodium 141 136 - 145 mmol/L    Potassium 3.3 (L) 3.5 - 5.1 mmol/L     100 mL of nonionic IV Isovue-370 was administered for exam. 3D coronal and sagittal postprocessed images were performed for this examination. CT dose reduction was achieved through use of a standardized protocol tailored for this examination and automatic exposure control for dose modulation. Chest: CTPA: The pulmonary arteries are well opacified and there is no evidence of pulmonary embolism. Thoracic aorta is normal in course and caliber and there is no aortic dissection. Lymph nodes: There is no mediastinal or hilar lymphadenopathy. There are prominent and mildly enlarged bilateral axillary lymph nodes. Largest left axillary lymph node measures 2.4 cm x 1.6 cm. Largest right axillary lymph node is 1.7 cm x 1.2 cm. Heart: The heart is normal in the size and there is no pericardial effusion. Lungs/pleura: There are very small bilateral pleural effusions. There is bibasilar patchy disease and groundglass attenuation. Pleura: The pleural spaces are normal. Bones: No lytic or sclerotic osseous lesion is visualized. Thyroid gland: There is an incompletely visualized 3.1 cm right thyroid lobe nodule Upper abdomen: The visualized upper abdominal structures are normal.     1. No evidence of pulmonary embolism. 2. Very small bilateral pleural effusions. Bibasilar patchy airspace disease and bibasilar groundglass attenuation. 3. Prominent and mildly enlarged bilateral axillary lymph nodes, possibly reactive. Recommend short interval follow-up. Electronically signed by Connor Schmidt MD     _______________________________________________________________________    TOTAL TIME:  76 Minutes    Critical Care Provided     Minutes non procedure based    Signed: George Mcmahon MD    Procedures: see electronic medical records for all procedures/Xrays and details which were not copied into this note but were reviewed prior to creation of Plan.

## 2024-10-20 NOTE — ED NOTES
This RN walked the pt to the bathroom and upon a return to her room and beng hooked up to the monitors which showed the pt was sating at 88% oxygen and 120 heart rate. After a couple minutes of rest the pt's heart rate settled at to 86% and her oxygen went back up to 93% on room air. This RN made the MD aware.

## 2024-10-20 NOTE — ED PROVIDER NOTES
[facility] EMERGENCY DEPT  EMERGENCY DEPARTMENT ENCOUNTER       Pt Name: Kristin Doevr  MRN: [id number]  Birthdate 2000  Date of evaluation: 10/20/2024  Provider: Pa Russell MD   PCP: Meenakshi Brewer APRN - NP  Note Started: 7:42 AM 10/20/24     CHIEF COMPLAINT       Chief Complaint   Patient presents with    Shortness of Breath     Pt ambulatory into triage with complaints of shortness of breath x1 week, worse with exertion. Patient also reporting that intermittent chest pains with her shortness of breath, reports productive cough. Denies fevers or chills at home.         HISTORY OF PRESENT ILLNESS: 1 or more elements      History From: Patient, History limited by: No limitations     Kristin Dover is a 24 y.o. female with history of lupus, PE in 2022 no longer on anticoagulation who presents with chief complaint of shortness of breath.  Symptoms have been worsening over the past 1 week, described as dyspnea on exertion as well as shortness of breath at rest.  She endorses mild chest pain only with exertion.  Denies any leg pain or leg swelling.  Denies any alcohol or drug use.  She is no longer on anticoagulation over the past 3 months as directed by hematology for history of PE.     Nursing Notes were all reviewed and agreed with or any disagreements were addressed in the HPI.     REVIEW OF SYSTEMS        Positives and Pertinent negatives as per HPI.    PAST HISTORY     Past Medical History:  Past Medical History:   Diagnosis Date    Anxiety and depression     History of blood clots     Lupus (HCC)     Pneumonia 07/23/2023    and January 2023    Pneumonia 11/01/2023    Double       Past Surgical History:  Past Surgical History:   Procedure Laterality Date    CT BIOPSY RENAL  11/21/2022    CT BIOPSY RENAL 11/21/2022 [facility] RAD CT    WISDOM TOOTH EXTRACTION         Family History:  Family History   Problem Relation Age of Onset    No Known Problems Sister     No Known Problems Brother     Lung Cancer Maternal  negative for PE but consistent with bilateral pleural effusions and airspace disease.  She is hypertensive but normal troponin and proBNP, do not feel this is consistent with hypertensive cardiomyopathy, will cover with IV antibiotics.  Most recent blood pressure 160 systolic.  Will discuss with hospitalist for admission.    ED Course as of 10/20/24 0742   Sun Oct 20, 2024   0416 EKG per my interpretation normal sinus rhythm, rate 93 bpm, normal axis, no acute ischemic changes, no interval changes. [AK]      ED Course User Index  [AK] Pa Russell MD         Cardiac Monitoring:  The cardiac monitor revealed the following rhythm as interpreted by me: Normal Sinus Rhythm, rate 90 bpm  The cardiac monitor was ordered secondary to the patient's reported complaint of shortness of breath and to monitor the patient for dysrhythmia.  Pa Russell MD      FINAL IMPRESSION     1. Acute respiratory failure with hypoxia    2. Community acquired pneumonia, unspecified laterality          DISPOSITION/PLAN     Admission Note:  Patient is being admitted to the hospital by Dr. Mcmahon, Service: Hospitalist.  The results of their tests and reasons for their admission have been discussed with them and available family. They convey agreement and understanding for the need to be admitted and for their admission diagnosis.       CLINICAL IMPRESSION    1. Acute respiratory failure with hypoxia    2. Community acquired pneumonia, unspecified laterality         DISPOSITION  Admit     I am the Primary Clinician of Record.   Pa Russell MD (electronically signed)    (Please note that parts of this dictation were completed with voice recognition software. Quite often unanticipated grammatical, syntax, homophones, and other interpretive errors are inadvertently transcribed by the computer software. Please disregards these errors. Please excuse any errors that have escaped final proofreading.)       Pa Russell MD  10/20/24 0766

## 2024-10-20 NOTE — CONSULTS
Pulmonary, Critical Care, and Sleep Medicine~Consult Note    Name: Kristin Dover MRN: [id number]   : 2000 Hospital: [facility]   Date: 10/20/2024 5:02 PM Admission: 10/20/2024     Impression Plan   CT chest with bilateral lower lobe groundglass nodular infiltrates and bilateral pleural effusion improving from the study dated 2023.  SLE  Immunocompromise status  History of pulmonary embolism Agree with prophylactic antibiotics.  Check CRP, ESR and anti-dsDNA.  Check procalcitonin levels.  Consider trial of steroids if CRP elevated.  Echo report from [facility] office.    Thank you for the consult.  Will sign off to Dr. Martinez in the morning.     Pulmonary Consultation:    24-year-old female with past medical history significant for lupus, pulmonary embolism, pneumonia and hypothyroidism who presented to [facility] with complaints of increasing shortness of breath for the past 1 week with some dry cough.    Patient is followed by Dr. Martinez at [facility] office.  She is a lifetime non-smoker.  She was diagnosed with lupus in  on a kidney biopsy.  Her kidney functions are normal.  Her current medication for lupus includes prednisone 5 mg daily and mycophenolate 500 mg 2 tablet twice a day.  She is followed by Dr. Araya.  She has a history of pulmonary embolism 2 years back for which she took anticoagulation for almost 2 years stopped 1 month ago by Dr. Deras.    She denies chest pain or chest tightness.  She denies hemoptysis.  She denies fever or chills.  Her cough is improved.    She is currently on antibiotics -Zithromax and Rocephin.  Influenza A and influenza B including COVID-19 are negative.  Blood cultures are pending.    CT chest reviewed bilateral small pleural effusion.  Bilateral lower lobe groundglass and consolidation which has significantly, previous study dated 2023 when she was admitted to [facility]

## 2024-10-20 NOTE — PROGRESS NOTES
End of Shift Note    Bedside shift change report given to Trisha DANIELS (oncoming nurse) by Valerie Thompson RN (offgoing nurse).  Report included the following information SBAR, Kardex, and MAR    Shift worked:  [id number]     Shift summary and any significant changes:     Admission assessment and care done, blood works sent, hourly rounds done,all need attended     Concerns for physician to address:  none     Activity:     Number times ambulated in hallways past shift: 0  Number of times OOB to chair past shift: 3    Cardiac:   Cardiac Monitoring: Yes           Access:  Current line(s): PIV     Genitourinary:   Urinary status: voiding    Respiratory:      Chronic home O2 use?: NO  Incentive spirometer at bedside: NO       GI:     Current diet:  ADULT DIET; Regular; Low Fat/Low Chol/High Fiber/MARIAMA  Passing flatus: YES  Tolerating current diet: YES       Pain Management:   Patient states pain is manageable on current regimen: YES    Skin:     Interventions: nutritional support    Patient Safety:  Fall Score:    Interventions: gripper socks       Length of Stay:  Expected LOS: 3  Actual LOS: 0      Valerie Thompson RN

## 2024-10-21 LAB
ANION GAP SERPL CALC-SCNC: 6 MMOL/L (ref 2–12)
BUN SERPL-MCNC: 10 MG/DL (ref 6–20)
BUN/CREAT SERPL: 16 (ref 12–20)
CALCIUM SERPL-MCNC: 8.3 MG/DL (ref 8.5–10.1)
CHLORIDE SERPL-SCNC: 111 MMOL/L (ref 97–108)
CO2 SERPL-SCNC: 25 MMOL/L (ref 21–32)
CREAT SERPL-MCNC: 0.64 MG/DL (ref 0.55–1.02)
EKG ATRIAL RATE: 93 BPM
EKG DIAGNOSIS: NORMAL
EKG P AXIS: 50 DEGREES
EKG P-R INTERVAL: 176 MS
EKG Q-T INTERVAL: 386 MS
EKG QRS DURATION: 80 MS
EKG QTC CALCULATION (BAZETT): 479 MS
EKG R AXIS: 7 DEGREES
EKG T AXIS: 57 DEGREES
EKG VENTRICULAR RATE: 93 BPM
ERYTHROCYTE [DISTWIDTH] IN BLOOD BY AUTOMATED COUNT: 12.7 % (ref 11.5–14.5)
GLUCOSE SERPL-MCNC: 96 MG/DL (ref 65–100)
HCT VFR BLD AUTO: 29.3 % (ref 35–47)
HGB BLD-MCNC: 9.7 G/DL (ref 11.5–16)
MAGNESIUM SERPL-MCNC: 1.6 MG/DL (ref 1.6–2.4)
MCH RBC QN AUTO: 29.1 PG (ref 26–34)
MCHC RBC AUTO-ENTMCNC: 33.1 G/DL (ref 30–36.5)
MCV RBC AUTO: 88 FL (ref 80–99)
NRBC # BLD: 0 K/UL (ref 0–0.01)
NRBC BLD-RTO: 0 PER 100 WBC
PLATELET # BLD AUTO: 208 K/UL (ref 150–400)
PMV BLD AUTO: 10.6 FL (ref 8.9–12.9)
POTASSIUM SERPL-SCNC: 3.3 MMOL/L (ref 3.5–5.1)
PROCALCITONIN SERPL-MCNC: <0.05 NG/ML
RBC # BLD AUTO: 3.33 M/UL (ref 3.8–5.2)
SODIUM SERPL-SCNC: 142 MMOL/L (ref 136–145)
WBC # BLD AUTO: 3.3 K/UL (ref 3.6–11)

## 2024-10-21 PROCEDURE — 36415 COLL VENOUS BLD VENIPUNCTURE: CPT

## 2024-10-21 PROCEDURE — 83735 ASSAY OF MAGNESIUM: CPT

## 2024-10-21 PROCEDURE — 6370000000 HC RX 637 (ALT 250 FOR IP): Performed by: INTERNAL MEDICINE

## 2024-10-21 PROCEDURE — 80048 BASIC METABOLIC PNL TOTAL CA: CPT

## 2024-10-21 PROCEDURE — 84145 PROCALCITONIN (PCT): CPT

## 2024-10-21 PROCEDURE — 2580000003 HC RX 258: Performed by: INTERNAL MEDICINE

## 2024-10-21 PROCEDURE — 1100000003 HC PRIVATE W/ TELEMETRY

## 2024-10-21 PROCEDURE — 85027 COMPLETE CBC AUTOMATED: CPT

## 2024-10-21 PROCEDURE — 6360000002 HC RX W HCPCS: Performed by: INTERNAL MEDICINE

## 2024-10-21 RX ORDER — BUMETANIDE 1 MG/1
2 TABLET ORAL DAILY
Status: DISCONTINUED | OUTPATIENT
Start: 2024-10-22 | End: 2024-10-22 | Stop reason: HOSPADM

## 2024-10-21 RX ORDER — POTASSIUM CHLORIDE 1.5 G/1.58G
20 POWDER, FOR SOLUTION ORAL ONCE
Status: COMPLETED | OUTPATIENT
Start: 2024-10-21 | End: 2024-10-21

## 2024-10-21 RX ORDER — FUROSEMIDE 10 MG/ML
40 INJECTION INTRAMUSCULAR; INTRAVENOUS ONCE
Status: COMPLETED | OUTPATIENT
Start: 2024-10-21 | End: 2024-10-21

## 2024-10-21 RX ADMIN — ENOXAPARIN SODIUM 30 MG: 100 INJECTION SUBCUTANEOUS at 20:56

## 2024-10-21 RX ADMIN — WATER 1000 MG: 1 INJECTION INTRAMUSCULAR; INTRAVENOUS; SUBCUTANEOUS at 06:48

## 2024-10-21 RX ADMIN — FUROSEMIDE 40 MG: 10 INJECTION, SOLUTION INTRAMUSCULAR; INTRAVENOUS at 12:42

## 2024-10-21 RX ADMIN — ENOXAPARIN SODIUM 30 MG: 100 INJECTION SUBCUTANEOUS at 09:12

## 2024-10-21 RX ADMIN — HYDROXYCHLOROQUINE SULFATE 400 MG: 200 TABLET ORAL at 09:09

## 2024-10-21 RX ADMIN — MYCOPHENOLATE MOFETIL 1000 MG: 250 CAPSULE ORAL at 20:57

## 2024-10-21 RX ADMIN — AZITHROMYCIN DIHYDRATE 500 MG: 500 INJECTION, POWDER, LYOPHILIZED, FOR SOLUTION INTRAVENOUS at 11:10

## 2024-10-21 RX ADMIN — POTASSIUM CHLORIDE 20 MEQ: 1.5 FOR SOLUTION ORAL at 09:08

## 2024-10-21 RX ADMIN — SODIUM CHLORIDE, PRESERVATIVE FREE 10 ML: 5 INJECTION INTRAVENOUS at 20:59

## 2024-10-21 RX ADMIN — MYCOPHENOLATE MOFETIL 1000 MG: 250 CAPSULE ORAL at 09:09

## 2024-10-21 RX ADMIN — LEVOTHYROXINE SODIUM 50 MCG: 0.05 TABLET ORAL at 10:58

## 2024-10-21 RX ADMIN — PREDNISONE 5 MG: 5 TABLET ORAL at 09:11

## 2024-10-21 RX ADMIN — SODIUM CHLORIDE, PRESERVATIVE FREE 10 ML: 5 INJECTION INTRAVENOUS at 09:12

## 2024-10-21 RX ADMIN — LOSARTAN POTASSIUM 50 MG: 25 TABLET, FILM COATED ORAL at 09:09

## 2024-10-21 NOTE — PROGRESS NOTES
Pulmonary, Critical Care, and Sleep Medicine~Consult Note    Name: Kristin Dover MRN: [id number]   : 2000 Hospital: [facility]   Date: 10/21/2024 2:03 PM Admission: 10/20/2024     Impression Plan   CT chest with bilateral lower lobe groundglass nodular infiltrates and bilateral pleural effusion improving from the study dated 2023.  SLE  Immunocompromise status  History of pulmonary embolism Agree with prophylactic antibiotics.  Exercise oximetry prior to DC  CRP 0.61, ESR 58. F/u anti-dsDNA.  Prn diuresis  Echo report from S office.       Pulmonary Consultation:    10/21: feels great. Denies SOB/BALDERRAMA, cough. Hoping to discharge home    24-year-old female with past medical history significant for lupus, pulmonary embolism, pneumonia and hypothyroidism who presented to [facility] with complaints of increasing shortness of breath for the past 1 week with some dry cough.    Patient is followed by Dr. Martinez at [facility] office.  She is a lifetime non-smoker.  She was diagnosed with lupus in  on a kidney biopsy.  Her kidney functions are normal.  Her current medication for lupus includes prednisone 5 mg daily and mycophenolate 500 mg 2 tablet twice a day.  She is followed by Dr. Araya.  She has a history of pulmonary embolism 2 years back for which she took anticoagulation for almost 2 years stopped 1 month ago by Dr. Deras.    She denies chest pain or chest tightness.  She denies hemoptysis.  She denies fever or chills.  Her cough is improved.    She is currently on antibiotics -Zithromax and Rocephin.  Influenza A and influenza B including COVID-19 are negative.  Blood cultures are pending.    CT chest reviewed bilateral small pleural effusion.  Bilateral lower lobe groundglass and consolidation which has significantly, previous study dated 2023 when she was admitted to [facility] with pneumonia.      I have

## 2024-10-21 NOTE — PROGRESS NOTES
End of Shift Note    Bedside shift change report given to Augustina (oncoming nurse) by Kellee Arias RN (offgoing nurse).  Report included the following information SBAR, Intake/Output, MAR, Recent Results, and Med Rec Status    Shift worked:  7A-7P     Shift summary and any significant changes:     Pt AOX4, able to make needs known, VSS. Pt had x1 IV lasix, output charted in flowsheet. Ambulates with steady gait. Denied pain and discomfort.      Concerns for physician to address:       Zone phone for oncoming shift:          Activity:     Number times ambulated in hallways past shift: 5  Number of times OOB to chair past shift: 0    Cardiac:   Cardiac Monitoring: Yes           Access:  Current line(s): PIV     Genitourinary:   Urinary status: voiding    Respiratory:      Chronic home O2 use?: NO  Incentive spirometer at bedside: NO       GI:     Current diet:  ADULT DIET; Regular; Low Fat/Low Chol/High Fiber/MARIAMA  Passing flatus: YES  Tolerating current diet: YES       Pain Management:   Patient states pain is manageable on current regimen: YES    Skin:     Interventions: turn team, float heels, increase time out of bed, and limit briefs    Patient Safety:  Fall Score:    Interventions: bed/chair alarm       Length of Stay:  Expected LOS: 3  Actual LOS: 1      Kellee Arias RN

## 2024-10-21 NOTE — CARE COORDINATION
Care Management Initial Assessment       RUR: 8%  Readmission? No  1st IM letter given? No-[place] [employer]   1st  letter given: No        CM reviewed pt's chart prior to completing initial assessment.    CM introduce self, explain role and confirmed demographics with pt.    Pt lives with her family in an apartment complex on the second floor with 15 steps to get to the apartment.    No hx of home health, SNF or inpatient rehab.    Pt uses [facility] in [place].    At the time of d/c pt's family will transport.    CM will follow and assist with d/c planning.       10/21/24 1143   Service Assessment   Patient Orientation Alert and Oriented   Cognition Alert   History Provided By Patient   Primary Caregiver Self   Support Systems Family Members;Parent   Patient's Healthcare Decision Maker is: Legal Next of Kin  (Pt's parent Kirsty Deluca)   PCP Verified by CM Yes   Last Visit to PCP Within last 6 months   Prior Functional Level Independent in ADLs/IADLs   Current Functional Level Independent in ADLs/IADLs   Can patient return to prior living arrangement Yes   Family able to assist with home care needs: Yes   Would you like for me to discuss the discharge plan with any other family members/significant others, and if so, who? No   Financial Resources Other (Comment)  (BCBS)   Community Resources None   Social/Functional History   Lives With Family   Type of Home Apartment   Home Equipment None   Active  Yes   Discharge Planning   Type of Residence Apartment   Current Services Prior To Admission None   Patient expects to be discharged to: Apartment     Advance Care Planning     General Advance Care Planning (ACP) Conversation    Date of Conversation: 10/21/2024  Conducted with: Patient with Decision Making Capacity  Other persons present: None    Healthcare Decision Maker:   Primary Decision Maker: Kirsty Deluca - Parent - [phone]       Content/Action Overview:  DECLINED ACP Conversation - will

## 2024-10-21 NOTE — PROGRESS NOTES
End of Shift Note    Bedside shift change report given to FRANCES Goodson (oncoming nurse) by LAUREN SILVERIO RN (offgoing nurse).  Report included the following information SBAR, Kardex, and MAR    Shift worked:  7p-7a     Shift summary and any significant changes:     AAOx4, VSS, no complaint of pain, labs drawn and sent.     Concerns for physician to address:  Potassium 3.3     Zone phone for oncoming shift:   no       Activity:  Level of Assistance: Independent    Cardiac:   Cardiac Monitoring:  yes     Access:  Current line(s): PIV     Genitourinary:   Urinary Status: Voiding    Respiratory:   O2 Device: None (Room air)    GI:  Current diet: ADULT DIET; Regular; Low Fat/Low Chol/High Fiber/MARIAMA    Pain Management:   Patient states pain is manageable on current regimen: N/A    Skin:  Kash Scale Score: 23  Interventions: Wound Offloading (Prevention Methods): Repositioning  Pressure injury: no    Patient Safety:  Fall Score: [place] Total Score: 20  Fall Risk Interventions  Nursing Judgement-Fall Risk High(Add Comments): No  Toilet Every 2 Hours-In Advance of Need: Yes  Hourly Visual Checks: Awake, In bed  Fall Visual Posted: Armband, Socks  Room Door Open: Deferred to promote rest  Alarm On: Bed  Patient Moved Closer to Nursing Station: No    Active Consults:  IP CONSULT TO PULMONOLOGY    Length of Stay:  Expected LOS: 3  Actual LOS: 1      LAUREN SILVERIO RN

## 2024-10-21 NOTE — PROGRESS NOTES
Hospitalist Progress Note    NAME:   Kristin Dover   : 2000   MRN: [id number]     Date/Time: 10/21/2024 12:29 PM  Patient PCP: Meenakshi Brewer APRN - [facility]    Estimated discharge date: 10/22  Barriers: o2 challenge tomorrow      Assessment / Plan:    Bibasilar airspace disease  Hypoxia with ambulation  Morbid obesity  Lupus     -1 week history of short of breath, 86%-87% on room air with ambulation  -CT chest showed very small bilateral pleural effusion, bibasilar patchy airspace disease and bibasilar groundglass attenuation  -WBC normal, no fever, low likelihood of PNA, though empiric antibiotics for now  -cont diuretics IV  -Patient follows with pulmonary as outpatient, Dr. Martinez, for pneumonia.    -had recent echo as OP with cardiology  -Follow-up blood culture     Hypothyroidism, continue levothyroxine     HTN, continue losartan                             Medical Decision Making:   I personally reviewed labs: CBC/BMP  I personally reviewed imaging: CT chest  I personally reviewed EKG: NSR  Toxic drug monitoring:   Discussed case with: rn,pt        Code Status: Full code  DVT Prophylaxis: Lovenox  Baseline:     Subjective:     Chief Complaint / Reason for Physician Visit  \"Reports sob has improved\".  Discussed with RN events overnight.       Objective:     VITALS:   Last 24hrs VS reviewed since prior progress note. Most recent are:  Patient Vitals for the past 24 hrs:   BP Temp Temp src Pulse Resp SpO2   10/21/24 0906 (!) 145/89 -- Oral 88 18 95 %   10/21/24 0904 -- -- -- 88 -- 96 %   10/21/24 0248 (!) 141/83 97.9 °F (36.6 °C) -- 91 -- 95 %   10/20/24 2030 (!) 166/90 98.4 °F (36.9 °C) Oral 98 22 95 %   10/20/24 1504 (!) 154/100 98.1 °F (36.7 °C) Oral 95 -- 93 %         Intake/Output Summary (Last 24 hours) at 10/21/2024 1229  Last data filed at 10/20/2024 2030  Gross per 24 hour   Intake 400 ml   Output --   Net 400 ml        I had a face to face encounter and independently examined this patient on

## 2024-10-22 VITALS
WEIGHT: 293 LBS | BODY MASS INDEX: 50.02 KG/M2 | HEART RATE: 89 BPM | TEMPERATURE: 98.8 F | OXYGEN SATURATION: 97 % | SYSTOLIC BLOOD PRESSURE: 151 MMHG | RESPIRATION RATE: 14 BRPM | HEIGHT: 64 IN | DIASTOLIC BLOOD PRESSURE: 110 MMHG

## 2024-10-22 PROCEDURE — 6360000002 HC RX W HCPCS: Performed by: INTERNAL MEDICINE

## 2024-10-22 PROCEDURE — 2580000003 HC RX 258: Performed by: INTERNAL MEDICINE

## 2024-10-22 PROCEDURE — 6370000000 HC RX 637 (ALT 250 FOR IP): Performed by: INTERNAL MEDICINE

## 2024-10-22 RX ORDER — AZITHROMYCIN 500 MG/1
500 TABLET, FILM COATED ORAL DAILY
Qty: 3 TABLET | Refills: 0 | Status: SHIPPED | OUTPATIENT
Start: 2024-10-22 | End: 2024-10-25

## 2024-10-22 RX ORDER — POTASSIUM CHLORIDE 1500 MG/1
20 TABLET, EXTENDED RELEASE ORAL DAILY
Qty: 30 TABLET | Refills: 0 | Status: SHIPPED | OUTPATIENT
Start: 2024-10-22 | End: 2024-11-21

## 2024-10-22 RX ORDER — BUMETANIDE 1 MG/1
2 TABLET ORAL DAILY
Qty: 60 TABLET | Refills: 0 | Status: SHIPPED | OUTPATIENT
Start: 2024-10-22 | End: 2024-11-21

## 2024-10-22 RX ADMIN — LOSARTAN POTASSIUM 50 MG: 25 TABLET, FILM COATED ORAL at 08:58

## 2024-10-22 RX ADMIN — PREDNISONE 5 MG: 5 TABLET ORAL at 08:58

## 2024-10-22 RX ADMIN — HYDROXYCHLOROQUINE SULFATE 400 MG: 200 TABLET ORAL at 08:58

## 2024-10-22 RX ADMIN — WATER 1000 MG: 1 INJECTION INTRAMUSCULAR; INTRAVENOUS; SUBCUTANEOUS at 05:58

## 2024-10-22 RX ADMIN — ENOXAPARIN SODIUM 30 MG: 100 INJECTION SUBCUTANEOUS at 08:58

## 2024-10-22 RX ADMIN — SODIUM CHLORIDE, PRESERVATIVE FREE 10 ML: 5 INJECTION INTRAVENOUS at 08:58

## 2024-10-22 RX ADMIN — BUMETANIDE 2 MG: 1 TABLET ORAL at 08:58

## 2024-10-22 RX ADMIN — AZITHROMYCIN DIHYDRATE 500 MG: 500 INJECTION, POWDER, LYOPHILIZED, FOR SOLUTION INTRAVENOUS at 06:10

## 2024-10-22 RX ADMIN — MYCOPHENOLATE MOFETIL 1000 MG: 250 CAPSULE ORAL at 08:57

## 2024-10-22 RX ADMIN — LEVOTHYROXINE SODIUM 50 MCG: 0.05 TABLET ORAL at 08:58

## 2024-10-22 ASSESSMENT — PAIN SCALES - GENERAL: PAINLEVEL_OUTOF10: 0

## 2024-10-22 NOTE — DISCHARGE INSTRUCTIONS
HOSPITALIST DISCHARGE INSTRUCTIONS    NAME: Kristin Dover   :  2000   MRN:  [id number]     Date/Time:  10/22/2024 11:54 AM    ADMIT DATE: 10/20/2024   DISCHARGE DATE: 10/22/2024     Bibasilar airspace disease  Hypoxia with ambulation  Morbid obesity  Lupus  Hypothyroidism,   HTN,    It is important that you take the medication exactly as they are prescribed.   Keep your medication in the bottles provided by the pharmacist and keep a list of the medication names, dosages, and times to be taken in your wallet.   Do not take other medications without consulting your doctor.       What to do at Home    Recommended diet:  cardiac diet    Recommended activity: activity as tolerated      If you have questions regarding the hospital related prescriptions or hospital related issues please call [facility]' office at . You can always direct your questions to your primary care doctor if you are unable to reach your hospital physician; your PCP works as an extension of your hospital doctor just like your hospital doctor is an extension of your PCP for your time at the hospital ([facility])    If you experience any of the following symptoms then please call your primary care physician or return to the emergency room if you cannot get hold of your doctor:    Fever, chills, nausea, vomiting, or persistent diarrhea  Worsening weakness or new problems with your speech or balance  Dark stools or visible blood in your stools  New Leg swelling or shortness of breath as these could be signs of a clot    Additional Instructions:      Bring these papers with you to your follow up appointments. The papers will help your doctors be sure to continue the care plan from the hospital.              Information obtained by :  I understand that if any problems occur once I am at home I am to contact my physician.    I understand and acknowledge receipt of the instructions indicated above.                                                                                                                                            Physician's or R.N.'s Signature                                                                  Date/Time                                                                                                                                              Patient or Representative Signature

## 2024-10-22 NOTE — PROGRESS NOTES
End of Shift Note    Bedside shift change report given to Emily DANIELS (oncoming nurse) by Augustina Mitchell RN (offgoing nurse).  Report included the following information SBAR, Kardex, MAR, and Cardiac Rhythm NSR    Shift worked: Night   Shift summary and any significant changes:    Up to the bathrm as needed. Had a large BMs mixed with urine and not possible to do accurate I & O.No Lab order this morning. Morning A/Biotics given. Runs an elevated BP   Concerns for physician to address: High BP as documented         Augustina Mitchell RN

## 2024-10-22 NOTE — CARE COORDINATION
Pt is clear from CM standpoint for d/c.    Pt's significant other will transport.    Transition of Care Plan:    RUR: 9%  Prior Level of Functioning: Independent   Disposition: Home with family   If SNF or IPR: Date FOC offered:   Date FOC received:   Accepting facility:   Date authorization started with reference number:   Date authorization received and expires:   Follow up appointments: PCP   DME needed: No DME needed   Transportation at discharge: Pt's significant other   IM/IMM Medicare/ letter given: Lisa PARKER   Is patient a  and connected with [facility]? No   If yes, was [facility] transfer form completed and [facility] notified? No  Caregiver Contact: Pt's mother   Discharge Caregiver contacted prior to discharge? Pt was contacted   Care Conference needed? No  Barriers to discharge: None              10/22/24 1217   Services At/After Discharge   Transition of Care Consult (CM Consult) N/A   Services At/After Discharge None   [facility] Resource Information Provided? No   Mode of Transport at Discharge Self   Confirm Follow Up Transport Self   Condition of Participation: Discharge Planning   The Plan for Transition of Care is related to the following treatment goals: Goal is to return home with follow up appointments   The Patient and/or Patient Representative was provided with a Choice of Provider? Patient   The Patient and/Or Patient Representative agree with the Discharge Plan? Yes   Freedom of Choice list was provided with basic dialogue that supports the patient's individualized plan of care/goals, treatment preferences, and shares the quality data associated with the providers?  Yes     Piedad Nick

## 2024-10-22 NOTE — PROGRESS NOTES
Patient determined to be stable for discharge by attending provider. I have reviewed the discharge instructions and follow-up appointments with the patient. They verbalized understanding and all questions were answered to their satisfaction. No complaints or further questions were expressed.       New medications: Appropriate educational materials and medication side effect teaching were provided.       PIV and telemetry monitoring were removed prior to discharge.     All personal items collected during admission were returned to the patient prior to discharge.    Magnolia Dawson RN

## 2024-10-22 NOTE — DISCHARGE SUMMARY
Discharge Summary    Name: Kristin Dover  [id number]  YOB: 2000 (Age: 24 y.o.)   Date of Admission: 10/20/2024  Date of Discharge: 10/22/2024  Attending Physician: George Mcmahon MD    Discharge Diagnosis:   Bibasilar airspace disease  Hypoxia with ambulation  Morbid obesity  Lupus  Hypothyroidism,   HTN,                          Consultations:  IP CONSULT TO PULMONOLOGY      Brief Admission History/Reason for Admission Per George Mcmahon MD:   Kristin Dover is a 24 y.o.  female with PMHx significant for anxiety, depression, lupus who presented to ED with a chief complaint of shortness of breath which has been going on for past 1 week.  She reports that for 1 week she does short of breath with minimal activity and even at rest now.  Denies any weight gain.  She has a history of lupus and takes mycophenolate.  She also takes prednisone 5 mg daily which she has been compliant.  In the ED she was saturating 87 on percent on ambulation, so we were asked to admit for further workup currently she is on room air saturating 94%  We were asked to admit for work up and evaluation of the above problems.        Brief Hospital Course by Main Problems:   Bibasilar airspace disease  Hypoxia with ambulation  Morbid obesity  Lupus     -1 week history of short of breath, 86%-87% on room air with ambulation  -CT chest showed very small bilateral pleural effusion, bibasilar patchy airspace disease and bibasilar groundglass attenuation  -WBC normal, no fever, low likelihood of PNA, though empiric antibiotics for now  -Echo was done July 2024, normal EF, normal RV  Seen by pulmonary physician, recommends azithromycin  Continue Bumex at home dose which is 2 mg daily, continue KCl supplements along with diet  Patient did well with oxygen challenge     Hypothyroidism, continue levothyroxine     HTN, continue losartan                              Discharge Exam:  Patient seen and examined by

## 2024-10-22 NOTE — PROGRESS NOTES
Pulse oximetry assessment   95% at rest on room air (if 88% or less, skip next steps)  94% while ambulating on room air

## 2024-10-22 NOTE — PROGRESS NOTES
Pulmonary, Critical Care, and Sleep Medicine~Consult Note    Name: Kristin Dover MRN: [id number]   : 2000 Hospital: [facility]   Date: 10/22/2024 11:31 AM Admission: 10/20/2024     Impression Plan   CT chest with bilateral lower lobe groundglass nodular infiltrates and bilateral pleural effusion improving from the study dated 2023.  SLE  Immunocompromise status  History of pulmonary embolism Agree with prophylactic antibiotics. Would complete a 5 day course of azithro  CRP 0.61, ESR 58. anti-dsDNA 27.  Prn diuresis  Echo report from [facility] office.  Ok for discharge from a pulmonary standpoint. She has an appointment with Dr. Martinez on Friday.  Will sign off       Pulmonary Consultation:    10/22: no overnight events. Breathing is at her baseline. No complaints. Says she didn't require oxygen on exercise oximetry today    10/21: feels great. Denies SOB/BALDERRAMA, cough. Hoping to discharge home    24-year-old female with past medical history significant for lupus, pulmonary embolism, pneumonia and hypothyroidism who presented to [facility] with complaints of increasing shortness of breath for the past 1 week with some dry cough.    Patient is followed by Dr. Martinez at [facility] office.  She is a lifetime non-smoker.  She was diagnosed with lupus in  on a kidney biopsy.  Her kidney functions are normal.  Her current medication for lupus includes prednisone 5 mg daily and mycophenolate 500 mg 2 tablet twice a day.  She is followed by Dr. Araya.  She has a history of pulmonary embolism 2 years back for which she took anticoagulation for almost 2 years stopped 1 month ago by Dr. Deras.    She denies chest pain or chest tightness.  She denies hemoptysis.  She denies fever or chills.  Her cough is improved.    She is currently on antibiotics -Zithromax and Rocephin.  Influenza A and influenza B including COVID-19 are negative.  Blood cultures are

## 2024-10-23 LAB
DSDNA AB SER-ACNC: 27 IU/ML (ref 0–9)
HISTONE IGG SER IA-ACNC: 1.2 UNITS (ref 0–0.9)

## 2024-10-24 ENCOUNTER — TELEPHONE (OUTPATIENT)
Age: 24
End: 2024-10-24

## 2024-10-24 NOTE — TELEPHONE ENCOUNTER
Please call Kristin Dover . Pt was discharged from hospital.  10-22-24 This is a [facility] follow up      Call back number is [phone]

## 2024-10-24 NOTE — TELEPHONE ENCOUNTER
Care Transitions Initial Follow Up Call    Outreach made within 2 business days of discharge: Yes    Patient: Kristin Dover Patient : 2000   MRN: [id number]  Reason for Admission: congestion/coughing  Discharge Date: 10/22/24       Spoke with:Patient    Discharge department/facility: [facility] Interactive Patient Contact:  Was patient able to fill all prescriptions: Yes  Was patient instructed to bring all medications to the follow-up visit: Yes  Is patient taking all medications as directed in the discharge summary? Yes  Does patient understand their discharge instructions: Yes  Does patient have questions or concerns that need addressed prior to 7-14 day follow up office visit: yes - Yes    Additional needs identified to be addressed with provider               Scheduled appointment with PCP within 7-14 days    Follow Up  Future Appointments   Date Time Provider Department Center   10/25/2024 12:00 PM [facility] [address] [facility]   2025 10:50 AM Tigre Painter MD RDE [facility] [address] AMB       Jenelle Ocampo MA

## 2024-10-25 ENCOUNTER — HOSPITAL ENCOUNTER (OUTPATIENT)
Facility: HOSPITAL | Age: 24
Discharge: HOME | End: 2024-10-28
Attending: INTERNAL MEDICINE
Payer: COMMERCIAL

## 2024-10-25 DIAGNOSIS — E04.2 NONTOXIC MULTINODULAR GOITER: ICD-10-CM

## 2024-10-25 LAB
BACTERIA SPEC CULT: NORMAL
BACTERIA SPEC CULT: NORMAL
SERVICE CMNT-IMP: NORMAL
SERVICE CMNT-IMP: NORMAL

## 2024-10-25 PROCEDURE — 76536 US EXAM OF HEAD AND NECK: CPT

## 2024-11-04 ENCOUNTER — OFFICE VISIT (OUTPATIENT)
Age: 24
End: 2024-11-04

## 2024-11-04 VITALS
DIASTOLIC BLOOD PRESSURE: 72 MMHG | WEIGHT: 293 LBS | HEART RATE: 97 BPM | BODY MASS INDEX: 53.25 KG/M2 | TEMPERATURE: 97.9 F | OXYGEN SATURATION: 99 % | RESPIRATION RATE: 20 BRPM | SYSTOLIC BLOOD PRESSURE: 144 MMHG

## 2024-11-04 DIAGNOSIS — E87.6 HYPOKALEMIA: ICD-10-CM

## 2024-11-04 DIAGNOSIS — Z09 HOSPITAL DISCHARGE FOLLOW-UP: Primary | ICD-10-CM

## 2024-11-04 DIAGNOSIS — M32.9 SLE (SYSTEMIC LUPUS ERYTHEMATOSUS RELATED SYNDROME) (HCC): ICD-10-CM

## 2024-11-04 DIAGNOSIS — R59.0 ENLARGED LYMPH NODES IN ARMPIT: ICD-10-CM

## 2024-11-04 DIAGNOSIS — Z23 NEEDS FLU SHOT: ICD-10-CM

## 2024-11-04 DIAGNOSIS — Z87.01 HX: RECURRENT PNEUMONIA: ICD-10-CM

## 2024-11-04 NOTE — PROGRESS NOTES
\"Have you been to the ER, urgent care clinic since your last visit?  Hospitalized since your last visit?\"    NO    “Have you seen or consulted any other health care providers outside our system since your last visit?”    PFT 10/25/24 Dr Martinez ([place] [place] Pulmonologist      “Have you had a pap smear?”    NO    No cervical cancer screening on file     Chief Complaint   Patient presents with    Follow-Up from Hospital     Pneumonia        Vitals:    11/04/24 1044   BP: (!) 144/72   Pulse: 97   Resp: 20   Temp: 97.9 °F (36.6 °C)   SpO2: 99%            Patient was administered vaccine Flu in left deltoid via IM.  Patient tolerated Flu shot well.  Medication information reviewed with patient, patient states understanding. Patient to resume routine medications at home.  Patient given copy of AVS and VIIS with medication information and instructions for home. VIIS reviewed with patient and patient states understanding.      Labs drawn from right arm per Meenakshi Brewer NP's orders. Patient tolerated well.

## 2024-11-05 LAB
ANION GAP SERPL CALC-SCNC: 12 MMOL/L (ref 2–12)
BUN SERPL-MCNC: 11 MG/DL (ref 6–20)
BUN/CREAT SERPL: 14 (ref 12–20)
CALCIUM SERPL-MCNC: 9.1 MG/DL (ref 8.5–10.1)
CHLORIDE SERPL-SCNC: 106 MMOL/L (ref 97–108)
CO2 SERPL-SCNC: 22 MMOL/L (ref 21–32)
CREAT SERPL-MCNC: 0.81 MG/DL (ref 0.55–1.02)
GLUCOSE SERPL-MCNC: 83 MG/DL (ref 65–100)
POTASSIUM SERPL-SCNC: 3.6 MMOL/L (ref 3.5–5.1)
SODIUM SERPL-SCNC: 140 MMOL/L (ref 136–145)

## 2024-11-06 LAB — MAGNESIUM: 1.5 MG/DL (ref 1.6–2.3)

## 2024-11-08 DIAGNOSIS — E87.6 HYPOKALEMIA: ICD-10-CM

## 2024-11-08 RX ORDER — POTASSIUM CHLORIDE 1500 MG/1
20 TABLET, EXTENDED RELEASE ORAL DAILY
Qty: 90 TABLET | Refills: 1 | Status: SHIPPED | OUTPATIENT
Start: 2024-11-08

## 2024-11-10 PROBLEM — I26.99 BILATERAL PULMONARY EMBOLISM (HCC): Status: RESOLVED | Noted: 2022-11-12 | Resolved: 2024-11-10

## 2024-11-10 PROBLEM — R09.02 HYPOXIA: Status: RESOLVED | Noted: 2023-11-01 | Resolved: 2024-11-10

## 2024-11-10 PROBLEM — J18.9 COMMUNITY ACQUIRED PNEUMONIA OF RIGHT LUNG, UNSPECIFIED PART OF LUNG: Status: RESOLVED | Noted: 2023-07-23 | Resolved: 2024-11-10

## 2024-11-10 PROBLEM — R06.02 SHORTNESS OF BREATH: Status: RESOLVED | Noted: 2024-10-20 | Resolved: 2024-11-10

## 2025-01-03 DIAGNOSIS — E55.9 VITAMIN D DEFICIENCY: ICD-10-CM

## 2025-01-03 RX ORDER — ERGOCALCIFEROL 1.25 MG/1
50000 CAPSULE, LIQUID FILLED ORAL WEEKLY
Qty: 12 CAPSULE | Refills: 0 | OUTPATIENT
Start: 2025-01-03

## 2025-01-03 RX ORDER — LEVOTHYROXINE SODIUM 50 UG/1
50 TABLET ORAL DAILY
Qty: 90 TABLET | Refills: 0 | Status: SHIPPED | OUTPATIENT
Start: 2025-01-03

## 2025-01-05 DIAGNOSIS — E55.9 VITAMIN D DEFICIENCY: ICD-10-CM

## 2025-01-06 RX ORDER — ERGOCALCIFEROL 1.25 MG/1
50000 CAPSULE, LIQUID FILLED ORAL WEEKLY
Qty: 12 CAPSULE | Refills: 0 | Status: SHIPPED | OUTPATIENT
Start: 2025-01-06

## 2025-03-03 DIAGNOSIS — E04.2 NONTOXIC MULTINODULAR GOITER: ICD-10-CM

## 2025-03-03 DIAGNOSIS — E03.9 ACQUIRED HYPOTHYROIDISM: Primary | ICD-10-CM

## 2025-04-07 DIAGNOSIS — E55.9 VITAMIN D DEFICIENCY: ICD-10-CM

## 2025-04-07 RX ORDER — ERGOCALCIFEROL 1.25 MG/1
50000 CAPSULE, LIQUID FILLED ORAL WEEKLY
Qty: 12 CAPSULE | Refills: 0 | Status: SHIPPED | OUTPATIENT
Start: 2025-04-07

## 2025-04-11 RX ORDER — LEVOTHYROXINE SODIUM 50 UG/1
50 TABLET ORAL DAILY
Qty: 90 TABLET | Refills: 1 | Status: SHIPPED | OUTPATIENT
Start: 2025-04-11

## 2025-05-02 ENCOUNTER — TRANSCRIBE ORDERS (OUTPATIENT)
Facility: HOSPITAL | Age: 25
End: 2025-05-02

## 2025-05-02 ENCOUNTER — HOSPITAL ENCOUNTER (OUTPATIENT)
Facility: HOSPITAL | Age: 25
Discharge: HOME | End: 2025-05-02
Payer: COMMERCIAL

## 2025-05-02 DIAGNOSIS — R76.12 POSITIVE QUANTIFERON-TB GOLD TEST: Primary | ICD-10-CM

## 2025-05-02 DIAGNOSIS — R76.12 POSITIVE QUANTIFERON-TB GOLD TEST: ICD-10-CM

## 2025-05-02 PROCEDURE — 71046 X-RAY EXAM CHEST 2 VIEWS: CPT

## 2025-07-07 DIAGNOSIS — E55.9 VITAMIN D DEFICIENCY: ICD-10-CM

## 2025-07-07 RX ORDER — ERGOCALCIFEROL 1.25 MG/1
50000 CAPSULE, LIQUID FILLED ORAL WEEKLY
Qty: 12 CAPSULE | Refills: 0 | Status: SHIPPED | OUTPATIENT
Start: 2025-07-07

## 2025-08-08 ENCOUNTER — TELEPHONE (OUTPATIENT)
Age: 25
End: 2025-08-08

## 2025-09-06 ENCOUNTER — HOSPITAL ENCOUNTER (OUTPATIENT)
Facility: HOSPITAL | Age: 25
End: 2025-09-06
Payer: COMMERCIAL

## 2025-09-06 DIAGNOSIS — E04.2 NONTOXIC MULTINODULAR GOITER: ICD-10-CM

## 2025-09-06 PROCEDURE — 76536 US EXAM OF HEAD AND NECK: CPT
